# Patient Record
Sex: MALE | Race: WHITE | NOT HISPANIC OR LATINO | Employment: OTHER | URBAN - METROPOLITAN AREA
[De-identification: names, ages, dates, MRNs, and addresses within clinical notes are randomized per-mention and may not be internally consistent; named-entity substitution may affect disease eponyms.]

---

## 2017-02-09 ENCOUNTER — OFFICE VISIT (OUTPATIENT)
Dept: CARDIOLOGY | Facility: CLINIC | Age: 82
End: 2017-02-09

## 2017-02-09 VITALS
HEIGHT: 73 IN | DIASTOLIC BLOOD PRESSURE: 60 MMHG | SYSTOLIC BLOOD PRESSURE: 102 MMHG | BODY MASS INDEX: 21.74 KG/M2 | WEIGHT: 164 LBS | HEART RATE: 67 BPM

## 2017-02-09 DIAGNOSIS — I48.20 CHRONIC ATRIAL FIBRILLATION (HCC): Primary | ICD-10-CM

## 2017-02-09 DIAGNOSIS — I47.20 VENTRICULAR TACHYCARDIA (HCC): ICD-10-CM

## 2017-02-09 PROCEDURE — 99214 OFFICE O/P EST MOD 30 MIN: CPT | Performed by: INTERNAL MEDICINE

## 2017-02-09 PROCEDURE — 93000 ELECTROCARDIOGRAM COMPLETE: CPT | Performed by: INTERNAL MEDICINE

## 2017-02-09 RX ORDER — SEVELAMER CARBONATE 800 MG/1
800 TABLET, FILM COATED ORAL
COMMUNITY
End: 2018-12-24 | Stop reason: HOSPADM

## 2017-02-09 NOTE — PROGRESS NOTES
Date of Office Visit: 17  Encounter Provider: Niranjan Ponce MD  Place of Service: [facility] CARDIOLOGY  Patient Name: Shaun Brewster  :1930      Chief Complaint   Patient presents with   • Atrial Fibrillation     History of Present Illness  HPI Comments:  He is a very pleasant 86-year-old gentleman with history of PVCs, nonsustained ventricular  arrhythmias, and normal cardiac catheterization.  He then had paroxysmal atrial  fibrillation but could not tolerate amiodarone.  Ultimately he went into atrial  fibrillation chronically but is asymptomatic.  He comes in for followup.  He does get some occasional dizziness and near syncope or syncope denies palpitations.  He is obviously worse and more fatigued on the days he has dialysis. Denies orthopnea and PND denies any lower extremity edema.  He denies chest pain and pressure.  He denies any paralysis or paresthesias no bleeding.  His wife has been ill    Atrial Fibrillation   Symptoms are negative for dizziness and weakness. Past medical history includes atrial fibrillation.         Past Medical History   Diagnosis Date   • Anemia    • Aneurysm of aortic root    • Arthritis    • Atypical chest pain    • Blind right eye    • Chronic kidney disease (CKD), stage V    • Dizziness    • Dyslipidemia    • Esophageal reflux    • Esophagitis, reflux    • Femur fracture, right    • Hypertension    • Macular degeneration    • Malignant neoplasm of prostate      gland   • Nephrolithiasis    • Nonspecific abnormal finding    • Paroxysmal atrial fibrillation    • Postnasal drip    • Premature ventricular contractions    • Renal disease    • Throat pain    • Urge incontinence of urine    • Ventricular tachycardia          Past Surgical History   Procedure Laterality Date   • Other surgical history       cardiac cath procedure summary normal, corneal lasik   • Hip surgery     • Inguinal hernia repair     • Replacement total knee        left    • Knee surgery     • Shoulder surgery     • Av fistula placement Left 2015   • Tonsillectomy and adenoidectomy     • Hand surgery Bilateral          Current Outpatient Prescriptions on File Prior to Visit   Medication Sig Dispense Refill   • diltiazem (CARDIZEM) 120 MG tablet Take one qhs (Patient taking differently: 240 mg. Take one qhs) 90 tablet 1   • diltiazem CD (CARDIZEM CD) 240 MG 24 hr capsule Take 1 capsule by mouth daily. 90 capsule 3   • diltiazem XR (DILACOR XR) 120 MG 24 hr capsule Take 1 capsule by mouth every night. 90 capsule 3   • famotidine (PEPCID) 40 MG tablet Take 1 tablet by mouth daily. 90 tablet 3   • finasteride (PROSCAR) 5 MG tablet Take 1 tablet by mouth daily. 90 tablet 3   • Multiple Vitamins-Minerals (PRESERVISION AREDS) capsule Take 1 capsule by mouth 2 (two) times a day.     • sodium bicarbonate 650 MG tablet Take 1 tablet by mouth 2 (two) times a day.     • warfarin (COUMADIN) 2.5 MG tablet Take 2.5 mg by mouth every other day.     • warfarin (COUMADIN) 5 MG tablet Take one tablet daily or as directed 90 tablet 3     No current facility-administered medications on file prior to visit.          Social History     Social History   • Marital status:      Spouse name: N/A   • Number of children: N/A   • Years of education: N/A     Occupational History   • Not on file.     Social History Main Topics   • Smoking status: Former Smoker   • Smokeless tobacco: Not on file   • Alcohol use No   • Drug use: Not on file   • Sexual activity: Not on file     Other Topics Concern   • Not on file     Social History Narrative       Family History   Problem Relation Age of Onset   • Other Brother      acute bacterial endocarditis         Review of Systems   Constitution: Negative for decreased appetite, diaphoresis, fever, weakness, malaise/fatigue, weight gain and weight loss.   HENT: Positive for hearing loss. Negative for congestion, headaches, nosebleeds and tinnitus.    Eyes:  "Positive for vision loss in left eye and vision loss in right eye. Negative for blurred vision, double vision and visual disturbance.   Cardiovascular:        As noted in HPI   Respiratory:        As noted HPI   Endocrine: Negative for cold intolerance and heat intolerance.   Hematologic/Lymphatic: Negative for bleeding problem. Does not bruise/bleed easily.   Skin: Positive for itching. Negative for color change, flushing and rash.   Musculoskeletal: Negative for arthritis, back pain, joint pain, joint swelling, muscle weakness and myalgias.   Gastrointestinal: Negative for bloating, abdominal pain, constipation, diarrhea, dysphagia, heartburn, hematemesis, hematochezia, melena, nausea and vomiting.   Genitourinary: Negative for bladder incontinence, dysuria, frequency, nocturia and urgency.   Neurological: Negative for dizziness, focal weakness, light-headedness, loss of balance, numbness, paresthesias and vertigo.   Psychiatric/Behavioral: Negative for depression, memory loss and substance abuse.       Procedures      ECG 12 Lead  Date/Time: 2/9/2017 12:26 PM  Performed by: JAVID HARMAN  Authorized by: JAVID HARMAN   Comparison: compared with previous ECG   Similar to previous ECG  Comparison to previous ECG: In SR   Rhythm: sinus rhythm  Ectopy: unifocal PVCs  Rate: normal  Conduction: 1st degree  QRS axis: normal                 Objective:      Visit Vitals   • /60 (BP Location: Right arm)   • Pulse 67   • Ht 73\" (185.4 cm)   • Wt 164 lb (74.4 kg)   • BMI 21.64 kg/m2          Physical Exam  Physical Exam   Constitutional: He is oriented to person, place, and time. He appears well-developed and well-nourished. No distress.   HENT:   Head: Normocephalic.   Eyes: Conjunctivae are normal. Pupils are equal, round, and reactive to light. No scleral icterus.   Neck: Normal carotid pulses, no hepatojugular reflux and no JVD present. Carotid bruit is not present. No tracheal deviation, no edema and no " erythema present. No thyromegaly present.   Cardiovascular: Normal rate, regular rhythm, S1 normal, S2 normal, normal heart sounds and intact distal pulses.   Extrasystoles are present. PMI is not displaced.  Exam reveals no gallop, no distant heart sounds and no friction rub.    No murmur heard.  Pulses:       Carotid pulses are 2+ on the right side, and 2+ on the left side.       Radial pulses are 2+ on the right side, and 2+ on the left side.        Femoral pulses are 2+ on the right side, and 2+ on the left side.       Dorsalis pedis pulses are 2+ on the right side, and 2+ on the left side.        Posterior tibial pulses are 2+ on the right side, and 2+ on the left side.   Fistula left forearm   Pulmonary/Chest: Effort normal and breath sounds normal. No respiratory distress. He has no decreased breath sounds. He has no wheezes. He has no rhonchi. He has no rales. He exhibits no tenderness.   Abdominal: Soft. Bowel sounds are normal. He exhibits no distension and no mass. There is no hepatosplenomegaly. There is no tenderness. There is no rebound and no guarding.   Musculoskeletal: He exhibits no edema, tenderness or deformity.   Neurological: He is alert and oriented to person, place, and time.   Skin: Skin is warm and dry. No rash noted. He is not diaphoretic. No cyanosis or erythema. No pallor. Nails show no clubbing.   Psychiatric: He has a normal mood and affect. His speech is normal and behavior is normal. Judgment and thought content normal.           Assessment:   1. This is an 86-year-old with a history of PVCs, nonsustained ventricular tachycardia, normal LV function, and normal cardiac catheterization. Continue the same. He will see us  in follow up in six months.  2. Chronic Atrial fibrillation, in atrial fibrillation. He could not tolerate amiodarone.   Atrial Fibrillation and Atrial Flutter  Assessment  • The patient has paroxysmal atrial fibrillation  • This is non-valvular in etiology  • The  patient's CHADS2-VASc score is 3  • A [id number] score of 2 or more is considered a high risk for a thromboembolic event  • Warfarin prescribed    Plan  • Continue in atrial fibrillation with rate control  • Continue warfarin for antithrombotic therapy, bleeding issues discussed  • Continue diltiazem for rate control  Interestingly today he's in sinus rhythm he's to continue the same will see us in follow-up in 6 months.    3. Dilated aortic root. Would be conservative and not really a candidate for surgical repair.  4. Renal Failure. Now on dialysis.          Plan:

## 2017-03-28 ENCOUNTER — TELEPHONE (OUTPATIENT)
Dept: CARDIOLOGY | Facility: CLINIC | Age: 82
End: 2017-03-28

## 2017-03-28 NOTE — TELEPHONE ENCOUNTER
Patient's insurance won't cover Diltiazem HCL ER 24 HR.  Will you consider changing to a formulary medication?  I have placed the list in your inbox to choose from.  Thank you/ ELLIOTT    Patient's phone number is [phone]

## 2017-03-29 RX ORDER — DILTIAZEM HYDROCHLORIDE 240 MG/1
240 CAPSULE, COATED, EXTENDED RELEASE ORAL DAILY
Qty: 90 CAPSULE | Refills: 3 | Status: SHIPPED | OUTPATIENT
Start: 2017-03-29 | End: 2018-04-26 | Stop reason: ALTCHOICE

## 2017-04-08 ENCOUNTER — DOCUMENTATION (OUTPATIENT)
Dept: PHYSICAL THERAPY | Facility: CLINIC | Age: 82
End: 2017-04-08

## 2017-04-08 RX ORDER — FAMOTIDINE 40 MG/1
40 TABLET, FILM COATED ORAL DAILY
Qty: 90 TABLET | Refills: 3 | Status: SHIPPED | OUTPATIENT
Start: 2017-04-08 | End: 2018-03-18 | Stop reason: SDUPTHER

## 2017-04-28 ENCOUNTER — DOCUMENTATION (OUTPATIENT)
Dept: PHYSICAL THERAPY | Facility: CLINIC | Age: 82
End: 2017-04-28

## 2017-04-28 RX ORDER — FINASTERIDE 5 MG/1
5 TABLET, FILM COATED ORAL DAILY
Qty: 90 TABLET | Refills: 3 | Status: SHIPPED | OUTPATIENT
Start: 2017-04-28 | End: 2018-03-18 | Stop reason: SDUPTHER

## 2017-09-07 ENCOUNTER — OFFICE VISIT (OUTPATIENT)
Dept: CARDIOLOGY | Facility: CLINIC | Age: 82
End: 2017-09-07

## 2017-09-07 DIAGNOSIS — N18.5 RENAL FAILURE, CHRONIC, STAGE 5 (HCC): ICD-10-CM

## 2017-09-07 DIAGNOSIS — I77.810 AORTIC ROOT DILATATION (HCC): ICD-10-CM

## 2017-09-07 DIAGNOSIS — I47.20 VENTRICULAR TACHYCARDIA (HCC): ICD-10-CM

## 2017-09-07 DIAGNOSIS — I48.20 CHRONIC ATRIAL FIBRILLATION (HCC): Primary | ICD-10-CM

## 2017-09-07 PROCEDURE — 93000 ELECTROCARDIOGRAM COMPLETE: CPT | Performed by: INTERNAL MEDICINE

## 2017-09-07 PROCEDURE — 99214 OFFICE O/P EST MOD 30 MIN: CPT | Performed by: INTERNAL MEDICINE

## 2017-09-07 RX ORDER — FINASTERIDE 5 MG/1
TABLET, FILM COATED ORAL
COMMUNITY
Start: 2017-08-05 | End: 2018-12-19 | Stop reason: HOSPADM

## 2017-09-07 RX ORDER — B COMPLEX, C NO.20/FOLIC ACID 1 MG
1 CAPSULE ORAL
COMMUNITY
End: 2019-04-25

## 2017-09-07 NOTE — PROGRESS NOTES
Date of Office Visit: 17  Encounter Provider: Niranjan Ponce MD  Place of Service: [facility] CARDIOLOGY  Patient Name: Shaun Brewster  :1930      Chief Complaint   Patient presents with   • Atrial Fibrillation     History of Present Illness  HPI Comments:  He is a very pleasant 87-year-old gentleman with history of PVCs, nonsustained ventricular  arrhythmias, and normal cardiac catheterization.  He then had paroxysmal atrial  fibrillation but could not tolerate amiodarone.  Ultimately he went into atrial  fibrillation chronically but is asymptomatic.  He comes in for followup. The patient denies chest pain, pressure and heaviness. No shortness of breath, othopnea or PND. No palpitations, near syncope or syncope. No stroke type symptoms like paralysis, paresthesia, abrupt vision loss and dysarthria. No bleeding like blood in the stool or dark stools.   He does have some fatigue especially after dialysis.  He also has occasional constipation.  He does occasionally walk with a walker or cane.  Overall he feels like he's doing well and things at home are good.  His INR is monitored by his PCP    Atrial Fibrillation   Symptoms are negative for dizziness and weakness. Past medical history includes atrial fibrillation.         Past Medical History:   Diagnosis Date   • Anemia    • Aneurysm of aortic root    • Arthritis    • Atypical chest pain    • Blind right eye    • Chronic kidney disease (CKD), stage V    • Dizziness    • Dyslipidemia    • Esophageal reflux    • Esophagitis, reflux    • Femur fracture, right    • Hypertension    • Macular degeneration    • Malignant neoplasm of prostate     gland   • Nephrolithiasis    • Nonspecific abnormal finding    • Paroxysmal atrial fibrillation    • Postnasal drip    • Premature ventricular contractions    • Renal disease    • Throat pain    • Urge incontinence of urine    • Ventricular tachycardia          Past Surgical History:    Procedure Laterality Date   • ARTERIOVENOUS FISTULA Left 2015   • HAND SURGERY Bilateral    • HIP SURGERY     • INGUINAL HERNIA REPAIR     • KNEE SURGERY     • OTHER SURGICAL HISTORY      cardiac cath procedure summary normal, corneal lasik   • REPLACEMENT TOTAL KNEE      left    • SHOULDER SURGERY     • TONSILLECTOMY AND ADENOIDECTOMY           Current Outpatient Prescriptions on File Prior to Visit   Medication Sig Dispense Refill   • diltiaZEM CD (CARDIZEM CD) 240 MG 24 hr capsule Take 1 capsule by mouth Daily. 90 capsule 3   • famotidine (PEPCID) 40 MG tablet Take 1 tablet by mouth Daily. 1 qd 90 tablet 3   • Multiple Vitamins-Minerals (PRESERVISION AREDS) capsule Take 1 capsule by mouth 2 (two) times a day.     • sevelamer (RENVELA) 800 MG tablet Take 800 mg by mouth 3 (Three) Times a Day With Meals.     • sodium bicarbonate 650 MG tablet Take 1 tablet by mouth 2 (two) times a day.     • warfarin (COUMADIN) 2.5 MG tablet Take 2.5 mg by mouth every other day.     • warfarin (COUMADIN) 5 MG tablet Take one tablet daily or as directed 90 tablet 3     No current facility-administered medications on file prior to visit.          Social History     Social History   • Marital status:      Spouse name: N/A   • Number of children: N/A   • Years of education: N/A     Occupational History   • Not on file.     Social History Main Topics   • Smoking status: Former Smoker   • Smokeless tobacco: Not on file   • Alcohol use No   • Drug use: Not on file   • Sexual activity: Not on file     Other Topics Concern   • Not on file     Social History Narrative       Family History   Problem Relation Age of Onset   • Other Brother      acute bacterial endocarditis         Review of Systems   Constitution: Negative for decreased appetite, diaphoresis, fever, weakness, malaise/fatigue, weight gain and weight loss.   HENT: Negative for congestion, headaches, hearing loss, nosebleeds and tinnitus.    Eyes: Negative for blurred  vision, double vision, vision loss in left eye, vision loss in right eye and visual disturbance.   Cardiovascular:        As noted in HPI   Respiratory:        As noted HPI   Endocrine: Negative for cold intolerance and heat intolerance.   Hematologic/Lymphatic: Negative for bleeding problem. Does not bruise/bleed easily.   Skin: Negative for color change, flushing, itching and rash.   Musculoskeletal: Negative for arthritis, back pain, joint pain, joint swelling, muscle weakness and myalgias.   Gastrointestinal: Negative for bloating, abdominal pain, constipation, diarrhea, dysphagia, heartburn, hematemesis, hematochezia, melena, nausea and vomiting.   Genitourinary: Negative for bladder incontinence, dysuria, frequency, nocturia and urgency.   Neurological: Negative for dizziness, focal weakness, light-headedness, loss of balance, numbness, paresthesias and vertigo.   Psychiatric/Behavioral: Negative for depression, memory loss and substance abuse.       Procedures      ECG 12 Lead  Date/Time: 9/7/2017 2:28 PM  Performed by: JAVID HARMAN  Authorized by: JAVID HARMAN   Comparison: compared with previous ECG   Similar to previous ECG  Rhythm: atrial fibrillation  Rate: normal  QRS axis: normal  Clinical impression comment: Diffuse ST _ T abnormality                 Objective:    There were no vitals taken for this visit.       Physical Exam  Physical Exam   Constitutional: He is oriented to person, place, and time. He appears well-developed and well-nourished. No distress.   HENT:   Head: Normocephalic.   Eyes: Conjunctivae are normal. Pupils are equal, round, and reactive to light. No scleral icterus.   Neck: Normal carotid pulses, no hepatojugular reflux and no JVD present. Carotid bruit is not present. No tracheal deviation, no edema and no erythema present. No thyromegaly present.   Cardiovascular: Normal rate, S1 normal, S2 normal, normal heart sounds and intact distal pulses.  An irregularly  irregular rhythm present.  No extrasystoles are present. PMI is not displaced.  Exam reveals no gallop, no distant heart sounds and no friction rub.    No murmur heard.  Pulses:       Carotid pulses are 2+ on the right side, and 2+ on the left side.       Radial pulses are 2+ on the right side, and 2+ on the left side.        Femoral pulses are 2+ on the right side, and 2+ on the left side.       Dorsalis pedis pulses are 2+ on the right side, and 2+ on the left side.        Posterior tibial pulses are 2+ on the right side, and 2+ on the left side.   Fistula left upper arm.   Pulmonary/Chest: Effort normal and breath sounds normal. No respiratory distress. He has no decreased breath sounds. He has no wheezes. He has no rhonchi. He has no rales. He exhibits no tenderness.   Abdominal: Soft. Bowel sounds are normal. He exhibits no distension and no mass. There is no hepatosplenomegaly. There is no tenderness. There is no rebound and no guarding.   Musculoskeletal: He exhibits no edema, tenderness or deformity.   Neurological: He is alert and oriented to person, place, and time.   Skin: Skin is warm and dry. No rash noted. He is not diaphoretic. No cyanosis or erythema. No pallor. Nails show no clubbing.   Psychiatric: He has a normal mood and affect. His speech is normal and behavior is normal. Judgment and thought content normal.           Assessment:   1. This is an 87-year-old with a history of PVCs, nonsustained ventricular tachycardia, normal LV function, and normal cardiac catheterization. Continue the same. He will see us  in follow up in six months.  2. Chronic Atrial fibrillation, in atrial fibrillation. He could not tolerate amiodarone.   Atrial Fibrillation and Atrial Flutter  Assessment  • The patient has paroxysmal atrial fibrillation  • This is non-valvular in etiology  • The patient's CHADS2-VASc score is 3  • A [id number] score of 2 or more is considered a high risk for a thromboembolic event  •  Warfarin prescribed    Plan  • Continue in atrial fibrillation with rate control  • Continue warfarin for antithrombotic therapy, bleeding issues discussed  • Continue diltiazem for rate control  And age fibrillation today continue the same see us in follow-up in 6 months     3. Dilated aortic root. Would be conservative and not really a candidate for surgical repair.  4. Renal Failure. Now on dialysis.           Plan:

## 2018-01-02 RX ORDER — DILTIAZEM HYDROCHLORIDE 120 MG/1
120 CAPSULE, EXTENDED RELEASE ORAL NIGHTLY
Qty: 90 CAPSULE | Refills: 3 | Status: SHIPPED | OUTPATIENT
Start: 2018-01-02 | End: 2018-12-02 | Stop reason: SDUPTHER

## 2018-01-02 RX ORDER — WARFARIN SODIUM 5 MG/1
TABLET ORAL
Qty: 90 TABLET | Refills: 3 | Status: SHIPPED | OUTPATIENT
Start: 2018-01-02 | End: 2018-12-02 | Stop reason: SDUPTHER

## 2018-03-19 RX ORDER — FAMOTIDINE 40 MG/1
TABLET, FILM COATED ORAL
Qty: 90 TABLET | Refills: 3 | Status: SHIPPED | OUTPATIENT
Start: 2018-03-19 | End: 2019-03-08 | Stop reason: SDUPTHER

## 2018-03-19 RX ORDER — FINASTERIDE 5 MG/1
TABLET, FILM COATED ORAL
Qty: 90 TABLET | Refills: 3 | Status: SHIPPED | OUTPATIENT
Start: 2018-03-19 | End: 2018-04-26 | Stop reason: SDUPTHER

## 2018-04-26 ENCOUNTER — OFFICE VISIT (OUTPATIENT)
Dept: CARDIOLOGY | Facility: CLINIC | Age: 83
End: 2018-04-26

## 2018-04-26 VITALS
HEIGHT: 73 IN | SYSTOLIC BLOOD PRESSURE: 104 MMHG | WEIGHT: 172 LBS | HEART RATE: 73 BPM | DIASTOLIC BLOOD PRESSURE: 64 MMHG | BODY MASS INDEX: 22.8 KG/M2

## 2018-04-26 DIAGNOSIS — I47.20 VENTRICULAR TACHYCARDIA (HCC): ICD-10-CM

## 2018-04-26 DIAGNOSIS — I77.810 AORTIC ROOT DILATATION (HCC): ICD-10-CM

## 2018-04-26 DIAGNOSIS — N18.5 RENAL FAILURE, CHRONIC, STAGE 5 (HCC): ICD-10-CM

## 2018-04-26 DIAGNOSIS — I48.20 CHRONIC ATRIAL FIBRILLATION (HCC): Primary | ICD-10-CM

## 2018-04-26 PROCEDURE — 99214 OFFICE O/P EST MOD 30 MIN: CPT | Performed by: INTERNAL MEDICINE

## 2018-04-26 NOTE — PROGRESS NOTES
Date of Office Visit: 18  Encounter Provider: Niranjan Ponce MD  Place of Service: [facility] CARDIOLOGY  Patient Name: Shaun Brewster  :1930  Referral Provider:Andrew Trujillo*      No chief complaint on file.    History of Present Illness   He is a very pleasant 87-year-old gentleman with history of PVCs, nonsustained ventricular arrhythmias, and normal cardiac catheterization.  He then had paroxysmal atrial fibrillation but could not tolerate amiodarone.  Ultimately he went into atrial fibrillation chronically but is asymptomatic.  He comes in for followup.  The patient denies chest pain, pressure and heaviness. No shortness of breath, othopnea or PND. No palpitations, near syncope or syncope. No stroke type symptoms like paralysis, paresthesia, abrupt vision loss and dysarthria. No bleeding like blood in the stool or dark stools.   He's been getting along great.  Walks with a cane they moved to a smaller unit which is helped him some.  He's had no falling spells.      Atrial Fibrillation   Symptoms are negative for dizziness and weakness. Past medical history includes atrial fibrillation.         Past Medical History:   Diagnosis Date   • Anemia    • Aneurysm of aortic root    • Arthritis    • Atypical chest pain    • Blind right eye    • Chronic kidney disease (CKD), stage V    • Dizziness    • Dyslipidemia    • Esophageal reflux    • Esophagitis, reflux    • Femur fracture, right    • Hypertension    • Macular degeneration    • Malignant neoplasm of prostate     gland   • Nephrolithiasis    • Nonspecific abnormal finding    • Paroxysmal atrial fibrillation    • Postnasal drip    • Premature ventricular contractions    • Renal disease    • Throat pain    • Urge incontinence of urine    • Ventricular tachycardia          Past Surgical History:   Procedure Laterality Date   • ARTERIOVENOUS FISTULA Left 2015   • HAND SURGERY Bilateral    • HIP SURGERY     •  INGUINAL HERNIA REPAIR     • KNEE SURGERY     • OTHER SURGICAL HISTORY      cardiac cath procedure summary normal, corneal lasik   • REPLACEMENT TOTAL KNEE      left    • SHOULDER SURGERY     • TONSILLECTOMY AND ADENOIDECTOMY           Current Outpatient Prescriptions on File Prior to Visit   Medication Sig Dispense Refill   • diltiaZEM CD (CARDIZEM CD) 240 MG 24 hr capsule Take 1 capsule by mouth Daily. 90 capsule 3   • diltiazem XR (DILACOR XR) 120 MG 24 hr capsule Take 1 capsule by mouth Every Night. 90 capsule 3   • famotidine (PEPCID) 40 MG tablet TAKE 1 TABLET DAILY 90 tablet 3   • finasteride (PROSCAR) 5 MG tablet      • finasteride (PROSCAR) 5 MG tablet TAKE 1 TABLET DAILY 90 tablet 3   • Multiple Vitamins-Minerals (PRESERVISION AREDS) capsule Take 1 capsule by mouth 2 (two) times a day.     • sevelamer (RENVELA) 800 MG tablet Take 800 mg by mouth 3 (Three) Times a Day With Meals.     • sodium bicarbonate 650 MG tablet Take 1 tablet by mouth 2 (two) times a day.     • triphrocaps (NEPHROCAPS) 1 MG capsule capsule Take 1 capsule by mouth.     • warfarin (COUMADIN) 2.5 MG tablet Take 2.5 mg by mouth every other day.     • warfarin (COUMADIN) 5 MG tablet Take one tablet daily or as directed 90 tablet 3     No current facility-administered medications on file prior to visit.          Social History     Social History   • Marital status:      Spouse name: N/A   • Number of children: N/A   • Years of education: N/A     Occupational History   • Not on file.     Social History Main Topics   • Smoking status: Former Smoker   • Smokeless tobacco: Not on file   • Alcohol use No   • Drug use: Unknown   • Sexual activity: Not on file     Other Topics Concern   • Not on file     Social History Narrative   • No narrative on file       Family History   Problem Relation Age of Onset   • Other Brother      acute bacterial endocarditis         Review of Systems   Constitution: Negative for decreased appetite,  diaphoresis, fever, weakness, malaise/fatigue, weight gain and weight loss.   HENT: Negative for congestion, hearing loss, nosebleeds and tinnitus.    Eyes: Negative for blurred vision, double vision, vision loss in left eye, vision loss in right eye and visual disturbance.   Cardiovascular:        As noted in HPI   Respiratory:        As noted HPI   Endocrine: Negative for cold intolerance and heat intolerance.   Hematologic/Lymphatic: Negative for bleeding problem. Does not bruise/bleed easily.   Skin: Negative for color change, flushing, itching and rash.   Musculoskeletal: Negative for arthritis, back pain, joint pain, joint swelling, muscle weakness and myalgias.   Gastrointestinal: Negative for bloating, abdominal pain, constipation, diarrhea, dysphagia, heartburn, hematemesis, hematochezia, melena, nausea and vomiting.   Genitourinary: Negative for bladder incontinence, dysuria, frequency, nocturia and urgency.   Neurological: Negative for dizziness, focal weakness, headaches, light-headedness, loss of balance, numbness, paresthesias and vertigo.   Psychiatric/Behavioral: Negative for depression, memory loss and substance abuse.       Procedures    Procedures        Objective:    There were no vitals taken for this visit.       Physical Exam  Physical Exam   Constitutional: He is oriented to person, place, and time. He appears well-developed and well-nourished. No distress.   HENT:   Head: Normocephalic.   Eyes: Conjunctivae are normal. Pupils are equal, round, and reactive to light. No scleral icterus.   Neck: Normal carotid pulses, no hepatojugular reflux and no JVD present. Carotid bruit is not present. No tracheal deviation, no edema and no erythema present. No thyromegaly present.   Cardiovascular: Normal rate, S1 normal, S2 normal, normal heart sounds and intact distal pulses.  An irregularly irregular rhythm present.  No extrasystoles are present. PMI is not displaced.  Exam reveals no gallop, no  distant heart sounds and no friction rub.    No murmur heard.  Pulses:       Carotid pulses are 2+ on the right side, and 2+ on the left side.       Radial pulses are 2+ on the right side, and 2+ on the left side.        Femoral pulses are 2+ on the right side, and 2+ on the left side.       Dorsalis pedis pulses are 2+ on the right side, and 2+ on the left side.        Posterior tibial pulses are 2+ on the right side, and 2+ on the left side.   Fistula left upper arm.   Pulmonary/Chest: Effort normal and breath sounds normal. No respiratory distress. He has no decreased breath sounds. He has no wheezes. He has no rhonchi. He has no rales. He exhibits no tenderness.   Abdominal: Soft. Bowel sounds are normal. He exhibits no distension and no mass. There is no hepatosplenomegaly. There is no tenderness. There is no rebound and no guarding.   Musculoskeletal: He exhibits no edema, tenderness or deformity.   Neurological: He is alert and oriented to person, place, and time.   Skin: Skin is warm and dry. No rash noted. He is not diaphoretic. No cyanosis or erythema. No pallor. Nails show no clubbing.   Psychiatric: He has a normal mood and affect. His speech is normal and behavior is normal. Judgment and thought content normal.           Assessment:   1. This is an 87-year-old with a history of PVCs, nonsustained ventricular tachycardia, normal LV function, and normal cardiac catheterization. Continue the same. He will see us  in follow up in six months.  2. Chronic Atrial fibrillation, in atrial fibrillation. He could not tolerate amiodarone.   Atrial Fibrillation and Atrial Flutter  Assessment  • The patient has paroxysmal atrial fibrillation  • This is non-valvular in etiology  • The patient's CHADS2-VASc score is 3  • A [id number]-[id number] score of 2 or more is considered a high risk for a thromboembolic event  • Warfarin prescribed    Plan  • Continue in atrial fibrillation with rate control  • Continue warfarin for  antithrombotic therapy, bleeding issues discussed  • Continue diltiazem for rate control  He is very stable on some low-dose diltiazem and anticoagulation.  He'll see us in follow-up in 6 months.     3. Dilated aortic root. Would be conservative and not really a candidate for surgical repair.  4. Renal Failure. Now on dialysis.            Plan:

## 2018-07-16 DIAGNOSIS — L98.9 SKIN LESION OF FACE: Primary | ICD-10-CM

## 2018-07-23 ENCOUNTER — TELEPHONE (OUTPATIENT)
Dept: SPORTS MEDICINE | Facility: CLINIC | Age: 83
End: 2018-07-23

## 2018-07-23 NOTE — TELEPHONE ENCOUNTER
Samia from [facility] called to inform that patient's INR was 1.5 on 7/22/2018. Results were faxed to our office.

## 2018-08-06 ENCOUNTER — TELEPHONE (OUTPATIENT)
Dept: SPORTS MEDICINE | Facility: CLINIC | Age: 83
End: 2018-08-06

## 2018-09-18 ENCOUNTER — TELEPHONE (OUTPATIENT)
Dept: SPORTS MEDICINE | Facility: CLINIC | Age: 83
End: 2018-09-18

## 2018-10-02 ENCOUNTER — TELEPHONE (OUTPATIENT)
Dept: SPORTS MEDICINE | Facility: CLINIC | Age: 83
End: 2018-10-02

## 2018-10-02 NOTE — TELEPHONE ENCOUNTER
Mahnaz hunt [facility] called stating patient checked INR on 9/30/2018    INR 1.6      Results have been faxed to our office.

## 2018-11-20 ENCOUNTER — OFFICE VISIT (OUTPATIENT)
Dept: SPORTS MEDICINE | Facility: CLINIC | Age: 83
End: 2018-11-20

## 2018-11-20 VITALS
WEIGHT: 168 LBS | OXYGEN SATURATION: 98 % | HEART RATE: 70 BPM | SYSTOLIC BLOOD PRESSURE: 102 MMHG | TEMPERATURE: 98 F | BODY MASS INDEX: 22.26 KG/M2 | HEIGHT: 73 IN | DIASTOLIC BLOOD PRESSURE: 64 MMHG

## 2018-11-20 DIAGNOSIS — H61.23 BILATERAL IMPACTED CERUMEN: ICD-10-CM

## 2018-11-20 DIAGNOSIS — S60.459A ACUTE FOREIGN BODY OF INDEX FINGER, INITIAL ENCOUNTER: Primary | ICD-10-CM

## 2018-11-20 PROCEDURE — 69209 REMOVE IMPACTED EAR WAX UNI: CPT | Performed by: FAMILY MEDICINE

## 2018-11-20 PROCEDURE — 99214 OFFICE O/P EST MOD 30 MIN: CPT | Performed by: FAMILY MEDICINE

## 2018-11-20 PROCEDURE — 73140 X-RAY EXAM OF FINGER(S): CPT | Performed by: FAMILY MEDICINE

## 2018-11-20 NOTE — PROGRESS NOTES
"Shaun is a 88 y.o. year old male    Chief complaint: \"I think my ears are stopped up\" also left index finger discomfort possible foreign body    History of Present Illness   HPI   1.  Patient with a past history of cerumen impaction and requires flushing 1-2 times per year.  He has noticed some decreased hearing on the left side.  No discharge.  No pain.  2.  About 2 months ago patient was changing a roll of toilet paper for a metal spindle and feels like he may have gotten a sliver of metal stuck into the tip of his left index finger.  He has noted some sharp discomfort and \"electrical shock\" sensation on occasion but no swelling or erythema, there was no bleeding and there is no discharge.    I have reviewed the patient's medical, family, and social history in detail and updated the computerized patient record.    Review of Systems   Constitutional: Negative.    HENT: Positive for hearing loss. Negative for ear discharge and ear pain.    Respiratory: Negative.    Cardiovascular: Negative.    Musculoskeletal:        Per history of present illness       /64   Pulse 70   Temp 98 °F (36.7 °C)   Ht 185.4 cm (73\")   Wt 76.2 kg (168 lb)   SpO2 98%   BMI 22.16 kg/m²      Physical Exam   Constitutional: He is oriented to person, place, and time. He appears well-developed and well-nourished.   HENT:   Head: Normocephalic and atraumatic.   Bilateral cerumen impaction left greater than right.  I personally flushed ears with warm water with return of cerumen.  Patient tolerated well no adverse effects.   Eyes: Conjunctivae and EOM are normal. Pupils are equal, round, and reactive to light.   Cardiovascular:   No peripheral edema   Pulmonary/Chest: Effort normal.   Musculoskeletal:   Left index finger is normal in general appearance.  Motor 5 out of 5.  Neurovascularly intact.   Neurological: He is alert and oriented to person, place, and time.   Skin: Skin is warm and dry.   Psychiatric: He has a normal mood and " affect. His behavior is normal.   Vitals reviewed.   x-ray left index finger:  No foreign body is evident.      Diagnoses and all orders for this visit:    Acute foreign body of index finger, initial encounter  -     XR Finger 2+ View Left    Bilateral impacted cerumen         The left index finger sounds like it could be a small neuroma in the distal tip.  We'll observe for now.    EMR Dragon/Transcription disclaimer:    Much of this encounter note is an electronic transcription/translation of spoken language to printed text.  The electronic translation of spoken language may permit erroneous, or at times, nonsensical words or phrases to be inadvertently transcribed.  Although I have reviewed the note for such errors some may still exist.

## 2018-12-03 RX ORDER — WARFARIN SODIUM 5 MG/1
TABLET ORAL
Qty: 90 TABLET | Refills: 3 | Status: SHIPPED | OUTPATIENT
Start: 2018-12-03 | End: 2018-12-24 | Stop reason: HOSPADM

## 2018-12-03 RX ORDER — DILTIAZEM HYDROCHLORIDE 120 MG/1
CAPSULE, EXTENDED RELEASE ORAL
Qty: 90 CAPSULE | Refills: 3 | Status: SHIPPED | OUTPATIENT
Start: 2018-12-03 | End: 2018-12-24 | Stop reason: HOSPADM

## 2018-12-06 ENCOUNTER — APPOINTMENT (OUTPATIENT)
Dept: GENERAL RADIOLOGY | Facility: HOSPITAL | Age: 83
End: 2018-12-06

## 2018-12-06 ENCOUNTER — HOSPITAL ENCOUNTER (EMERGENCY)
Facility: HOSPITAL | Age: 83
Discharge: HOME | End: 2018-12-06
Attending: EMERGENCY MEDICINE | Admitting: EMERGENCY MEDICINE

## 2018-12-06 VITALS
SYSTOLIC BLOOD PRESSURE: 137 MMHG | DIASTOLIC BLOOD PRESSURE: 86 MMHG | BODY MASS INDEX: 22.2 KG/M2 | HEIGHT: 74 IN | RESPIRATION RATE: 17 BRPM | WEIGHT: 173 LBS | TEMPERATURE: 97.9 F | HEART RATE: 95 BPM | OXYGEN SATURATION: 98 %

## 2018-12-06 DIAGNOSIS — S22.41XA CLOSED FRACTURE OF MULTIPLE RIBS OF RIGHT SIDE, INITIAL ENCOUNTER: Primary | ICD-10-CM

## 2018-12-06 DIAGNOSIS — N18.6 END STAGE RENAL DISEASE (HCC): ICD-10-CM

## 2018-12-06 LAB
ALBUMIN SERPL-MCNC: 4.3 G/DL (ref 3.5–5.2)
ALBUMIN/GLOB SERPL: 1.7 G/DL
ALP SERPL-CCNC: 70 U/L (ref 39–117)
ALT SERPL W P-5'-P-CCNC: 20 U/L (ref 1–41)
ANION GAP SERPL CALCULATED.3IONS-SCNC: 16.2 MMOL/L
AST SERPL-CCNC: 20 U/L (ref 1–40)
BACTERIA UR QL AUTO: NORMAL /HPF
BASOPHILS # BLD AUTO: 0.03 10*3/MM3 (ref 0–0.2)
BASOPHILS NFR BLD AUTO: 0.3 % (ref 0–1.5)
BILIRUB SERPL-MCNC: 0.4 MG/DL (ref 0.1–1.2)
BILIRUB UR QL STRIP: NEGATIVE
BUN BLD-MCNC: 45 MG/DL (ref 8–23)
BUN/CREAT SERPL: 8.1 (ref 7–25)
CALCIUM SPEC-SCNC: 10.4 MG/DL (ref 8.6–10.5)
CHLORIDE SERPL-SCNC: 97 MMOL/L (ref 98–107)
CLARITY UR: CLEAR
CO2 SERPL-SCNC: 23.8 MMOL/L (ref 22–29)
COLOR UR: YELLOW
CREAT BLD-MCNC: 5.59 MG/DL (ref 0.76–1.27)
DEPRECATED RDW RBC AUTO: 49.5 FL (ref 37–54)
EOSINOPHIL # BLD AUTO: 0.1 10*3/MM3 (ref 0–0.7)
EOSINOPHIL NFR BLD AUTO: 0.9 % (ref 0.3–6.2)
ERYTHROCYTE [DISTWIDTH] IN BLOOD BY AUTOMATED COUNT: 14.5 % (ref 11.5–14.5)
GFR SERPL CREATININE-BSD FRML MDRD: 10 ML/MIN/1.73
GFR SERPL CREATININE-BSD FRML MDRD: ABNORMAL ML/MIN/1.73
GLOBULIN UR ELPH-MCNC: 2.6 GM/DL
GLUCOSE BLD-MCNC: 124 MG/DL (ref 65–99)
GLUCOSE UR STRIP-MCNC: ABNORMAL MG/DL
HCT VFR BLD AUTO: 30.8 % (ref 40.4–52.2)
HGB BLD-MCNC: 10.2 G/DL (ref 13.7–17.6)
HGB UR QL STRIP.AUTO: NEGATIVE
HYALINE CASTS UR QL AUTO: NORMAL /LPF
IMM GRANULOCYTES # BLD: 0.06 10*3/MM3 (ref 0–0.03)
IMM GRANULOCYTES NFR BLD: 0.5 % (ref 0–0.5)
INR PPP: 2.18 (ref 0.9–1.1)
KETONES UR QL STRIP: NEGATIVE
LEUKOCYTE ESTERASE UR QL STRIP.AUTO: NEGATIVE
LYMPHOCYTES # BLD AUTO: 0.73 10*3/MM3 (ref 0.9–4.8)
LYMPHOCYTES NFR BLD AUTO: 6.7 % (ref 19.6–45.3)
MCH RBC QN AUTO: 31.1 PG (ref 27–32.7)
MCHC RBC AUTO-ENTMCNC: 33.1 G/DL (ref 32.6–36.4)
MCV RBC AUTO: 93.9 FL (ref 79.8–96.2)
MONOCYTES # BLD AUTO: 0.79 10*3/MM3 (ref 0.2–1.2)
MONOCYTES NFR BLD AUTO: 7.2 % (ref 5–12)
NEUTROPHILS # BLD AUTO: 9.26 10*3/MM3 (ref 1.9–8.1)
NEUTROPHILS NFR BLD AUTO: 84.9 % (ref 42.7–76)
NITRITE UR QL STRIP: NEGATIVE
PH UR STRIP.AUTO: >=9 [PH] (ref 5–8)
PLATELET # BLD AUTO: 184 10*3/MM3 (ref 140–500)
PMV BLD AUTO: 10.1 FL (ref 6–12)
POTASSIUM BLD-SCNC: 4.3 MMOL/L (ref 3.5–5.2)
PROT SERPL-MCNC: 6.9 G/DL (ref 6–8.5)
PROT UR QL STRIP: ABNORMAL
PROTHROMBIN TIME: 23.9 SECONDS (ref 11.7–14.2)
RBC # BLD AUTO: 3.28 10*6/MM3 (ref 4.6–6)
RBC # UR: NORMAL /HPF
REF LAB TEST METHOD: NORMAL
SODIUM BLD-SCNC: 137 MMOL/L (ref 136–145)
SP GR UR STRIP: 1.01 (ref 1–1.03)
SQUAMOUS #/AREA URNS HPF: NORMAL /HPF
UROBILINOGEN UR QL STRIP: ABNORMAL
WBC NRBC COR # BLD: 10.91 10*3/MM3 (ref 4.5–10.7)
WBC UR QL AUTO: NORMAL /HPF

## 2018-12-06 PROCEDURE — 85610 PROTHROMBIN TIME: CPT | Performed by: EMERGENCY MEDICINE

## 2018-12-06 PROCEDURE — 85025 COMPLETE CBC W/AUTO DIFF WBC: CPT | Performed by: EMERGENCY MEDICINE

## 2018-12-06 PROCEDURE — 94799 UNLISTED PULMONARY SVC/PX: CPT

## 2018-12-06 PROCEDURE — 71101 X-RAY EXAM UNILAT RIBS/CHEST: CPT

## 2018-12-06 PROCEDURE — 25010000002 ONDANSETRON PER 1 MG: Performed by: EMERGENCY MEDICINE

## 2018-12-06 PROCEDURE — 80053 COMPREHEN METABOLIC PANEL: CPT | Performed by: EMERGENCY MEDICINE

## 2018-12-06 PROCEDURE — 25010000002 MORPHINE (PF) 10 MG/ML SOLUTION: Performed by: EMERGENCY MEDICINE

## 2018-12-06 PROCEDURE — 96374 THER/PROPH/DIAG INJ IV PUSH: CPT

## 2018-12-06 PROCEDURE — 96375 TX/PRO/DX INJ NEW DRUG ADDON: CPT

## 2018-12-06 PROCEDURE — 81001 URINALYSIS AUTO W/SCOPE: CPT | Performed by: EMERGENCY MEDICINE

## 2018-12-06 PROCEDURE — 99285 EMERGENCY DEPT VISIT HI MDM: CPT

## 2018-12-06 RX ORDER — MORPHINE SULFATE 2 MG/ML
2 INJECTION, SOLUTION INTRAMUSCULAR; INTRAVENOUS ONCE
Status: COMPLETED | OUTPATIENT
Start: 2018-12-06 | End: 2018-12-06

## 2018-12-06 RX ORDER — LIDOCAINE 50 MG/G
2 PATCH TOPICAL
Status: DISCONTINUED | OUTPATIENT
Start: 2018-12-07 | End: 2018-12-06

## 2018-12-06 RX ORDER — LIDOCAINE 50 MG/G
2 PATCH TOPICAL ONCE
Status: DISCONTINUED | OUTPATIENT
Start: 2018-12-06 | End: 2018-12-07 | Stop reason: HOSPADM

## 2018-12-06 RX ORDER — SODIUM CHLORIDE 0.9 % (FLUSH) 0.9 %
10 SYRINGE (ML) INJECTION AS NEEDED
Status: DISCONTINUED | OUTPATIENT
Start: 2018-12-06 | End: 2018-12-07 | Stop reason: HOSPADM

## 2018-12-06 RX ORDER — ONDANSETRON 2 MG/ML
4 INJECTION INTRAMUSCULAR; INTRAVENOUS ONCE
Status: COMPLETED | OUTPATIENT
Start: 2018-12-06 | End: 2018-12-06

## 2018-12-06 RX ORDER — HYDROCODONE BITARTRATE AND ACETAMINOPHEN 5; 325 MG/1; MG/1
1 TABLET ORAL EVERY 6 HOURS PRN
Qty: 15 TABLET | Refills: 0 | Status: SHIPPED | OUTPATIENT
Start: 2018-12-06 | End: 2019-01-03

## 2018-12-06 RX ORDER — LIDOCAINE 50 MG/G
2 PATCH TOPICAL EVERY 24 HOURS
Qty: 15 EACH | Refills: 0 | Status: SHIPPED | OUTPATIENT
Start: 2018-12-06 | End: 2019-04-25

## 2018-12-06 RX ADMIN — ONDANSETRON 4 MG: 2 INJECTION INTRAMUSCULAR; INTRAVENOUS at 19:50

## 2018-12-06 RX ADMIN — MORPHINE SULFATE 2 MG: 10 INJECTION INTRAVENOUS at 19:52

## 2018-12-06 RX ADMIN — LIDOCAINE 2 PATCH: 50 PATCH CUTANEOUS at 21:56

## 2018-12-08 ENCOUNTER — DOCUMENTATION (OUTPATIENT)
Dept: SPORTS MEDICINE | Facility: CLINIC | Age: 83
End: 2018-12-08

## 2018-12-09 ENCOUNTER — APPOINTMENT (OUTPATIENT)
Dept: CT IMAGING | Facility: HOSPITAL | Age: 83
End: 2018-12-09

## 2018-12-09 ENCOUNTER — HOSPITAL ENCOUNTER (INPATIENT)
Facility: HOSPITAL | Age: 83
LOS: 10 days | Discharge: HOME HEALTH | End: 2018-12-19
Attending: EMERGENCY MEDICINE | Admitting: HOSPITALIST

## 2018-12-09 DIAGNOSIS — K56.609 SMALL BOWEL OBSTRUCTION (HCC): Primary | ICD-10-CM

## 2018-12-09 DIAGNOSIS — Z99.2 ESRD (END STAGE RENAL DISEASE) ON DIALYSIS (HCC): ICD-10-CM

## 2018-12-09 DIAGNOSIS — D72.829 LEUKOCYTOSIS, UNSPECIFIED TYPE: ICD-10-CM

## 2018-12-09 DIAGNOSIS — N18.6 ESRD (END STAGE RENAL DISEASE) ON DIALYSIS (HCC): ICD-10-CM

## 2018-12-09 PROBLEM — S22.41XA CLOSED FRACTURE OF MULTIPLE RIBS OF RIGHT SIDE: Status: ACTIVE | Noted: 2018-12-09

## 2018-12-09 PROBLEM — N28.1 KIDNEY CYSTS: Status: ACTIVE | Noted: 2018-12-09

## 2018-12-09 PROBLEM — J90 PLEURAL EFFUSION ON RIGHT: Status: ACTIVE | Noted: 2018-12-09

## 2018-12-09 PROBLEM — K76.89 LIVER CYST: Status: ACTIVE | Noted: 2018-12-09

## 2018-12-09 PROBLEM — I10 HYPERTENSION: Status: ACTIVE | Noted: 2018-12-09

## 2018-12-09 LAB
ALBUMIN SERPL-MCNC: 4.4 G/DL (ref 3.5–5.2)
ALBUMIN/GLOB SERPL: 1.5 G/DL
ALP SERPL-CCNC: 77 U/L (ref 39–117)
ALT SERPL W P-5'-P-CCNC: 19 U/L (ref 1–41)
ANION GAP SERPL CALCULATED.3IONS-SCNC: 19.6 MMOL/L
APTT PPP: 56.4 SECONDS (ref 22.7–35.4)
AST SERPL-CCNC: 19 U/L (ref 1–40)
BACTERIA UR QL AUTO: NORMAL /HPF
BASOPHILS # BLD AUTO: 0.01 10*3/MM3 (ref 0–0.2)
BASOPHILS NFR BLD AUTO: 0.1 % (ref 0–1.5)
BILIRUB SERPL-MCNC: 0.6 MG/DL (ref 0.1–1.2)
BILIRUB UR QL STRIP: NEGATIVE
BUN BLD-MCNC: 39 MG/DL (ref 8–23)
BUN/CREAT SERPL: 7.5 (ref 7–25)
CALCIUM SPEC-SCNC: 10.6 MG/DL (ref 8.6–10.5)
CHLORIDE SERPL-SCNC: 93 MMOL/L (ref 98–107)
CLARITY UR: CLEAR
CO2 SERPL-SCNC: 24.4 MMOL/L (ref 22–29)
COLOR UR: YELLOW
CREAT BLD-MCNC: 5.21 MG/DL (ref 0.76–1.27)
DEPRECATED RDW RBC AUTO: 52.1 FL (ref 37–54)
EOSINOPHIL # BLD AUTO: 0.01 10*3/MM3 (ref 0–0.7)
EOSINOPHIL NFR BLD AUTO: 0.1 % (ref 0.3–6.2)
ERYTHROCYTE [DISTWIDTH] IN BLOOD BY AUTOMATED COUNT: 14.8 % (ref 11.5–14.5)
GFR SERPL CREATININE-BSD FRML MDRD: 10 ML/MIN/1.73
GFR SERPL CREATININE-BSD FRML MDRD: ABNORMAL ML/MIN/1.73
GLOBULIN UR ELPH-MCNC: 3 GM/DL
GLUCOSE BLD-MCNC: 175 MG/DL (ref 65–99)
GLUCOSE UR STRIP-MCNC: ABNORMAL MG/DL
HCT VFR BLD AUTO: 37.4 % (ref 40.4–52.2)
HGB BLD-MCNC: 11.8 G/DL (ref 13.7–17.6)
HGB UR QL STRIP.AUTO: NEGATIVE
HYALINE CASTS UR QL AUTO: NORMAL /LPF
IMM GRANULOCYTES # BLD: 0.04 10*3/MM3 (ref 0–0.03)
IMM GRANULOCYTES NFR BLD: 0.2 % (ref 0–0.5)
INR PPP: 2.98 (ref 0.9–1.1)
KETONES UR QL STRIP: NEGATIVE
LEUKOCYTE ESTERASE UR QL STRIP.AUTO: NEGATIVE
LYMPHOCYTES # BLD AUTO: 0.57 10*3/MM3 (ref 0.9–4.8)
LYMPHOCYTES NFR BLD AUTO: 3 % (ref 19.6–45.3)
MAGNESIUM SERPL-MCNC: 2.4 MG/DL (ref 1.6–2.4)
MCH RBC QN AUTO: 31 PG (ref 27–32.7)
MCHC RBC AUTO-ENTMCNC: 31.6 G/DL (ref 32.6–36.4)
MCV RBC AUTO: 98.2 FL (ref 79.8–96.2)
MONOCYTES # BLD AUTO: 1.24 10*3/MM3 (ref 0.2–1.2)
MONOCYTES NFR BLD AUTO: 6.5 % (ref 5–12)
NEUTROPHILS # BLD AUTO: 17.1 10*3/MM3 (ref 1.9–8.1)
NEUTROPHILS NFR BLD AUTO: 90.1 % (ref 42.7–76)
NITRITE UR QL STRIP: NEGATIVE
PH UR STRIP.AUTO: 8 [PH] (ref 5–8)
PLATELET # BLD AUTO: 252 10*3/MM3 (ref 140–500)
PMV BLD AUTO: 10.2 FL (ref 6–12)
POTASSIUM BLD-SCNC: 4.4 MMOL/L (ref 3.5–5.2)
PROT SERPL-MCNC: 7.4 G/DL (ref 6–8.5)
PROT UR QL STRIP: ABNORMAL
PROTHROMBIN TIME: 30.5 SECONDS (ref 11.7–14.2)
RBC # BLD AUTO: 3.81 10*6/MM3 (ref 4.6–6)
RBC # UR: NORMAL /HPF
REF LAB TEST METHOD: NORMAL
SODIUM BLD-SCNC: 137 MMOL/L (ref 136–145)
SP GR UR STRIP: 1.02 (ref 1–1.03)
SQUAMOUS #/AREA URNS HPF: NORMAL /HPF
UROBILINOGEN UR QL STRIP: ABNORMAL
WBC NRBC COR # BLD: 18.97 10*3/MM3 (ref 4.5–10.7)
WBC UR QL AUTO: NORMAL /HPF

## 2018-12-09 PROCEDURE — 74177 CT ABD & PELVIS W/CONTRAST: CPT

## 2018-12-09 PROCEDURE — 99221 1ST HOSP IP/OBS SF/LOW 40: CPT | Performed by: SURGERY

## 2018-12-09 PROCEDURE — 99231 SBSQ HOSP IP/OBS SF/LOW 25: CPT | Performed by: INTERNAL MEDICINE

## 2018-12-09 PROCEDURE — 93010 ELECTROCARDIOGRAM REPORT: CPT | Performed by: INTERNAL MEDICINE

## 2018-12-09 PROCEDURE — 25010000002 ONDANSETRON PER 1 MG: Performed by: NURSE PRACTITIONER

## 2018-12-09 PROCEDURE — 85730 THROMBOPLASTIN TIME PARTIAL: CPT | Performed by: NURSE PRACTITIONER

## 2018-12-09 PROCEDURE — 85610 PROTHROMBIN TIME: CPT | Performed by: NURSE PRACTITIONER

## 2018-12-09 PROCEDURE — 85025 COMPLETE CBC W/AUTO DIFF WBC: CPT | Performed by: NURSE PRACTITIONER

## 2018-12-09 PROCEDURE — 25010000002 IOPAMIDOL 61 % SOLUTION: Performed by: EMERGENCY MEDICINE

## 2018-12-09 PROCEDURE — 81001 URINALYSIS AUTO W/SCOPE: CPT | Performed by: NURSE PRACTITIONER

## 2018-12-09 PROCEDURE — 83735 ASSAY OF MAGNESIUM: CPT | Performed by: NURSE PRACTITIONER

## 2018-12-09 PROCEDURE — 93005 ELECTROCARDIOGRAM TRACING: CPT | Performed by: INTERNAL MEDICINE

## 2018-12-09 PROCEDURE — 25010000002 HYDROMORPHONE PER 4 MG: Performed by: NURSE PRACTITIONER

## 2018-12-09 PROCEDURE — 99285 EMERGENCY DEPT VISIT HI MDM: CPT

## 2018-12-09 PROCEDURE — 80053 COMPREHEN METABOLIC PANEL: CPT | Performed by: NURSE PRACTITIONER

## 2018-12-09 RX ORDER — SEVELAMER CARBONATE 800 MG/1
800 TABLET, FILM COATED ORAL
Status: DISCONTINUED | OUTPATIENT
Start: 2018-12-09 | End: 2018-12-10

## 2018-12-09 RX ORDER — ONDANSETRON 2 MG/ML
4 INJECTION INTRAMUSCULAR; INTRAVENOUS ONCE
Status: COMPLETED | OUTPATIENT
Start: 2018-12-09 | End: 2018-12-09

## 2018-12-09 RX ORDER — FOLIC ACID/VIT B COMPLEX AND C 0.8 MG
TABLET ORAL DAILY
Status: DISCONTINUED | OUTPATIENT
Start: 2018-12-09 | End: 2018-12-19 | Stop reason: HOSPADM

## 2018-12-09 RX ORDER — HYDROCODONE BITARTRATE AND ACETAMINOPHEN 5; 325 MG/1; MG/1
1 TABLET ORAL EVERY 6 HOURS PRN
Status: DISCONTINUED | OUTPATIENT
Start: 2018-12-09 | End: 2018-12-19 | Stop reason: HOSPADM

## 2018-12-09 RX ORDER — DILTIAZEM HYDROCHLORIDE 120 MG/1
120 CAPSULE, COATED, EXTENDED RELEASE ORAL
Status: DISCONTINUED | OUTPATIENT
Start: 2018-12-09 | End: 2018-12-10

## 2018-12-09 RX ORDER — FINASTERIDE 5 MG/1
5 TABLET, FILM COATED ORAL DAILY
Status: DISCONTINUED | OUTPATIENT
Start: 2018-12-09 | End: 2018-12-19 | Stop reason: HOSPADM

## 2018-12-09 RX ORDER — ONDANSETRON 2 MG/ML
4 INJECTION INTRAMUSCULAR; INTRAVENOUS EVERY 6 HOURS PRN
Status: DISCONTINUED | OUTPATIENT
Start: 2018-12-09 | End: 2018-12-19 | Stop reason: HOSPADM

## 2018-12-09 RX ORDER — GUAIFENESIN 600 MG/1
600 TABLET, EXTENDED RELEASE ORAL EVERY 12 HOURS SCHEDULED
Status: DISCONTINUED | OUTPATIENT
Start: 2018-12-09 | End: 2018-12-19 | Stop reason: HOSPADM

## 2018-12-09 RX ORDER — LIDOCAINE 50 MG/G
2 PATCH TOPICAL EVERY 24 HOURS
Status: DISCONTINUED | OUTPATIENT
Start: 2018-12-09 | End: 2018-12-19 | Stop reason: HOSPADM

## 2018-12-09 RX ORDER — SODIUM CHLORIDE 9 MG/ML
50 INJECTION, SOLUTION INTRAVENOUS CONTINUOUS
Status: DISCONTINUED | OUTPATIENT
Start: 2018-12-09 | End: 2018-12-11

## 2018-12-09 RX ORDER — HYDROMORPHONE HYDROCHLORIDE 1 MG/ML
0.5 INJECTION, SOLUTION INTRAMUSCULAR; INTRAVENOUS; SUBCUTANEOUS ONCE
Status: COMPLETED | OUTPATIENT
Start: 2018-12-09 | End: 2018-12-09

## 2018-12-09 RX ORDER — SODIUM CHLORIDE 0.9 % (FLUSH) 0.9 %
10 SYRINGE (ML) INJECTION AS NEEDED
Status: DISCONTINUED | OUTPATIENT
Start: 2018-12-09 | End: 2018-12-19 | Stop reason: HOSPADM

## 2018-12-09 RX ADMIN — SODIUM CHLORIDE 500 ML: 9 INJECTION, SOLUTION INTRAVENOUS at 03:32

## 2018-12-09 RX ADMIN — LIDOCAINE 2 PATCH: 50 PATCH CUTANEOUS at 14:24

## 2018-12-09 RX ADMIN — HYDROMORPHONE HYDROCHLORIDE 0.5 MG: 1 INJECTION, SOLUTION INTRAMUSCULAR; INTRAVENOUS; SUBCUTANEOUS at 03:31

## 2018-12-09 RX ADMIN — Medication 1 TABLET: at 12:20

## 2018-12-09 RX ADMIN — METOPROLOL TARTRATE 5 MG: 5 INJECTION, SOLUTION INTRAVENOUS at 19:18

## 2018-12-09 RX ADMIN — SODIUM CHLORIDE 50 ML/HR: 9 INJECTION, SOLUTION INTRAVENOUS at 09:08

## 2018-12-09 RX ADMIN — DILTIAZEM HYDROCHLORIDE 120 MG: 120 CAPSULE, COATED, EXTENDED RELEASE ORAL at 12:20

## 2018-12-09 RX ADMIN — ONDANSETRON 4 MG: 2 INJECTION INTRAMUSCULAR; INTRAVENOUS at 03:31

## 2018-12-09 RX ADMIN — IOPAMIDOL 85 ML: 612 INJECTION, SOLUTION INTRAVENOUS at 04:17

## 2018-12-10 ENCOUNTER — APPOINTMENT (OUTPATIENT)
Dept: GENERAL RADIOLOGY | Facility: HOSPITAL | Age: 83
End: 2018-12-10

## 2018-12-10 LAB
ALBUMIN SERPL-MCNC: 4 G/DL (ref 3.5–5.2)
ANION GAP SERPL CALCULATED.3IONS-SCNC: 20.7 MMOL/L
BUN BLD-MCNC: 68 MG/DL (ref 8–23)
BUN/CREAT SERPL: 9.2 (ref 7–25)
CALCIUM SPEC-SCNC: 9.3 MG/DL (ref 8.6–10.5)
CHLORIDE SERPL-SCNC: 95 MMOL/L (ref 98–107)
CO2 SERPL-SCNC: 22.3 MMOL/L (ref 22–29)
CREAT BLD-MCNC: 7.4 MG/DL (ref 0.76–1.27)
GFR SERPL CREATININE-BSD FRML MDRD: 7 ML/MIN/1.73
GFR SERPL CREATININE-BSD FRML MDRD: ABNORMAL ML/MIN/1.73
GLUCOSE BLD-MCNC: 158 MG/DL (ref 65–99)
INR PPP: 4.92 (ref 0.9–1.1)
MAGNESIUM SERPL-MCNC: 2.5 MG/DL (ref 1.6–2.4)
PHOSPHATE SERPL-MCNC: 6.1 MG/DL (ref 2.5–4.5)
POTASSIUM BLD-SCNC: 4.9 MMOL/L (ref 3.5–5.2)
PROTHROMBIN TIME: 45.2 SECONDS (ref 11.7–14.2)
SODIUM BLD-SCNC: 138 MMOL/L (ref 136–145)
TROPONIN T SERPL-MCNC: 0.09 NG/ML (ref 0–0.03)

## 2018-12-10 PROCEDURE — 99231 SBSQ HOSP IP/OBS SF/LOW 25: CPT | Performed by: SURGERY

## 2018-12-10 PROCEDURE — 83735 ASSAY OF MAGNESIUM: CPT | Performed by: INTERNAL MEDICINE

## 2018-12-10 PROCEDURE — 5A1D70Z PERFORMANCE OF URINARY FILTRATION, INTERMITTENT, LESS THAN 6 HOURS PER DAY: ICD-10-PCS | Performed by: INTERNAL MEDICINE

## 2018-12-10 PROCEDURE — 0D9670Z DRAINAGE OF STOMACH WITH DRAINAGE DEVICE, VIA NATURAL OR ARTIFICIAL OPENING: ICD-10-PCS | Performed by: RADIOLOGY

## 2018-12-10 PROCEDURE — 85610 PROTHROMBIN TIME: CPT | Performed by: INTERNAL MEDICINE

## 2018-12-10 PROCEDURE — 80069 RENAL FUNCTION PANEL: CPT | Performed by: INTERNAL MEDICINE

## 2018-12-10 PROCEDURE — 84484 ASSAY OF TROPONIN QUANT: CPT | Performed by: INTERNAL MEDICINE

## 2018-12-10 PROCEDURE — 25010000002 ONDANSETRON PER 1 MG: Performed by: HOSPITALIST

## 2018-12-10 PROCEDURE — 99233 SBSQ HOSP IP/OBS HIGH 50: CPT | Performed by: INTERNAL MEDICINE

## 2018-12-10 RX ORDER — ALBUMIN (HUMAN) 12.5 G/50ML
12.5 SOLUTION INTRAVENOUS AS NEEDED
Status: ACTIVE | OUTPATIENT
Start: 2018-12-10 | End: 2018-12-10

## 2018-12-10 RX ADMIN — METOPROLOL TARTRATE 5 MG: 5 INJECTION, SOLUTION INTRAVENOUS at 08:45

## 2018-12-10 RX ADMIN — SODIUM CHLORIDE 5 MG/HR: 900 INJECTION, SOLUTION INTRAVENOUS at 13:00

## 2018-12-10 RX ADMIN — SODIUM CHLORIDE 50 ML/HR: 9 INJECTION, SOLUTION INTRAVENOUS at 13:00

## 2018-12-10 RX ADMIN — LIDOCAINE 2 PATCH: 50 PATCH CUTANEOUS at 13:58

## 2018-12-10 RX ADMIN — SODIUM CHLORIDE 50 ML/HR: 9 INJECTION, SOLUTION INTRAVENOUS at 03:53

## 2018-12-10 RX ADMIN — ONDANSETRON 4 MG: 2 INJECTION INTRAMUSCULAR; INTRAVENOUS at 08:05

## 2018-12-11 ENCOUNTER — APPOINTMENT (OUTPATIENT)
Dept: GENERAL RADIOLOGY | Facility: HOSPITAL | Age: 83
End: 2018-12-11

## 2018-12-11 ENCOUNTER — APPOINTMENT (OUTPATIENT)
Dept: CARDIOLOGY | Facility: HOSPITAL | Age: 83
End: 2018-12-11
Attending: INTERNAL MEDICINE

## 2018-12-11 LAB
ALBUMIN SERPL-MCNC: 4.1 G/DL (ref 3.5–5.2)
ANION GAP SERPL CALCULATED.3IONS-SCNC: 21.6 MMOL/L
BUN BLD-MCNC: 34 MG/DL (ref 8–23)
BUN/CREAT SERPL: 7.8 (ref 7–25)
CALCIUM SPEC-SCNC: 9.2 MG/DL (ref 8.6–10.5)
CHLORIDE SERPL-SCNC: 99 MMOL/L (ref 98–107)
CO2 SERPL-SCNC: 26.4 MMOL/L (ref 22–29)
CREAT BLD-MCNC: 4.36 MG/DL (ref 0.76–1.27)
DEPRECATED RDW RBC AUTO: 51.2 FL (ref 37–54)
ERYTHROCYTE [DISTWIDTH] IN BLOOD BY AUTOMATED COUNT: 15.1 % (ref 11.5–14.5)
GFR SERPL CREATININE-BSD FRML MDRD: 13 ML/MIN/1.73
GFR SERPL CREATININE-BSD FRML MDRD: ABNORMAL ML/MIN/1.73
GLUCOSE BLD-MCNC: 133 MG/DL (ref 65–99)
HCT VFR BLD AUTO: 33.7 % (ref 40.4–52.2)
HGB BLD-MCNC: 10.8 G/DL (ref 13.7–17.6)
INR PPP: 5.37 (ref 0.9–1.1)
MCH RBC QN AUTO: 30.3 PG (ref 27–32.7)
MCHC RBC AUTO-ENTMCNC: 32 G/DL (ref 32.6–36.4)
MCV RBC AUTO: 94.4 FL (ref 79.8–96.2)
PHOSPHATE SERPL-MCNC: 4.5 MG/DL (ref 2.5–4.5)
PLATELET # BLD AUTO: 237 10*3/MM3 (ref 140–500)
PMV BLD AUTO: 10.3 FL (ref 6–12)
POTASSIUM BLD-SCNC: 4.1 MMOL/L (ref 3.5–5.2)
PROTHROMBIN TIME: 48.4 SECONDS (ref 11.7–14.2)
RBC # BLD AUTO: 3.57 10*6/MM3 (ref 4.6–6)
SODIUM BLD-SCNC: 147 MMOL/L (ref 136–145)
WBC NRBC COR # BLD: 7.15 10*3/MM3 (ref 4.5–10.7)

## 2018-12-11 PROCEDURE — 80069 RENAL FUNCTION PANEL: CPT | Performed by: INTERNAL MEDICINE

## 2018-12-11 PROCEDURE — 25010000002 ONDANSETRON PER 1 MG: Performed by: HOSPITALIST

## 2018-12-11 PROCEDURE — 85610 PROTHROMBIN TIME: CPT | Performed by: INTERNAL MEDICINE

## 2018-12-11 PROCEDURE — 97110 THERAPEUTIC EXERCISES: CPT

## 2018-12-11 PROCEDURE — 94799 UNLISTED PULMONARY SVC/PX: CPT

## 2018-12-11 PROCEDURE — 99231 SBSQ HOSP IP/OBS SF/LOW 25: CPT | Performed by: SURGERY

## 2018-12-11 PROCEDURE — 93306 TTE W/DOPPLER COMPLETE: CPT | Performed by: INTERNAL MEDICINE

## 2018-12-11 PROCEDURE — 97162 PT EVAL MOD COMPLEX 30 MIN: CPT

## 2018-12-11 PROCEDURE — 93306 TTE W/DOPPLER COMPLETE: CPT

## 2018-12-11 PROCEDURE — 85027 COMPLETE CBC AUTOMATED: CPT | Performed by: HOSPITALIST

## 2018-12-11 PROCEDURE — 0D9670Z DRAINAGE OF STOMACH WITH DRAINAGE DEVICE, VIA NATURAL OR ARTIFICIAL OPENING: ICD-10-PCS | Performed by: RADIOLOGY

## 2018-12-11 RX ORDER — DEXTROSE MONOHYDRATE 50 MG/ML
30 INJECTION, SOLUTION INTRAVENOUS CONTINUOUS
Status: DISCONTINUED | OUTPATIENT
Start: 2018-12-11 | End: 2018-12-16

## 2018-12-11 RX ADMIN — SODIUM CHLORIDE 50 ML/HR: 9 INJECTION, SOLUTION INTRAVENOUS at 00:24

## 2018-12-11 RX ADMIN — METOPROLOL TARTRATE 5 MG: 5 INJECTION, SOLUTION INTRAVENOUS at 09:47

## 2018-12-11 RX ADMIN — METOPROLOL TARTRATE 5 MG: 5 INJECTION, SOLUTION INTRAVENOUS at 00:24

## 2018-12-11 RX ADMIN — ONDANSETRON 4 MG: 2 INJECTION INTRAMUSCULAR; INTRAVENOUS at 09:47

## 2018-12-11 RX ADMIN — LIDOCAINE 2 PATCH: 50 PATCH CUTANEOUS at 14:22

## 2018-12-11 RX ADMIN — METOPROLOL TARTRATE 5 MG: 5 INJECTION, SOLUTION INTRAVENOUS at 17:58

## 2018-12-11 RX ADMIN — ONDANSETRON 4 MG: 2 INJECTION INTRAMUSCULAR; INTRAVENOUS at 02:20

## 2018-12-11 RX ADMIN — DEXTROSE MONOHYDRATE 50 ML/HR: 50 INJECTION, SOLUTION INTRAVENOUS at 09:47

## 2018-12-12 PROBLEM — G93.41 METABOLIC ENCEPHALOPATHY: Status: ACTIVE | Noted: 2018-12-12

## 2018-12-12 PROBLEM — D72.829 LEUKOCYTOSIS: Status: RESOLVED | Noted: 2018-12-09 | Resolved: 2018-12-12

## 2018-12-12 LAB
ALBUMIN SERPL-MCNC: 3.1 G/DL (ref 3.5–5.2)
ANION GAP SERPL CALCULATED.3IONS-SCNC: 18.9 MMOL/L
ASCENDING AORTA: 3.7 CM
BASOPHILS # BLD AUTO: 0.01 10*3/MM3 (ref 0–0.2)
BASOPHILS NFR BLD AUTO: 0.1 % (ref 0–1.5)
BH CV ECHO MEAS - ACS: 2.6 CM
BH CV ECHO MEAS - AO MAX PG (FULL): 7 MMHG
BH CV ECHO MEAS - AO MAX PG: 9.1 MMHG
BH CV ECHO MEAS - AO MEAN PG (FULL): 3 MMHG
BH CV ECHO MEAS - AO MEAN PG: 4 MMHG
BH CV ECHO MEAS - AO ROOT AREA (BSA CORRECTED): 2.2
BH CV ECHO MEAS - AO ROOT AREA: 15.9 CM^2
BH CV ECHO MEAS - AO ROOT DIAM: 4.5 CM
BH CV ECHO MEAS - AO V2 MAX: 151 CM/SEC
BH CV ECHO MEAS - AO V2 MEAN: 90.4 CM/SEC
BH CV ECHO MEAS - AO V2 VTI: 24.4 CM
BH CV ECHO MEAS - AVA(I,A): 1.9 CM^2
BH CV ECHO MEAS - AVA(I,D): 1.9 CM^2
BH CV ECHO MEAS - AVA(V,A): 1.7 CM^2
BH CV ECHO MEAS - AVA(V,D): 1.7 CM^2
BH CV ECHO MEAS - BSA(HAYCOCK): 2 M^2
BH CV ECHO MEAS - BSA: 2 M^2
BH CV ECHO MEAS - BZI_BMI: 23.1 KILOGRAMS/M^2
BH CV ECHO MEAS - BZI_METRIC_HEIGHT: 185.4 CM
BH CV ECHO MEAS - BZI_METRIC_WEIGHT: 79.4 KG
BH CV ECHO MEAS - EDV(MOD-SP2): 53 ML
BH CV ECHO MEAS - EDV(MOD-SP4): 61 ML
BH CV ECHO MEAS - EDV(TEICH): 139.5 ML
BH CV ECHO MEAS - EF(CUBED): 56.5 %
BH CV ECHO MEAS - EF(MOD-BP): 54 %
BH CV ECHO MEAS - EF(MOD-SP2): 50.9 %
BH CV ECHO MEAS - EF(MOD-SP4): 57.4 %
BH CV ECHO MEAS - EF(TEICH): 47.7 %
BH CV ECHO MEAS - ESV(MOD-SP2): 26 ML
BH CV ECHO MEAS - ESV(MOD-SP4): 26 ML
BH CV ECHO MEAS - ESV(TEICH): 72.9 ML
BH CV ECHO MEAS - FS: 24.2 %
BH CV ECHO MEAS - IVS/LVPW: 1
BH CV ECHO MEAS - IVSD: 0.98 CM
BH CV ECHO MEAS - LAT PEAK E' VEL: 7 CM/SEC
BH CV ECHO MEAS - LV DIASTOLIC VOL/BSA (35-75): 30 ML/M^2
BH CV ECHO MEAS - LV MASS(C)D: 193 GRAMS
BH CV ECHO MEAS - LV MASS(C)DI: 94.9 GRAMS/M^2
BH CV ECHO MEAS - LV MAX PG: 2.1 MMHG
BH CV ECHO MEAS - LV MEAN PG: 1 MMHG
BH CV ECHO MEAS - LV SYSTOLIC VOL/BSA (12-30): 13 ML/M^2
BH CV ECHO MEAS - LV V1 MAX: 72.4 CM/SEC
BH CV ECHO MEAS - LV V1 MEAN: 52 CM/SEC
BH CV ECHO MEAS - LV V1 VTI: 13.7 CM
BH CV ECHO MEAS - LVIDD: 5.4 CM
BH CV ECHO MEAS - LVIDS: 4.1 CM
BH CV ECHO MEAS - LVLD AP2: 6.6 CM
BH CV ECHO MEAS - LVLD AP4: 6.3 CM
BH CV ECHO MEAS - LVLS AP2: 5.9 CM
BH CV ECHO MEAS - LVLS AP4: 5.6 CM
BH CV ECHO MEAS - LVOT AREA (M): 3.5 CM^2
BH CV ECHO MEAS - LVOT AREA: 3.5 CM^2
BH CV ECHO MEAS - LVOT DIAM: 2.1 CM
BH CV ECHO MEAS - LVPWD: 0.94 CM
BH CV ECHO MEAS - MED PEAK E' VEL: 11 CM/SEC
BH CV ECHO MEAS - MR MAX PG: 141.1 MMHG
BH CV ECHO MEAS - MR MAX VEL: 594 CM/SEC
BH CV ECHO MEAS - MV DEC SLOPE: 622 CM/SEC^2
BH CV ECHO MEAS - MV DEC TIME: 0.16 SEC
BH CV ECHO MEAS - MV E MAX VEL: 94.6 CM/SEC
BH CV ECHO MEAS - MV MAX PG: 4.7 MMHG
BH CV ECHO MEAS - MV MEAN PG: 2 MMHG
BH CV ECHO MEAS - MV P1/2T MAX VEL: 92.6 CM/SEC
BH CV ECHO MEAS - MV P1/2T: 43.6 MSEC
BH CV ECHO MEAS - MV V2 MAX: 108 CM/SEC
BH CV ECHO MEAS - MV V2 MEAN: 59.9 CM/SEC
BH CV ECHO MEAS - MV V2 VTI: 17.2 CM
BH CV ECHO MEAS - MVA P1/2T LCG: 2.4 CM^2
BH CV ECHO MEAS - MVA(P1/2T): 5 CM^2
BH CV ECHO MEAS - MVA(VTI): 2.8 CM^2
BH CV ECHO MEAS - PA ACC TIME: 0.13 SEC
BH CV ECHO MEAS - PA MAX PG (FULL): 3 MMHG
BH CV ECHO MEAS - PA MAX PG: 4.6 MMHG
BH CV ECHO MEAS - PA PR(ACCEL): 20.5 MMHG
BH CV ECHO MEAS - PA V2 MAX: 107 CM/SEC
BH CV ECHO MEAS - PULM A REVS DUR: 0.11 SEC
BH CV ECHO MEAS - PULM A REVS VEL: 16.3 CM/SEC
BH CV ECHO MEAS - PULM DIAS VEL: 34.7 CM/SEC
BH CV ECHO MEAS - PULM S/D: 1
BH CV ECHO MEAS - PULM SYS VEL: 36 CM/SEC
BH CV ECHO MEAS - PVA(V,A): 2.5 CM^2
BH CV ECHO MEAS - PVA(V,D): 2.5 CM^2
BH CV ECHO MEAS - QP/QS: 0.95
BH CV ECHO MEAS - RV MAX PG: 1.6 MMHG
BH CV ECHO MEAS - RV MEAN PG: 1 MMHG
BH CV ECHO MEAS - RV V1 MAX: 63.5 CM/SEC
BH CV ECHO MEAS - RV V1 MEAN: 41.1 CM/SEC
BH CV ECHO MEAS - RV V1 VTI: 10.8 CM
BH CV ECHO MEAS - RVOT AREA: 4.2 CM^2
BH CV ECHO MEAS - RVOT DIAM: 2.3 CM
BH CV ECHO MEAS - SI(AO): 190.9 ML/M^2
BH CV ECHO MEAS - SI(CUBED): 43 ML/M^2
BH CV ECHO MEAS - SI(LVOT): 23.3 ML/M^2
BH CV ECHO MEAS - SI(MOD-SP2): 13.3 ML/M^2
BH CV ECHO MEAS - SI(MOD-SP4): 17.2 ML/M^2
BH CV ECHO MEAS - SI(TEICH): 32.7 ML/M^2
BH CV ECHO MEAS - SV(AO): 388.1 ML
BH CV ECHO MEAS - SV(CUBED): 87.4 ML
BH CV ECHO MEAS - SV(LVOT): 47.5 ML
BH CV ECHO MEAS - SV(MOD-SP2): 27 ML
BH CV ECHO MEAS - SV(MOD-SP4): 35 ML
BH CV ECHO MEAS - SV(RVOT): 44.9 ML
BH CV ECHO MEAS - SV(TEICH): 66.6 ML
BH CV ECHO MEAS - TAPSE (>1.6): 1.8 CM2
BH CV ECHO MEAS - TR MAX VEL: 252 CM/SEC
BH CV ECHO MEASUREMENTS AVERAGE E/E' RATIO: 10.51
BH CV XLRA - RV BASE: 4 CM
BH CV XLRA - TDI S': 16 CM/SEC
BUN BLD-MCNC: 66 MG/DL (ref 8–23)
BUN/CREAT SERPL: 10 (ref 7–25)
CALCIUM SPEC-SCNC: 9 MG/DL (ref 8.6–10.5)
CHLORIDE SERPL-SCNC: 98 MMOL/L (ref 98–107)
CO2 SERPL-SCNC: 27.1 MMOL/L (ref 22–29)
CREAT BLD-MCNC: 6.61 MG/DL (ref 0.76–1.27)
DEPRECATED RDW RBC AUTO: 53.1 FL (ref 37–54)
EOSINOPHIL # BLD AUTO: 0.08 10*3/MM3 (ref 0–0.7)
EOSINOPHIL NFR BLD AUTO: 1.2 % (ref 0.3–6.2)
ERYTHROCYTE [DISTWIDTH] IN BLOOD BY AUTOMATED COUNT: 15.2 % (ref 11.5–14.5)
GFR SERPL CREATININE-BSD FRML MDRD: 8 ML/MIN/1.73
GFR SERPL CREATININE-BSD FRML MDRD: ABNORMAL ML/MIN/1.73
GLUCOSE BLD-MCNC: 121 MG/DL (ref 65–99)
HCT VFR BLD AUTO: 31.8 % (ref 40.4–52.2)
HGB BLD-MCNC: 10.2 G/DL (ref 13.7–17.6)
IMM GRANULOCYTES # BLD: 0.03 10*3/MM3 (ref 0–0.03)
IMM GRANULOCYTES NFR BLD: 0.4 % (ref 0–0.5)
INR PPP: 5.82 (ref 0.9–1.1)
LEFT ATRIUM VOLUME INDEX: 41 ML/M2
LYMPHOCYTES # BLD AUTO: 0.85 10*3/MM3 (ref 0.9–4.8)
LYMPHOCYTES NFR BLD AUTO: 12.5 % (ref 19.6–45.3)
MCH RBC QN AUTO: 31 PG (ref 27–32.7)
MCHC RBC AUTO-ENTMCNC: 32.1 G/DL (ref 32.6–36.4)
MCV RBC AUTO: 96.7 FL (ref 79.8–96.2)
MONOCYTES # BLD AUTO: 1.36 10*3/MM3 (ref 0.2–1.2)
MONOCYTES NFR BLD AUTO: 20 % (ref 5–12)
NEUTROPHILS # BLD AUTO: 4.49 10*3/MM3 (ref 1.9–8.1)
NEUTROPHILS NFR BLD AUTO: 66.2 % (ref 42.7–76)
PHOSPHATE SERPL-MCNC: 6.1 MG/DL (ref 2.5–4.5)
PLAT MORPH BLD: NORMAL
PLATELET # BLD AUTO: 194 10*3/MM3 (ref 140–500)
PMV BLD AUTO: 10.3 FL (ref 6–12)
POTASSIUM BLD-SCNC: 4.2 MMOL/L (ref 3.5–5.2)
PROTHROMBIN TIME: 51.4 SECONDS (ref 11.7–14.2)
RBC # BLD AUTO: 3.29 10*6/MM3 (ref 4.6–6)
RBC MORPH BLD: NORMAL
SINUS: 3.9 CM
SODIUM BLD-SCNC: 144 MMOL/L (ref 136–145)
STJ: 3.3 CM
WBC MORPH BLD: NORMAL
WBC NRBC COR # BLD: 6.79 10*3/MM3 (ref 4.5–10.7)

## 2018-12-12 PROCEDURE — 85610 PROTHROMBIN TIME: CPT | Performed by: INTERNAL MEDICINE

## 2018-12-12 PROCEDURE — 85025 COMPLETE CBC W/AUTO DIFF WBC: CPT | Performed by: INTERNAL MEDICINE

## 2018-12-12 PROCEDURE — 99233 SBSQ HOSP IP/OBS HIGH 50: CPT | Performed by: INTERNAL MEDICINE

## 2018-12-12 PROCEDURE — 99231 SBSQ HOSP IP/OBS SF/LOW 25: CPT | Performed by: SURGERY

## 2018-12-12 PROCEDURE — 85007 BL SMEAR W/DIFF WBC COUNT: CPT | Performed by: INTERNAL MEDICINE

## 2018-12-12 PROCEDURE — 80069 RENAL FUNCTION PANEL: CPT | Performed by: INTERNAL MEDICINE

## 2018-12-12 RX ADMIN — METOPROLOL TARTRATE 5 MG: 5 INJECTION, SOLUTION INTRAVENOUS at 09:54

## 2018-12-12 RX ADMIN — DEXTROSE MONOHYDRATE 50 ML/HR: 50 INJECTION, SOLUTION INTRAVENOUS at 09:54

## 2018-12-12 RX ADMIN — LIDOCAINE 2 PATCH: 50 PATCH CUTANEOUS at 14:53

## 2018-12-12 RX ADMIN — METOPROLOL TARTRATE 5 MG: 5 INJECTION, SOLUTION INTRAVENOUS at 20:39

## 2018-12-12 RX ADMIN — METOPROLOL TARTRATE 5 MG: 5 INJECTION, SOLUTION INTRAVENOUS at 00:26

## 2018-12-13 ENCOUNTER — APPOINTMENT (OUTPATIENT)
Dept: GENERAL RADIOLOGY | Facility: HOSPITAL | Age: 83
End: 2018-12-13

## 2018-12-13 LAB
ANION GAP SERPL CALCULATED.3IONS-SCNC: 15 MMOL/L
BASOPHILS # BLD AUTO: 0.02 10*3/MM3 (ref 0–0.2)
BASOPHILS NFR BLD AUTO: 0.2 % (ref 0–1.5)
BUN BLD-MCNC: 40 MG/DL (ref 8–23)
BUN/CREAT SERPL: 8.9 (ref 7–25)
CALCIUM SPEC-SCNC: 9.4 MG/DL (ref 8.6–10.5)
CHLORIDE SERPL-SCNC: 97 MMOL/L (ref 98–107)
CO2 SERPL-SCNC: 30 MMOL/L (ref 22–29)
CREAT BLD-MCNC: 4.47 MG/DL (ref 0.76–1.27)
DEPRECATED RDW RBC AUTO: 50.8 FL (ref 37–54)
EOSINOPHIL # BLD AUTO: 0.13 10*3/MM3 (ref 0–0.7)
EOSINOPHIL NFR BLD AUTO: 1.5 % (ref 0.3–6.2)
ERYTHROCYTE [DISTWIDTH] IN BLOOD BY AUTOMATED COUNT: 14.6 % (ref 11.5–14.5)
GFR SERPL CREATININE-BSD FRML MDRD: 13 ML/MIN/1.73
GFR SERPL CREATININE-BSD FRML MDRD: ABNORMAL ML/MIN/1.73
GLUCOSE BLD-MCNC: 116 MG/DL (ref 65–99)
HCT VFR BLD AUTO: 31.6 % (ref 40.4–52.2)
HGB BLD-MCNC: 10.1 G/DL (ref 13.7–17.6)
IMM GRANULOCYTES # BLD: 0.09 10*3/MM3 (ref 0–0.03)
IMM GRANULOCYTES NFR BLD: 1 % (ref 0–0.5)
INR PPP: 3.29 (ref 0.9–1.1)
LYMPHOCYTES # BLD AUTO: 0.88 10*3/MM3 (ref 0.9–4.8)
LYMPHOCYTES NFR BLD AUTO: 10.2 % (ref 19.6–45.3)
MCH RBC QN AUTO: 30.3 PG (ref 27–32.7)
MCHC RBC AUTO-ENTMCNC: 32 G/DL (ref 32.6–36.4)
MCV RBC AUTO: 94.9 FL (ref 79.8–96.2)
MONOCYTES # BLD AUTO: 1.15 10*3/MM3 (ref 0.2–1.2)
MONOCYTES NFR BLD AUTO: 13.3 % (ref 5–12)
NEUTROPHILS # BLD AUTO: 6.48 10*3/MM3 (ref 1.9–8.1)
NEUTROPHILS NFR BLD AUTO: 74.8 % (ref 42.7–76)
PLATELET # BLD AUTO: 211 10*3/MM3 (ref 140–500)
PMV BLD AUTO: 10 FL (ref 6–12)
POTASSIUM BLD-SCNC: 3.6 MMOL/L (ref 3.5–5.2)
POTASSIUM BLD-SCNC: 3.9 MMOL/L (ref 3.5–5.2)
PROTHROMBIN TIME: 33 SECONDS (ref 11.7–14.2)
RBC # BLD AUTO: 3.33 10*6/MM3 (ref 4.6–6)
SODIUM BLD-SCNC: 142 MMOL/L (ref 136–145)
WBC NRBC COR # BLD: 8.66 10*3/MM3 (ref 4.5–10.7)

## 2018-12-13 PROCEDURE — 97110 THERAPEUTIC EXERCISES: CPT

## 2018-12-13 PROCEDURE — 85610 PROTHROMBIN TIME: CPT | Performed by: INTERNAL MEDICINE

## 2018-12-13 PROCEDURE — 99231 SBSQ HOSP IP/OBS SF/LOW 25: CPT | Performed by: SURGERY

## 2018-12-13 PROCEDURE — 99233 SBSQ HOSP IP/OBS HIGH 50: CPT | Performed by: INTERNAL MEDICINE

## 2018-12-13 PROCEDURE — 84132 ASSAY OF SERUM POTASSIUM: CPT | Performed by: HOSPITALIST

## 2018-12-13 PROCEDURE — 80048 BASIC METABOLIC PNL TOTAL CA: CPT | Performed by: INTERNAL MEDICINE

## 2018-12-13 PROCEDURE — 25010000003 POTASSIUM CHLORIDE 10 MEQ/100ML SOLUTION: Performed by: INTERNAL MEDICINE

## 2018-12-13 PROCEDURE — 74019 RADEX ABDOMEN 2 VIEWS: CPT

## 2018-12-13 PROCEDURE — 85025 COMPLETE CBC W/AUTO DIFF WBC: CPT | Performed by: INTERNAL MEDICINE

## 2018-12-13 RX ORDER — POTASSIUM CHLORIDE 7.45 MG/ML
10 INJECTION INTRAVENOUS
Status: DISCONTINUED | OUTPATIENT
Start: 2018-12-13 | End: 2018-12-18

## 2018-12-13 RX ADMIN — METOPROLOL TARTRATE 5 MG: 5 INJECTION, SOLUTION INTRAVENOUS at 14:57

## 2018-12-13 RX ADMIN — POTASSIUM CHLORIDE 10 MEQ: 7.46 INJECTION, SOLUTION INTRAVENOUS at 08:18

## 2018-12-13 RX ADMIN — DEXTROSE MONOHYDRATE 50 ML/HR: 50 INJECTION, SOLUTION INTRAVENOUS at 05:18

## 2018-12-13 RX ADMIN — LIDOCAINE 2 PATCH: 50 PATCH CUTANEOUS at 14:33

## 2018-12-13 RX ADMIN — METOPROLOL TARTRATE 5 MG: 5 INJECTION, SOLUTION INTRAVENOUS at 02:14

## 2018-12-13 RX ADMIN — POTASSIUM CHLORIDE 10 MEQ: 7.46 INJECTION, SOLUTION INTRAVENOUS at 09:38

## 2018-12-13 RX ADMIN — METOPROLOL TARTRATE 5 MG: 5 INJECTION, SOLUTION INTRAVENOUS at 21:13

## 2018-12-13 RX ADMIN — POTASSIUM CHLORIDE 10 MEQ: 7.46 INJECTION, SOLUTION INTRAVENOUS at 10:35

## 2018-12-13 RX ADMIN — METOPROLOL TARTRATE 5 MG: 5 INJECTION, SOLUTION INTRAVENOUS at 08:20

## 2018-12-13 RX ADMIN — POTASSIUM CHLORIDE 10 MEQ: 7.46 INJECTION, SOLUTION INTRAVENOUS at 11:40

## 2018-12-13 RX ADMIN — DEXTROSE MONOHYDRATE 50 ML/HR: 50 INJECTION, SOLUTION INTRAVENOUS at 23:23

## 2018-12-14 ENCOUNTER — APPOINTMENT (OUTPATIENT)
Dept: GENERAL RADIOLOGY | Facility: HOSPITAL | Age: 83
End: 2018-12-14

## 2018-12-14 LAB
ALBUMIN SERPL-MCNC: 3.1 G/DL (ref 3.5–5.2)
ANION GAP SERPL CALCULATED.3IONS-SCNC: 18.3 MMOL/L
BASOPHILS # BLD AUTO: 0.02 10*3/MM3 (ref 0–0.2)
BASOPHILS NFR BLD AUTO: 0.2 % (ref 0–1.5)
BUN BLD-MCNC: 59 MG/DL (ref 8–23)
BUN/CREAT SERPL: 9 (ref 7–25)
CALCIUM SPEC-SCNC: 9.2 MG/DL (ref 8.6–10.5)
CHLORIDE SERPL-SCNC: 94 MMOL/L (ref 98–107)
CO2 SERPL-SCNC: 26.7 MMOL/L (ref 22–29)
CREAT BLD-MCNC: 6.52 MG/DL (ref 0.76–1.27)
DEPRECATED RDW RBC AUTO: 49.9 FL (ref 37–54)
EOSINOPHIL # BLD AUTO: 0.19 10*3/MM3 (ref 0–0.7)
EOSINOPHIL NFR BLD AUTO: 1.7 % (ref 0.3–6.2)
ERYTHROCYTE [DISTWIDTH] IN BLOOD BY AUTOMATED COUNT: 14.5 % (ref 11.5–14.5)
GFR SERPL CREATININE-BSD FRML MDRD: 8 ML/MIN/1.73
GFR SERPL CREATININE-BSD FRML MDRD: ABNORMAL ML/MIN/1.73
GLUCOSE BLD-MCNC: 113 MG/DL (ref 65–99)
GLUCOSE BLDC GLUCOMTR-MCNC: 131 MG/DL (ref 70–130)
HBV SURFACE AG SERPL QL IA: NORMAL
HCT VFR BLD AUTO: 31.5 % (ref 40.4–52.2)
HGB BLD-MCNC: 10.1 G/DL (ref 13.7–17.6)
IMM GRANULOCYTES # BLD: 0.16 10*3/MM3 (ref 0–0.03)
IMM GRANULOCYTES NFR BLD: 1.4 % (ref 0–0.5)
INR PPP: 1.98 (ref 0.9–1.1)
LYMPHOCYTES # BLD AUTO: 1.09 10*3/MM3 (ref 0.9–4.8)
LYMPHOCYTES NFR BLD AUTO: 9.6 % (ref 19.6–45.3)
MCH RBC QN AUTO: 30.3 PG (ref 27–32.7)
MCHC RBC AUTO-ENTMCNC: 32.1 G/DL (ref 32.6–36.4)
MCV RBC AUTO: 94.6 FL (ref 79.8–96.2)
MONOCYTES # BLD AUTO: 1.45 10*3/MM3 (ref 0.2–1.2)
MONOCYTES NFR BLD AUTO: 12.8 % (ref 5–12)
NEUTROPHILS # BLD AUTO: 8.57 10*3/MM3 (ref 1.9–8.1)
NEUTROPHILS NFR BLD AUTO: 75.7 % (ref 42.7–76)
PHOSPHATE SERPL-MCNC: 4.7 MG/DL (ref 2.5–4.5)
PLATELET # BLD AUTO: 233 10*3/MM3 (ref 140–500)
PMV BLD AUTO: 10 FL (ref 6–12)
POTASSIUM BLD-SCNC: 3.9 MMOL/L (ref 3.5–5.2)
PROTHROMBIN TIME: 22.1 SECONDS (ref 11.7–14.2)
RBC # BLD AUTO: 3.33 10*6/MM3 (ref 4.6–6)
SODIUM BLD-SCNC: 139 MMOL/L (ref 136–145)
WBC NRBC COR # BLD: 11.32 10*3/MM3 (ref 4.5–10.7)

## 2018-12-14 PROCEDURE — 85025 COMPLETE CBC W/AUTO DIFF WBC: CPT | Performed by: INTERNAL MEDICINE

## 2018-12-14 PROCEDURE — 99232 SBSQ HOSP IP/OBS MODERATE 35: CPT | Performed by: NURSE PRACTITIONER

## 2018-12-14 PROCEDURE — 99231 SBSQ HOSP IP/OBS SF/LOW 25: CPT | Performed by: SURGERY

## 2018-12-14 PROCEDURE — 87340 HEPATITIS B SURFACE AG IA: CPT | Performed by: INTERNAL MEDICINE

## 2018-12-14 PROCEDURE — 74018 RADEX ABDOMEN 1 VIEW: CPT

## 2018-12-14 PROCEDURE — 80069 RENAL FUNCTION PANEL: CPT | Performed by: INTERNAL MEDICINE

## 2018-12-14 PROCEDURE — 82962 GLUCOSE BLOOD TEST: CPT

## 2018-12-14 PROCEDURE — 85610 PROTHROMBIN TIME: CPT | Performed by: INTERNAL MEDICINE

## 2018-12-14 PROCEDURE — 5A1D70Z PERFORMANCE OF URINARY FILTRATION, INTERMITTENT, LESS THAN 6 HOURS PER DAY: ICD-10-PCS | Performed by: INTERNAL MEDICINE

## 2018-12-14 RX ORDER — ALBUMIN (HUMAN) 12.5 G/50ML
12.5 SOLUTION INTRAVENOUS AS NEEDED
Status: ACTIVE | OUTPATIENT
Start: 2018-12-14 | End: 2018-12-14

## 2018-12-14 RX ADMIN — DEXTROSE MONOHYDRATE 30 ML/HR: 50 INJECTION, SOLUTION INTRAVENOUS at 20:12

## 2018-12-14 RX ADMIN — METOPROLOL TARTRATE 5 MG: 5 INJECTION, SOLUTION INTRAVENOUS at 20:56

## 2018-12-14 RX ADMIN — METOPROLOL TARTRATE 5 MG: 5 INJECTION, SOLUTION INTRAVENOUS at 02:16

## 2018-12-14 RX ADMIN — LIDOCAINE 2 PATCH: 50 PATCH CUTANEOUS at 13:55

## 2018-12-15 LAB
ANION GAP SERPL CALCULATED.3IONS-SCNC: 15.1 MMOL/L
BASOPHILS # BLD AUTO: 0.02 10*3/MM3 (ref 0–0.2)
BASOPHILS NFR BLD AUTO: 0.2 % (ref 0–1.5)
BUN BLD-MCNC: 38 MG/DL (ref 8–23)
BUN/CREAT SERPL: 8.2 (ref 7–25)
CALCIUM SPEC-SCNC: 9.4 MG/DL (ref 8.6–10.5)
CHLORIDE SERPL-SCNC: 96 MMOL/L (ref 98–107)
CO2 SERPL-SCNC: 26.9 MMOL/L (ref 22–29)
CREAT BLD-MCNC: 4.65 MG/DL (ref 0.76–1.27)
DEPRECATED RDW RBC AUTO: 49.3 FL (ref 37–54)
EOSINOPHIL # BLD AUTO: 0.21 10*3/MM3 (ref 0–0.7)
EOSINOPHIL NFR BLD AUTO: 1.6 % (ref 0.3–6.2)
ERYTHROCYTE [DISTWIDTH] IN BLOOD BY AUTOMATED COUNT: 14.5 % (ref 11.5–14.5)
GFR SERPL CREATININE-BSD FRML MDRD: 12 ML/MIN/1.73
GFR SERPL CREATININE-BSD FRML MDRD: ABNORMAL ML/MIN/1.73
GLUCOSE BLD-MCNC: 103 MG/DL (ref 65–99)
GLUCOSE BLDC GLUCOMTR-MCNC: 108 MG/DL (ref 70–130)
GLUCOSE BLDC GLUCOMTR-MCNC: 119 MG/DL (ref 70–130)
GLUCOSE BLDC GLUCOMTR-MCNC: 120 MG/DL (ref 70–130)
HCT VFR BLD AUTO: 32.8 % (ref 40.4–52.2)
HGB BLD-MCNC: 10.5 G/DL (ref 13.7–17.6)
IMM GRANULOCYTES # BLD: 0.28 10*3/MM3 (ref 0–0.03)
IMM GRANULOCYTES NFR BLD: 2.2 % (ref 0–0.5)
INR PPP: 1.68 (ref 0.9–1.1)
LYMPHOCYTES # BLD AUTO: 1.11 10*3/MM3 (ref 0.9–4.8)
LYMPHOCYTES NFR BLD AUTO: 8.6 % (ref 19.6–45.3)
MCH RBC QN AUTO: 30.2 PG (ref 27–32.7)
MCHC RBC AUTO-ENTMCNC: 32 G/DL (ref 32.6–36.4)
MCV RBC AUTO: 94.3 FL (ref 79.8–96.2)
MONOCYTES # BLD AUTO: 1.56 10*3/MM3 (ref 0.2–1.2)
MONOCYTES NFR BLD AUTO: 12.1 % (ref 5–12)
NEUTROPHILS # BLD AUTO: 10.02 10*3/MM3 (ref 1.9–8.1)
NEUTROPHILS NFR BLD AUTO: 77.5 % (ref 42.7–76)
NRBC BLD MANUAL-RTO: 0 /100 WBC (ref 0–0)
PLATELET # BLD AUTO: 236 10*3/MM3 (ref 140–500)
PMV BLD AUTO: 10.5 FL (ref 6–12)
POTASSIUM BLD-SCNC: 3.7 MMOL/L (ref 3.5–5.2)
PROTHROMBIN TIME: 19.5 SECONDS (ref 11.7–14.2)
RBC # BLD AUTO: 3.48 10*6/MM3 (ref 4.6–6)
SODIUM BLD-SCNC: 138 MMOL/L (ref 136–145)
WBC NRBC COR # BLD: 12.92 10*3/MM3 (ref 4.5–10.7)

## 2018-12-15 PROCEDURE — 82962 GLUCOSE BLOOD TEST: CPT

## 2018-12-15 PROCEDURE — 97110 THERAPEUTIC EXERCISES: CPT

## 2018-12-15 PROCEDURE — 85610 PROTHROMBIN TIME: CPT | Performed by: INTERNAL MEDICINE

## 2018-12-15 PROCEDURE — 99231 SBSQ HOSP IP/OBS SF/LOW 25: CPT | Performed by: SURGERY

## 2018-12-15 PROCEDURE — 85025 COMPLETE CBC W/AUTO DIFF WBC: CPT | Performed by: INTERNAL MEDICINE

## 2018-12-15 PROCEDURE — 99232 SBSQ HOSP IP/OBS MODERATE 35: CPT | Performed by: INTERNAL MEDICINE

## 2018-12-15 PROCEDURE — 80048 BASIC METABOLIC PNL TOTAL CA: CPT | Performed by: INTERNAL MEDICINE

## 2018-12-15 RX ADMIN — METOPROLOL TARTRATE 5 MG: 5 INJECTION, SOLUTION INTRAVENOUS at 08:38

## 2018-12-15 RX ADMIN — DEXTROSE MONOHYDRATE 30 ML/HR: 50 INJECTION, SOLUTION INTRAVENOUS at 21:11

## 2018-12-15 RX ADMIN — LIDOCAINE 2 PATCH: 50 PATCH CUTANEOUS at 14:12

## 2018-12-15 RX ADMIN — METOPROLOL TARTRATE 5 MG: 5 INJECTION, SOLUTION INTRAVENOUS at 21:04

## 2018-12-15 RX ADMIN — METOPROLOL TARTRATE 5 MG: 5 INJECTION, SOLUTION INTRAVENOUS at 14:13

## 2018-12-15 RX ADMIN — METOPROLOL TARTRATE 5 MG: 5 INJECTION, SOLUTION INTRAVENOUS at 03:41

## 2018-12-16 LAB
GLUCOSE BLDC GLUCOMTR-MCNC: 101 MG/DL (ref 70–130)
GLUCOSE BLDC GLUCOMTR-MCNC: 106 MG/DL (ref 70–130)
GLUCOSE BLDC GLUCOMTR-MCNC: 114 MG/DL (ref 70–130)
GLUCOSE BLDC GLUCOMTR-MCNC: 115 MG/DL (ref 70–130)
INR PPP: 1.58 (ref 0.9–1.1)
PROTHROMBIN TIME: 18.6 SECONDS (ref 11.7–14.2)

## 2018-12-16 PROCEDURE — 99231 SBSQ HOSP IP/OBS SF/LOW 25: CPT | Performed by: SURGERY

## 2018-12-16 PROCEDURE — 82962 GLUCOSE BLOOD TEST: CPT

## 2018-12-16 PROCEDURE — 97110 THERAPEUTIC EXERCISES: CPT

## 2018-12-16 PROCEDURE — 85610 PROTHROMBIN TIME: CPT | Performed by: INTERNAL MEDICINE

## 2018-12-16 RX ADMIN — METOPROLOL TARTRATE 5 MG: 5 INJECTION, SOLUTION INTRAVENOUS at 02:20

## 2018-12-16 RX ADMIN — METOPROLOL TARTRATE 5 MG: 5 INJECTION, SOLUTION INTRAVENOUS at 08:10

## 2018-12-16 RX ADMIN — LIDOCAINE 2 PATCH: 50 PATCH CUTANEOUS at 14:07

## 2018-12-16 RX ADMIN — METOPROLOL TARTRATE 5 MG: 5 INJECTION, SOLUTION INTRAVENOUS at 20:12

## 2018-12-16 RX ADMIN — METOPROLOL TARTRATE 5 MG: 5 INJECTION, SOLUTION INTRAVENOUS at 14:07

## 2018-12-17 ENCOUNTER — TELEPHONE (OUTPATIENT)
Dept: SPORTS MEDICINE | Facility: CLINIC | Age: 83
End: 2018-12-17

## 2018-12-17 LAB
ANION GAP SERPL CALCULATED.3IONS-SCNC: 22.4 MMOL/L
BASOPHILS # BLD AUTO: 0.02 10*3/MM3 (ref 0–0.2)
BASOPHILS NFR BLD AUTO: 0.2 % (ref 0–1.5)
BUN BLD-MCNC: 74 MG/DL (ref 8–23)
BUN/CREAT SERPL: 9.4 (ref 7–25)
CALCIUM SPEC-SCNC: 8.9 MG/DL (ref 8.6–10.5)
CHLORIDE SERPL-SCNC: 96 MMOL/L (ref 98–107)
CO2 SERPL-SCNC: 21.6 MMOL/L (ref 22–29)
CREAT BLD-MCNC: 7.85 MG/DL (ref 0.76–1.27)
DEPRECATED RDW RBC AUTO: 49.1 FL (ref 37–54)
EOSINOPHIL # BLD AUTO: 0.28 10*3/MM3 (ref 0–0.7)
EOSINOPHIL NFR BLD AUTO: 2.4 % (ref 0.3–6.2)
ERYTHROCYTE [DISTWIDTH] IN BLOOD BY AUTOMATED COUNT: 14.5 % (ref 11.5–14.5)
GFR SERPL CREATININE-BSD FRML MDRD: 7 ML/MIN/1.73
GFR SERPL CREATININE-BSD FRML MDRD: ABNORMAL ML/MIN/1.73
GLUCOSE BLD-MCNC: 94 MG/DL (ref 65–99)
GLUCOSE BLDC GLUCOMTR-MCNC: 102 MG/DL (ref 70–130)
GLUCOSE BLDC GLUCOMTR-MCNC: 105 MG/DL (ref 70–130)
HCT VFR BLD AUTO: 28.7 % (ref 40.4–52.2)
HGB BLD-MCNC: 9.5 G/DL (ref 13.7–17.6)
IMM GRANULOCYTES # BLD: 0.25 10*3/MM3 (ref 0–0.03)
IMM GRANULOCYTES NFR BLD: 2.1 % (ref 0–0.5)
INR PPP: 1.57 (ref 0.9–1.1)
LYMPHOCYTES # BLD AUTO: 0.97 10*3/MM3 (ref 0.9–4.8)
LYMPHOCYTES NFR BLD AUTO: 8.2 % (ref 19.6–45.3)
MCH RBC QN AUTO: 30.5 PG (ref 27–32.7)
MCHC RBC AUTO-ENTMCNC: 33.1 G/DL (ref 32.6–36.4)
MCV RBC AUTO: 92.3 FL (ref 79.8–96.2)
MONOCYTES # BLD AUTO: 1.17 10*3/MM3 (ref 0.2–1.2)
MONOCYTES NFR BLD AUTO: 9.9 % (ref 5–12)
NEUTROPHILS # BLD AUTO: 9.36 10*3/MM3 (ref 1.9–8.1)
NEUTROPHILS NFR BLD AUTO: 79.3 % (ref 42.7–76)
NRBC BLD MANUAL-RTO: 0 /100 WBC (ref 0–0)
PLATELET # BLD AUTO: 229 10*3/MM3 (ref 140–500)
PMV BLD AUTO: 10.1 FL (ref 6–12)
POTASSIUM BLD-SCNC: 3.7 MMOL/L (ref 3.5–5.2)
PROTHROMBIN TIME: 18.5 SECONDS (ref 11.7–14.2)
RBC # BLD AUTO: 3.11 10*6/MM3 (ref 4.6–6)
SODIUM BLD-SCNC: 140 MMOL/L (ref 136–145)
WBC NRBC COR # BLD: 11.8 10*3/MM3 (ref 4.5–10.7)

## 2018-12-17 PROCEDURE — 80048 BASIC METABOLIC PNL TOTAL CA: CPT | Performed by: INTERNAL MEDICINE

## 2018-12-17 PROCEDURE — 25010000002 ONDANSETRON PER 1 MG: Performed by: HOSPITALIST

## 2018-12-17 PROCEDURE — 5A1D70Z PERFORMANCE OF URINARY FILTRATION, INTERMITTENT, LESS THAN 6 HOURS PER DAY: ICD-10-PCS | Performed by: INTERNAL MEDICINE

## 2018-12-17 PROCEDURE — 85610 PROTHROMBIN TIME: CPT | Performed by: INTERNAL MEDICINE

## 2018-12-17 PROCEDURE — 99231 SBSQ HOSP IP/OBS SF/LOW 25: CPT | Performed by: SURGERY

## 2018-12-17 PROCEDURE — 99232 SBSQ HOSP IP/OBS MODERATE 35: CPT | Performed by: NURSE PRACTITIONER

## 2018-12-17 PROCEDURE — 82962 GLUCOSE BLOOD TEST: CPT

## 2018-12-17 PROCEDURE — 85025 COMPLETE CBC W/AUTO DIFF WBC: CPT | Performed by: INTERNAL MEDICINE

## 2018-12-17 RX ORDER — WARFARIN SODIUM 1 MG/1
1 TABLET ORAL
Status: COMPLETED | OUTPATIENT
Start: 2018-12-17 | End: 2018-12-17

## 2018-12-17 RX ADMIN — ONDANSETRON 4 MG: 2 INJECTION INTRAMUSCULAR; INTRAVENOUS at 20:33

## 2018-12-17 RX ADMIN — METOPROLOL TARTRATE 5 MG: 5 INJECTION, SOLUTION INTRAVENOUS at 15:25

## 2018-12-17 RX ADMIN — GUAIFENESIN 600 MG: 600 TABLET, EXTENDED RELEASE ORAL at 20:33

## 2018-12-17 RX ADMIN — LIDOCAINE 2 PATCH: 50 PATCH CUTANEOUS at 14:12

## 2018-12-17 RX ADMIN — METOPROLOL TARTRATE 5 MG: 5 INJECTION, SOLUTION INTRAVENOUS at 21:43

## 2018-12-17 RX ADMIN — GUAIFENESIN 600 MG: 600 TABLET, EXTENDED RELEASE ORAL at 08:15

## 2018-12-17 RX ADMIN — Medication 1 TABLET: at 15:26

## 2018-12-17 RX ADMIN — METOPROLOL TARTRATE 5 MG: 5 INJECTION, SOLUTION INTRAVENOUS at 05:31

## 2018-12-17 RX ADMIN — WARFARIN SODIUM 1 MG: 1 TABLET ORAL at 18:06

## 2018-12-17 RX ADMIN — FINASTERIDE 5 MG: 5 TABLET, FILM COATED ORAL at 14:14

## 2018-12-17 NOTE — TELEPHONE ENCOUNTER
Eryn from [facility] stating that patient's INR was tested on 12/16/18 and was 1.5.

## 2018-12-18 LAB
ALBUMIN SERPL-MCNC: 3.2 G/DL (ref 3.5–5.2)
ANION GAP SERPL CALCULATED.3IONS-SCNC: 18.7 MMOL/L
BUN BLD-MCNC: 36 MG/DL (ref 8–23)
BUN/CREAT SERPL: 7 (ref 7–25)
CALCIUM SPEC-SCNC: 9 MG/DL (ref 8.6–10.5)
CHLORIDE SERPL-SCNC: 100 MMOL/L (ref 98–107)
CO2 SERPL-SCNC: 23.3 MMOL/L (ref 22–29)
CREAT BLD-MCNC: 5.12 MG/DL (ref 0.76–1.27)
GFR SERPL CREATININE-BSD FRML MDRD: 11 ML/MIN/1.73
GFR SERPL CREATININE-BSD FRML MDRD: ABNORMAL ML/MIN/1.73
GLUCOSE BLD-MCNC: 103 MG/DL (ref 65–99)
INR PPP: 1.42 (ref 0.9–1.1)
PHOSPHATE SERPL-MCNC: 4.7 MG/DL (ref 2.5–4.5)
POTASSIUM BLD-SCNC: 4.2 MMOL/L (ref 3.5–5.2)
PROTHROMBIN TIME: 17.1 SECONDS (ref 11.7–14.2)
SODIUM BLD-SCNC: 142 MMOL/L (ref 136–145)

## 2018-12-18 PROCEDURE — 80069 RENAL FUNCTION PANEL: CPT | Performed by: INTERNAL MEDICINE

## 2018-12-18 PROCEDURE — 99231 SBSQ HOSP IP/OBS SF/LOW 25: CPT | Performed by: SURGERY

## 2018-12-18 PROCEDURE — 85610 PROTHROMBIN TIME: CPT | Performed by: INTERNAL MEDICINE

## 2018-12-18 PROCEDURE — 99232 SBSQ HOSP IP/OBS MODERATE 35: CPT | Performed by: NURSE PRACTITIONER

## 2018-12-18 PROCEDURE — 97110 THERAPEUTIC EXERCISES: CPT

## 2018-12-18 RX ORDER — SEVELAMER CARBONATE 800 MG/1
800 TABLET, FILM COATED ORAL
Status: DISCONTINUED | OUTPATIENT
Start: 2018-12-18 | End: 2018-12-19 | Stop reason: HOSPADM

## 2018-12-18 RX ORDER — WARFARIN SODIUM 2 MG/1
2 TABLET ORAL
Status: DISCONTINUED | OUTPATIENT
Start: 2018-12-18 | End: 2018-12-18

## 2018-12-18 RX ORDER — WARFARIN SODIUM 2.5 MG/1
2.5 TABLET ORAL EVERY OTHER DAY
Status: DISCONTINUED | OUTPATIENT
Start: 2018-12-18 | End: 2018-12-18

## 2018-12-18 RX ORDER — WARFARIN SODIUM 5 MG/1
5 TABLET ORAL EVERY OTHER DAY
Status: DISCONTINUED | OUTPATIENT
Start: 2018-12-18 | End: 2018-12-19 | Stop reason: HOSPADM

## 2018-12-18 RX ORDER — DILTIAZEM HYDROCHLORIDE 120 MG/1
120 CAPSULE, COATED, EXTENDED RELEASE ORAL
Status: DISCONTINUED | OUTPATIENT
Start: 2018-12-18 | End: 2018-12-19 | Stop reason: HOSPADM

## 2018-12-18 RX ORDER — WARFARIN SODIUM 2.5 MG/1
2.5 TABLET ORAL EVERY OTHER DAY
Status: DISCONTINUED | OUTPATIENT
Start: 2018-12-19 | End: 2018-12-19

## 2018-12-18 RX ADMIN — Medication 1 TABLET: at 08:09

## 2018-12-18 RX ADMIN — FINASTERIDE 5 MG: 5 TABLET, FILM COATED ORAL at 08:10

## 2018-12-18 RX ADMIN — LIDOCAINE 2 PATCH: 50 PATCH CUTANEOUS at 11:57

## 2018-12-18 RX ADMIN — GUAIFENESIN 600 MG: 600 TABLET, EXTENDED RELEASE ORAL at 08:09

## 2018-12-18 RX ADMIN — WARFARIN SODIUM 5 MG: 5 TABLET ORAL at 16:50

## 2018-12-18 RX ADMIN — METOPROLOL TARTRATE 5 MG: 5 INJECTION, SOLUTION INTRAVENOUS at 08:10

## 2018-12-18 RX ADMIN — SEVELAMER CARBONATE 800 MG: 800 TABLET, FILM COATED ORAL at 21:04

## 2018-12-18 RX ADMIN — DILTIAZEM HYDROCHLORIDE 120 MG: 120 CAPSULE, COATED, EXTENDED RELEASE ORAL at 09:29

## 2018-12-18 RX ADMIN — GUAIFENESIN 600 MG: 600 TABLET, EXTENDED RELEASE ORAL at 21:04

## 2018-12-19 VITALS
SYSTOLIC BLOOD PRESSURE: 109 MMHG | HEART RATE: 101 BPM | DIASTOLIC BLOOD PRESSURE: 65 MMHG | BODY MASS INDEX: 22.5 KG/M2 | TEMPERATURE: 98 F | OXYGEN SATURATION: 91 % | RESPIRATION RATE: 16 BRPM | HEIGHT: 73 IN | WEIGHT: 169.75 LBS

## 2018-12-19 PROBLEM — G93.41 METABOLIC ENCEPHALOPATHY: Status: RESOLVED | Noted: 2018-12-12 | Resolved: 2018-12-19

## 2018-12-19 PROBLEM — K56.609 SMALL BOWEL OBSTRUCTION (HCC): Status: RESOLVED | Noted: 2018-12-09 | Resolved: 2018-12-19

## 2018-12-19 LAB
ALBUMIN SERPL-MCNC: 2.8 G/DL (ref 3.5–5.2)
ANION GAP SERPL CALCULATED.3IONS-SCNC: 14 MMOL/L
BUN BLD-MCNC: 50 MG/DL (ref 8–23)
BUN/CREAT SERPL: 7.5 (ref 7–25)
CALCIUM SPEC-SCNC: 8.6 MG/DL (ref 8.6–10.5)
CHLORIDE SERPL-SCNC: 100 MMOL/L (ref 98–107)
CO2 SERPL-SCNC: 22 MMOL/L (ref 22–29)
CREAT BLD-MCNC: 6.71 MG/DL (ref 0.76–1.27)
GFR SERPL CREATININE-BSD FRML MDRD: 8 ML/MIN/1.73
GFR SERPL CREATININE-BSD FRML MDRD: ABNORMAL ML/MIN/1.73
GLUCOSE BLD-MCNC: 99 MG/DL (ref 65–99)
INR PPP: 1.41 (ref 0.9–1.1)
PHOSPHATE SERPL-MCNC: 5 MG/DL (ref 2.5–4.5)
POTASSIUM BLD-SCNC: 3.9 MMOL/L (ref 3.5–5.2)
PROTHROMBIN TIME: 17 SECONDS (ref 11.7–14.2)
SODIUM BLD-SCNC: 136 MMOL/L (ref 136–145)

## 2018-12-19 PROCEDURE — 80069 RENAL FUNCTION PANEL: CPT | Performed by: INTERNAL MEDICINE

## 2018-12-19 PROCEDURE — 5A1D70Z PERFORMANCE OF URINARY FILTRATION, INTERMITTENT, LESS THAN 6 HOURS PER DAY: ICD-10-PCS | Performed by: INTERNAL MEDICINE

## 2018-12-19 PROCEDURE — 63410000001 EPOETIN ALFA PER 1000 UNITS: Performed by: INTERNAL MEDICINE

## 2018-12-19 PROCEDURE — 99232 SBSQ HOSP IP/OBS MODERATE 35: CPT | Performed by: INTERNAL MEDICINE

## 2018-12-19 PROCEDURE — 85610 PROTHROMBIN TIME: CPT | Performed by: INTERNAL MEDICINE

## 2018-12-19 RX ORDER — WARFARIN SODIUM 7.5 MG/1
7.5 TABLET ORAL
Status: COMPLETED | OUTPATIENT
Start: 2018-12-19 | End: 2018-12-19

## 2018-12-19 RX ORDER — FINASTERIDE 5 MG/1
5 TABLET, FILM COATED ORAL DAILY
Qty: 30 TABLET | Refills: 0 | Status: SHIPPED | OUTPATIENT
Start: 2018-12-19 | End: 2019-05-09 | Stop reason: SDUPTHER

## 2018-12-19 RX ADMIN — Medication 1 TABLET: at 14:46

## 2018-12-19 RX ADMIN — LIDOCAINE 2 PATCH: 50 PATCH CUTANEOUS at 14:48

## 2018-12-19 RX ADMIN — ERYTHROPOIETIN 5000 UNITS: 3000 INJECTION, SOLUTION INTRAVENOUS; SUBCUTANEOUS at 10:55

## 2018-12-19 RX ADMIN — FINASTERIDE 5 MG: 5 TABLET, FILM COATED ORAL at 14:45

## 2018-12-19 RX ADMIN — SEVELAMER CARBONATE 800 MG: 800 TABLET, FILM COATED ORAL at 14:46

## 2018-12-19 RX ADMIN — DILTIAZEM HYDROCHLORIDE 120 MG: 120 CAPSULE, COATED, EXTENDED RELEASE ORAL at 14:46

## 2018-12-19 RX ADMIN — GUAIFENESIN 600 MG: 600 TABLET, EXTENDED RELEASE ORAL at 14:46

## 2018-12-19 RX ADMIN — WARFARIN SODIUM 7.5 MG: 7.5 TABLET ORAL at 15:17

## 2018-12-20 ENCOUNTER — READMISSION MANAGEMENT (OUTPATIENT)
Dept: CALL CENTER | Facility: HOSPITAL | Age: 83
End: 2018-12-20

## 2018-12-20 NOTE — OUTREACH NOTE
Prep Survey      Responses   Facility patient discharged from?  [place]   Is patient eligible?  Yes   Discharge diagnosis  **Small bowel obstruction Dialysis   Does the patient have one of the following disease processes/diagnoses(primary or secondary)?  Other   Does the patient have Home health ordered?  Yes   What is the Home health agency?   paid caregivers and [facility]   Is there a DME ordered?  No   Prep survey completed?  Yes          Itzel Ching RN

## 2018-12-21 ENCOUNTER — APPOINTMENT (OUTPATIENT)
Dept: GENERAL RADIOLOGY | Facility: HOSPITAL | Age: 83
End: 2018-12-21

## 2018-12-21 ENCOUNTER — HOSPITAL ENCOUNTER (INPATIENT)
Facility: HOSPITAL | Age: 83
LOS: 2 days | Discharge: HOME HEALTH | End: 2018-12-24
Attending: EMERGENCY MEDICINE | Admitting: HOSPITALIST

## 2018-12-21 ENCOUNTER — DOCUMENTATION (OUTPATIENT)
Dept: SPORTS MEDICINE | Facility: CLINIC | Age: 83
End: 2018-12-21

## 2018-12-21 DIAGNOSIS — K85.10 ACUTE BILIARY PANCREATITIS WITHOUT INFECTION OR NECROSIS: Primary | ICD-10-CM

## 2018-12-21 DIAGNOSIS — K80.20 GALL STONES: ICD-10-CM

## 2018-12-21 PROCEDURE — 74018 RADEX ABDOMEN 1 VIEW: CPT

## 2018-12-21 PROCEDURE — 99285 EMERGENCY DEPT VISIT HI MDM: CPT

## 2018-12-21 RX ORDER — MORPHINE SULFATE 2 MG/ML
4 INJECTION, SOLUTION INTRAMUSCULAR; INTRAVENOUS ONCE
Status: COMPLETED | OUTPATIENT
Start: 2018-12-21 | End: 2018-12-22

## 2018-12-21 RX ORDER — SODIUM CHLORIDE 0.9 % (FLUSH) 0.9 %
10 SYRINGE (ML) INJECTION AS NEEDED
Status: DISCONTINUED | OUTPATIENT
Start: 2018-12-21 | End: 2018-12-24 | Stop reason: HOSPADM

## 2018-12-21 RX ORDER — ONDANSETRON 2 MG/ML
4 INJECTION INTRAMUSCULAR; INTRAVENOUS ONCE
Status: COMPLETED | OUTPATIENT
Start: 2018-12-21 | End: 2018-12-22

## 2018-12-21 RX ORDER — ONDANSETRON 4 MG/1
4 TABLET, ORALLY DISINTEGRATING ORAL EVERY 8 HOURS PRN
Qty: 30 TABLET | Refills: 0 | Status: ON HOLD | OUTPATIENT
Start: 2018-12-21 | End: 2022-01-01

## 2018-12-22 ENCOUNTER — READMISSION MANAGEMENT (OUTPATIENT)
Dept: CALL CENTER | Facility: HOSPITAL | Age: 83
End: 2018-12-22

## 2018-12-22 ENCOUNTER — APPOINTMENT (OUTPATIENT)
Dept: ULTRASOUND IMAGING | Facility: HOSPITAL | Age: 83
End: 2018-12-22

## 2018-12-22 ENCOUNTER — APPOINTMENT (OUTPATIENT)
Dept: CT IMAGING | Facility: HOSPITAL | Age: 83
End: 2018-12-22

## 2018-12-22 PROBLEM — K85.10 ACUTE BILIARY PANCREATITIS WITHOUT INFECTION OR NECROSIS: Status: ACTIVE | Noted: 2018-12-22

## 2018-12-22 LAB
ALBUMIN SERPL-MCNC: 3.4 G/DL (ref 3.5–5.2)
ALBUMIN/GLOB SERPL: 1.1 G/DL
ALP SERPL-CCNC: 111 U/L (ref 39–117)
ALT SERPL W P-5'-P-CCNC: 18 U/L (ref 1–41)
ANION GAP SERPL CALCULATED.3IONS-SCNC: 16.1 MMOL/L
AST SERPL-CCNC: 19 U/L (ref 1–40)
BASOPHILS # BLD AUTO: 0.01 10*3/MM3 (ref 0–0.2)
BASOPHILS NFR BLD AUTO: 0.1 % (ref 0–1.5)
BILIRUB SERPL-MCNC: 0.7 MG/DL (ref 0.1–1.2)
BUN BLD-MCNC: 17 MG/DL (ref 8–23)
BUN/CREAT SERPL: 4.9 (ref 7–25)
CALCIUM SPEC-SCNC: 9.6 MG/DL (ref 8.6–10.5)
CHLORIDE SERPL-SCNC: 98 MMOL/L (ref 98–107)
CO2 SERPL-SCNC: 23.9 MMOL/L (ref 22–29)
CREAT BLD-MCNC: 3.49 MG/DL (ref 0.76–1.27)
D-LACTATE SERPL-SCNC: 1.5 MMOL/L (ref 0.5–2)
DEPRECATED RDW RBC AUTO: 52.1 FL (ref 37–54)
EOSINOPHIL # BLD AUTO: 0.06 10*3/MM3 (ref 0–0.7)
EOSINOPHIL NFR BLD AUTO: 0.4 % (ref 0.3–6.2)
ERYTHROCYTE [DISTWIDTH] IN BLOOD BY AUTOMATED COUNT: 14.5 % (ref 11.5–14.5)
GFR SERPL CREATININE-BSD FRML MDRD: 17 ML/MIN/1.73
GLOBULIN UR ELPH-MCNC: 3.1 GM/DL
GLUCOSE BLD-MCNC: 129 MG/DL (ref 65–99)
HCT VFR BLD AUTO: 31 % (ref 40.4–52.2)
HGB BLD-MCNC: 9.8 G/DL (ref 13.7–17.6)
IMM GRANULOCYTES # BLD AUTO: 0.04 10*3/MM3 (ref 0–0.03)
IMM GRANULOCYTES NFR BLD AUTO: 0.3 % (ref 0–0.5)
INR PPP: 2.29 (ref 0.9–1.1)
LIPASE SERPL-CCNC: 127 U/L (ref 13–60)
LYMPHOCYTES # BLD AUTO: 0.49 10*3/MM3 (ref 0.9–4.8)
LYMPHOCYTES NFR BLD AUTO: 3.1 % (ref 19.6–45.3)
MCH RBC QN AUTO: 31 PG (ref 27–32.7)
MCHC RBC AUTO-ENTMCNC: 31.6 G/DL (ref 32.6–36.4)
MCV RBC AUTO: 98.1 FL (ref 79.8–96.2)
MONOCYTES # BLD AUTO: 1.01 10*3/MM3 (ref 0.2–1.2)
MONOCYTES NFR BLD AUTO: 6.4 % (ref 5–12)
NEUTROPHILS # BLD AUTO: 14.28 10*3/MM3 (ref 1.9–8.1)
NEUTROPHILS NFR BLD AUTO: 89.7 % (ref 42.7–76)
PLATELET # BLD AUTO: 245 10*3/MM3 (ref 140–500)
PMV BLD AUTO: 9.9 FL (ref 6–12)
POTASSIUM BLD-SCNC: 4.1 MMOL/L (ref 3.5–5.2)
PROT SERPL-MCNC: 6.5 G/DL (ref 6–8.5)
PROTHROMBIN TIME: 24.9 SECONDS (ref 11.7–14.2)
RBC # BLD AUTO: 3.16 10*6/MM3 (ref 4.6–6)
SODIUM BLD-SCNC: 138 MMOL/L (ref 136–145)
WBC NRBC COR # BLD: 15.89 10*3/MM3 (ref 4.5–10.7)

## 2018-12-22 PROCEDURE — 25010000002 ONDANSETRON PER 1 MG: Performed by: EMERGENCY MEDICINE

## 2018-12-22 PROCEDURE — 85610 PROTHROMBIN TIME: CPT | Performed by: EMERGENCY MEDICINE

## 2018-12-22 PROCEDURE — 25010000002 MORPHINE PER 10 MG: Performed by: EMERGENCY MEDICINE

## 2018-12-22 PROCEDURE — 76705 ECHO EXAM OF ABDOMEN: CPT

## 2018-12-22 PROCEDURE — 83690 ASSAY OF LIPASE: CPT | Performed by: EMERGENCY MEDICINE

## 2018-12-22 PROCEDURE — 80053 COMPREHEN METABOLIC PANEL: CPT | Performed by: EMERGENCY MEDICINE

## 2018-12-22 PROCEDURE — 99221 1ST HOSP IP/OBS SF/LOW 40: CPT | Performed by: SURGERY

## 2018-12-22 PROCEDURE — 87040 BLOOD CULTURE FOR BACTERIA: CPT | Performed by: HOSPITALIST

## 2018-12-22 PROCEDURE — 85025 COMPLETE CBC W/AUTO DIFF WBC: CPT | Performed by: EMERGENCY MEDICINE

## 2018-12-22 PROCEDURE — 74176 CT ABD & PELVIS W/O CONTRAST: CPT

## 2018-12-22 PROCEDURE — 83605 ASSAY OF LACTIC ACID: CPT | Performed by: HOSPITALIST

## 2018-12-22 RX ORDER — ONDANSETRON 4 MG/1
4 TABLET, FILM COATED ORAL EVERY 6 HOURS PRN
Status: DISCONTINUED | OUTPATIENT
Start: 2018-12-22 | End: 2018-12-24 | Stop reason: HOSPADM

## 2018-12-22 RX ORDER — NITROGLYCERIN 0.4 MG/1
0.4 TABLET SUBLINGUAL
Status: DISCONTINUED | OUTPATIENT
Start: 2018-12-22 | End: 2018-12-24 | Stop reason: HOSPADM

## 2018-12-22 RX ORDER — SODIUM CHLORIDE 9 MG/ML
100 INJECTION, SOLUTION INTRAVENOUS CONTINUOUS
Status: DISCONTINUED | OUTPATIENT
Start: 2018-12-22 | End: 2018-12-22

## 2018-12-22 RX ORDER — HYDROCODONE BITARTRATE AND ACETAMINOPHEN 5; 325 MG/1; MG/1
1 TABLET ORAL EVERY 6 HOURS PRN
Status: DISCONTINUED | OUTPATIENT
Start: 2018-12-22 | End: 2018-12-24 | Stop reason: HOSPADM

## 2018-12-22 RX ORDER — FAMOTIDINE 40 MG/1
40 TABLET, FILM COATED ORAL DAILY
Status: DISCONTINUED | OUTPATIENT
Start: 2018-12-22 | End: 2018-12-24

## 2018-12-22 RX ORDER — SODIUM CHLORIDE 0.9 % (FLUSH) 0.9 %
3 SYRINGE (ML) INJECTION EVERY 12 HOURS SCHEDULED
Status: DISCONTINUED | OUTPATIENT
Start: 2018-12-22 | End: 2018-12-24 | Stop reason: HOSPADM

## 2018-12-22 RX ORDER — SODIUM CHLORIDE 9 MG/ML
75 INJECTION, SOLUTION INTRAVENOUS CONTINUOUS
Status: DISCONTINUED | OUTPATIENT
Start: 2018-12-22 | End: 2018-12-23

## 2018-12-22 RX ORDER — SEVELAMER CARBONATE 800 MG/1
800 TABLET, FILM COATED ORAL
Status: DISCONTINUED | OUTPATIENT
Start: 2018-12-22 | End: 2018-12-23

## 2018-12-22 RX ORDER — LIDOCAINE 50 MG/G
2 PATCH TOPICAL EVERY 24 HOURS
Status: DISCONTINUED | OUTPATIENT
Start: 2018-12-22 | End: 2018-12-24 | Stop reason: HOSPADM

## 2018-12-22 RX ORDER — ACETAMINOPHEN 325 MG/1
650 TABLET ORAL EVERY 4 HOURS PRN
Status: DISCONTINUED | OUTPATIENT
Start: 2018-12-22 | End: 2018-12-24 | Stop reason: HOSPADM

## 2018-12-22 RX ORDER — ONDANSETRON 4 MG/1
4 TABLET, ORALLY DISINTEGRATING ORAL EVERY 6 HOURS PRN
Status: DISCONTINUED | OUTPATIENT
Start: 2018-12-22 | End: 2018-12-24 | Stop reason: HOSPADM

## 2018-12-22 RX ORDER — ONDANSETRON 2 MG/ML
4 INJECTION INTRAMUSCULAR; INTRAVENOUS EVERY 6 HOURS PRN
Status: DISCONTINUED | OUTPATIENT
Start: 2018-12-22 | End: 2018-12-24 | Stop reason: HOSPADM

## 2018-12-22 RX ORDER — DILTIAZEM HYDROCHLORIDE 120 MG/1
120 CAPSULE, COATED, EXTENDED RELEASE ORAL
Status: DISCONTINUED | OUTPATIENT
Start: 2018-12-22 | End: 2018-12-22

## 2018-12-22 RX ORDER — FINASTERIDE 5 MG/1
5 TABLET, FILM COATED ORAL DAILY
Status: DISCONTINUED | OUTPATIENT
Start: 2018-12-22 | End: 2018-12-24 | Stop reason: HOSPADM

## 2018-12-22 RX ORDER — ONDANSETRON 4 MG/1
4 TABLET, ORALLY DISINTEGRATING ORAL EVERY 8 HOURS PRN
Status: DISCONTINUED | OUTPATIENT
Start: 2018-12-22 | End: 2018-12-24 | Stop reason: SDUPTHER

## 2018-12-22 RX ORDER — SODIUM CHLORIDE 0.9 % (FLUSH) 0.9 %
1-10 SYRINGE (ML) INJECTION AS NEEDED
Status: DISCONTINUED | OUTPATIENT
Start: 2018-12-22 | End: 2018-12-24 | Stop reason: HOSPADM

## 2018-12-22 RX ADMIN — FAMOTIDINE 40 MG: 40 TABLET, FILM COATED ORAL at 11:30

## 2018-12-22 RX ADMIN — Medication 4 MG: at 00:01

## 2018-12-22 RX ADMIN — FINASTERIDE 5 MG: 5 TABLET, FILM COATED ORAL at 11:30

## 2018-12-22 RX ADMIN — ONDANSETRON 4 MG: 2 INJECTION INTRAMUSCULAR; INTRAVENOUS at 00:01

## 2018-12-22 RX ADMIN — SEVELAMER CARBONATE 800 MG: 800 TABLET, FILM COATED ORAL at 17:42

## 2018-12-22 RX ADMIN — SODIUM CHLORIDE, PRESERVATIVE FREE 3 ML: 5 INJECTION INTRAVENOUS at 09:00

## 2018-12-22 RX ADMIN — SODIUM CHLORIDE 1000 ML: 9 INJECTION, SOLUTION INTRAVENOUS at 07:43

## 2018-12-22 RX ADMIN — SODIUM CHLORIDE 75 ML/HR: 9 INJECTION, SOLUTION INTRAVENOUS at 11:30

## 2018-12-22 RX ADMIN — SEVELAMER CARBONATE 800 MG: 800 TABLET, FILM COATED ORAL at 11:45

## 2018-12-22 RX ADMIN — SODIUM CHLORIDE, PRESERVATIVE FREE 3 ML: 5 INJECTION INTRAVENOUS at 21:00

## 2018-12-22 RX ADMIN — SODIUM CHLORIDE 100 ML/HR: 9 INJECTION, SOLUTION INTRAVENOUS at 07:13

## 2018-12-22 NOTE — CONSULTS
Nephrology Initial Hospital Note      Patient: Shaun Brewster  Date of Service: 2018  4:01 PM  : 1930  MRN: [id number]  ATTENDING: Kris Davila MD  PRIMARY: Andrew Trujillo MD  _______________________________________________    Chief Complaint: Abdominal pain  Requested to see for: Elevated Creatinine  HPI:  Had abrupt onset of severe abdominal pain during dialysis yesterday, associated with nausea. Better today and has taken some PO. Denies rececent similar pain, but poor historian denying other positive aspects of past history and corrected by son. Hospitalized last week with [facility]BO.     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         Past Medical History:   Diagnosis Date   • Anemia    • 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CMS/HCC)    • Arthritis    • Atypical chest pain    • Blind right eye    • Chronic kidney disease (CKD), stage V (CMS/HCC)    • Dizziness    • Dyslipidemia    • Esophageal reflux    • Esophagitis, reflux    • Femur fracture, right (CMS/HCC)    • Hypertension    • Macular degeneration    • Malignant neoplasm of prostate (CMS/HCC)     gland   • Nephrolithiasis    • Nonspecific abnormal finding    • Paroxysmal atrial fibrillation (CMS/HCC)    • Postnasal drip    • Premature ventricular contractions    • Renal disease    • Throat pain    • Urge incontinence of urine    • Ventricular tachycardia (CMS/HCC)        Review of Systems:  ROS included denial of cough, dyspnea, angina, and GI blood loss (though he denies issues of HPI, including nausea, that are corrected by son). Other than as noted in HPI and above, all other systems are negative.        Family History   Problem Relation Age of Onset   • Other  Brother         acute bacterial endocarditis         3 brothers with ESRD who required dialysis; none with renal biopsy to his knowledge        Social History     Socioeconomic History   • Marital status:      Spouse name: Not on file   • Number of children: Not on file   • Years of education: Not on file   • Highest education level: Not on file   Social Needs   • Financial resource strain: Not on file   • Food insecurity - worry: Not on file   • Food insecurity - inability: Not on file   • Transportation needs - medical: Not on file   • Transportation needs - non-medical: Not on file   Occupational History   • Not on file   Tobacco Use   • Smoking status: Former Smoker   • Smokeless tobacco: Never Used   Substance and Sexual Activity   • Alcohol use: No   • Drug use: No   • Sexual activity: Defer   Other Topics Concern   • Not on file   Social History Narrative   • Not on file       Medications Prior to Admission   Medication Sig Dispense Refill Last Dose   • Calcium Acetate 668 (169 Ca) MG tablet Take 2 Units by mouth 3 (Three) Times a Day With Meals. 180 tablet 11    • DILT- MG 24 hr capsule TAKE 1 CAPSULE EVERY NIGHT 90 capsule 3    • famotidine (PEPCID) 40 MG tablet TAKE 1 TABLET DAILY 90 tablet 3 Taking   • finasteride (PROSCAR) 5 MG tablet Take 1 tablet by mouth Daily. 30 tablet 0    • HYDROcodone-acetaminophen (NORCO) 5-325 MG per tablet Take 1 tablet by mouth Every 6 (Six) Hours As Needed for Moderate Pain . 15 tablet 0    • lidocaine (LIDODERM) 5 % Place 2 patches on the skin as directed by provider Daily. Remove & Discard patch within 12 hours or as directed by MD 15 each 0    • ondansetron ODT (ZOFRAN-ODT) 4 MG disintegrating tablet Take 1 tablet by mouth Every 8 (Eight) Hours As Needed for Nausea or Vomiting. 30 tablet 0    • sevelamer (RENVELA) 800 MG tablet Take 800 mg by mouth. 2 tablts 4 times daily   Taking   • triphrocaps (NEPHROCAPS) 1 MG capsule capsule Take 1 capsule by mouth.    Taking   • warfarin (COUMADIN) 2.5 MG tablet Take 2.5 mg by mouth every other day.   Taking   • warfarin (JANTOVEN) 5 MG tablet TAKE 1 TABLET DAILY OR AS  DIRECTED 90 tablet 3        Scheduled Meds:  famotidine 40 mg Oral Daily   finasteride 5 mg Oral Daily   lidocaine 2 patch Transdermal Q24H   sevelamer 800 mg Oral TID With Meals   sodium chloride 3 mL Intravenous Q12H     Continuous Infusions:  Sodium chloride 75 mL/hr Last Rate: 75 mL/hr (12/22/18 1130)       Objective  Temp:  [97.4 °F (36.3 °C)-98.6 °F (37 °C)] 98.6 °F (37 °C)  Heart Rate:  [] 94  Resp:  [16-20] 20  BP: ()/() 123/75    Exam:  General: Awake, cooperative, no distress  Eyes: Pink conjunctivae  ENMT: Dry oral mucous membranes  Cardiovascular: Irregular. No pericardial rub. No increased pitting edema. Patent left arm access.  Respiratory: Clear with moderate air movement and no accessory muscle use;  respirations unlabored.  GI: Soft, non-tender with no apparent masses  : No costovertebral tenderness or evident bladder distension.  Musculoskeletal: No unusual muscle tenderness  Skin: Warm and dry with normal turgor  Neuro: Fluent  Psych: Flat affect  Lymphatics: No cervical adenopathy      LAB:  Results from last 7 days   Lab Units  12/22/18   0002  12/19/18   0631  12/18/18   0612   SODIUM mmol/L  138  136  142   POTASSIUM mmol/L  4.1  3.9  4.2   CHLORIDE mmol/L  98  100  100   CO2 mmol/L  23.9  22.0  23.3   BUN mg/dL  17  50*  36*   CREATININE mg/dL  3.49*  6.71*  5.12*   GLUCOSE mg/dL  129*  99  103*   CALCIUM mg/dL  9.6  8.6  9.0   PHOSPHORUS mg/dL   --   5.0*  4.7*   ALBUMIN g/dL  3.40*  2.80*  3.20*       Results from last 7 days   Lab Units  12/22/18   0002  12/17/18   0514   WBC 10*3/mm3  15.89*  11.80*   HEMOGLOBIN g/dL  9.8*  9.5*   PLATELETS 10*3/mm3  245  229       ASSESSMENT:  Patient Active Problem List   Diagnosis   • Ventricular tachycardia (CMS/HCC)   • Chronic atrial fibrillation (CMS/HCC)   • Hypertension   •  ESRD (end stage renal disease) (CMS/HCC)   • Closed fracture of multiple ribs of right side   • Pleural effusion on right   • Kidney cysts   • Liver cyst   • Acute biliary pancreatitis without infection or necrosis       PLAN:  ESRD: Will plan HD tomorrow, then Wednesday unless issues require adjustment on daily reevaluation.   Anemia/CKD: Rx MINESH. Check hematinics.  Abdominal Pain: reason for admission  Hyperphophatemia: Rx diet/binders  AFIB:    Thank you.    _______________________________  Raghav Galvin MD  [facility]  [phone]

## 2018-12-22 NOTE — PLAN OF CARE
Problem: Patient Care Overview  Goal: Plan of Care Review  Outcome: Ongoing (interventions implemented as appropriate)   12/22/18 1326   Coping/Psychosocial   Plan of Care Reviewed With patient;son   Plan of Care Review   Progress no change   OTHER   Outcome Summary Arrived from ER this morning. AAO x all. Atrial fib on monitor--chronic. Denies abd pain and nausea at present. VSS. MAEW but weak. Oriented to new surroundings. Will continue to monitor.       Problem: Fall Risk (Adult)  Goal: Identify Related Risk Factors and Signs and Symptoms  Outcome: Ongoing (interventions implemented as appropriate)      Problem: Pancreatitis, Acute/Chronic (Adult)  Goal: Signs and Symptoms of Listed Potential Problems Will be Absent, Minimized or Managed (Pancreatitis, Acute/Chronic)  Outcome: Ongoing (interventions implemented as appropriate)

## 2018-12-22 NOTE — ED NOTES
Attempted to call report x 1, nurse moving pt to ICU at the moment and unavailable to take report at this time.     Patricia Leung, RN  12/22/18 9618

## 2018-12-22 NOTE — ED NOTES
Pt reports generalized abd pain, n/v x1 day. Pt recently d/c for bowel obstruction.      Swallows, Bridgett Lester RN  12/21/18 5761

## 2018-12-22 NOTE — CONSULTS
Cc: Nausea, abdominal pain    History of presenting illness:   This is a nice, 88-year-old gentleman with multiple medical problems who was just discharged from the hospital about 4 days ago with what looked like a partial bowel obstruction.  He got better with conservative management and went home for a few days and did pretty well, but yesterday began having increasing diffuse abdominal discomfort and nausea.  No vomiting.  He had a bowel movement yesterday and has been passing gas overnight and this morning feels pretty well.  He's not able to specifically locate any particular area that hurts at this time, but he did point to his right groin at one point.  He does endorse some abdominal distention as well.  Currently this has resolved.  CT in the emergency room showed some mild dilatation of the small bowel loops and gallstones in a noninflamed gallbladder.    Past Medical History: End-stage renal disease on dialysis, kidney stones, atrial fibrillation, hypertension    Past Surgical History: Reported prior inguinal hernia surgery, arteriovenous fistula for dialysis, shoulder surgery, knee surgery, no history known of any intra-abdominal surgery    Medications: Reviewed and reconciled in Epic    Allergies: Nitrofurantoin, amiodarone    Social History: Former smoker who quit greater than 10 years ago, does not use alcohol, lives in the independently.    Family History: Negative for colorectal cancer    Review of Systems:  Constitutional: Negative for known weight loss, positive for decreased appetite, negative for fever  Neck: no swollen glands or dysphagia or odynophagia  Respiratory: negative for SOB, cough, hemoptysis or wheezing  Cardiovascular: negative for chest pain, palpitations or peripheral edema  Gastrointestinal: Positive for nausea, bloating, abdominal pain      Physical Exam:   body mass index 21.4  General: alert and oriented, appropriate, no acute distress, chronic ill appearance  Neck: Supple  without lymphadenopathy or thyromegaly, trachea is in the midline  Respiratory: Lungs are clear bilaterally without wheezing, no use of accessory muscles is noted  Cardiovascular: Regular rate and rhythm without murmur, no peripheral edema  Gastrointestinal: Soft and nondistended, no hernia, no mass and no tenderness.    Laboratory data: Chemistries remarkable only for a creatinine at 3.5.  Remainder of electrolytes look reasonable.  Albumin is 3.4.  Total bilirubin 0.7, AST and ALT appeared normal.  White blood cell count 15.9, hemoglobin 9.8, neutrophil percentage 89.7.  Lipase is slightly elevated at 129    Imaging data: CT of the abdomen and pelvis reviewed by me and compared to prior CT.  There are some mildly dilated small bowel loops which are certainly much improved from prior study.  No evidence of any inflammatory process.  Small stones seen in the gallbladder without evidence for inflammation.  Pancreas does not appear inflamed.      Assessment and plan:   -Nausea and abdominal pain most consistent with ileus or partial bowel obstruction  -Cholelithiasis without evidence for cholecystitis  -Mildly elevated lipase, etiology and significance is unclear  -End-stage renal disease on dialysis    I'm going to get a gallbladder ultrasound to rule out evidence for cholecystitis on that exam.  By CT and by exam is does not seem like cholecystitis.  His lipase is mildly elevated but his liver function studies are otherwise normal and the overall pattern doesn't strike me as typical for biliary pancreatitis.  His pattern is most consistent with that of a partial bowel obstruction which clinically seems improved this morning.  I'm going to get him on clear liquids today and if he has trouble with that and I think it would be reasonable to think about doing a small bowel follow-through.  No plans for cholecystectomy at this time.  I think given his age and overall health status we should try to avoid surgery if  possible.    Julio Snowden MD  General and Endoscopic Surgery  [facility]    [address], [address]  [place], [place], [address]  P: [phone]  F: [phone]          Julio Snwoden MD, [facility]  General, Minimally Invasive and Endoscopic Surgery  [facility]    [address], [address]  [place], [place], [address]  P: [phone]  F: [phone]

## 2018-12-22 NOTE — OUTREACH NOTE
Medical Week 1 Survey      Responses   Facility patient discharged from?  [place]   Does the patient have one of the following disease processes/diagnoses(primary or secondary)?  Other   Is there a successful TCM telephone encounter documented?  No   Week 1 attempt successful?  No   Revoke  Readmitted          Klaudia Jiménez RN

## 2018-12-22 NOTE — H&P
Patient Care Team:  Andrew Trujillo MD as PCP - General  Andrew Trujillo MD as PCP - Family Medicine  SumterNancy MD as PCP - Claims Attributed    Chief complaint abd pain    Subjective     History of Present Illness    This is an 88-year-old gentleman who has a history of atrial fibrillation, hypertension, end-stage renal disease who was recently admitted to the hospital for small bowel obstruction.  The patient was seen by general surgery treated conservatively with an NG tube and decompression.  The patient's small bowel obstruction resolved without surgical intervention.  The patient was tolerating a regular diet and was discharged.    The patient now comes to the hospital because of groin pain, mild constipation and back pain.  In the emergency room CT scan of the abdomen reveals that the patient has elevated lipase of 127.  Patient is being admitted to the hospital for concern of pancreatitis.    Echo on 12/12 shows an EF of 54%.    Review of Systems   Constitutional: Negative for chills, diaphoresis, fatigue, fever and unexpected weight change.   HENT: Negative for congestion and hearing loss.    Eyes: Negative for redness and visual disturbance.   Respiratory: Negative for cough, chest tightness and shortness of breath.    Cardiovascular: Negative for chest pain and palpitations.   Gastrointestinal: Positive for abdominal pain, nausea and vomiting. Negative for abdominal distention and blood in stool.   Endocrine: Negative for cold intolerance and heat intolerance.   Genitourinary: Negative for difficulty urinating, dysuria and frequency.   Musculoskeletal: Negative for arthralgias, back pain and myalgias.   Skin: Negative for color change, rash and wound.   Neurological: Negative for syncope, weakness and headaches.   Hematological: Negative for adenopathy. Does not bruise/bleed easily.   Psychiatric/Behavioral: Negative for confusion. The patient is not nervous/anxious.          Past Medical History:   Diagnosis Date   • Anemia    • 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CMS/HCC)    • Arthritis    • Atypical chest pain    • Blind right eye    • Chronic kidney disease (CKD), stage V (CMS/HCC)    • Dizziness    • Dyslipidemia    • Esophageal reflux    • Esophagitis, reflux    • Femur fracture, right (CMS/HCC)    • Hypertension    • Macular degeneration    • Malignant neoplasm of prostate (CMS/HCC)     gland   • Nephrolithiasis    • Nonspecific abnormal finding    • Paroxysmal atrial fibrillation (CMS/HCC)    • Postnasal drip    • Premature ventricular contractions    • Renal disease    • Throat pain    • Urge incontinence of urine    • Ventricular tachycardia (CMS/HCC)      Past Surgical History:   Procedure Laterality Date   • ARTERIOVENOUS FISTULA Left 2015   • HAND SURGERY Bilateral    • HIP SURGERY     • INGUINAL HERNIA REPAIR     • KNEE SURGERY     • OTHER SURGICAL HISTORY      cardiac cath procedure summary normal, corneal lasik   • REPLACEMENT TOTAL KNEE      left    • SHOULDER SURGERY     • TONSILLECTOMY AND ADENOIDECTOMY       Family History   Problem Relation Age of Onset   • Other Brother         acute bacterial endocarditis     Social History     Tobacco Use   • Smoking status: Former Smoker   • Smokeless tobacco: Never Used   Substance Use Topics   • Alcohol use: No   • Drug use: No     Medications Prior to Admission   Medication Sig Dispense Refill Last Dose   • Calcium Acetate 668 (169 Ca) MG tablet Take 2 Units by mouth 3 (Three) Times a Day With Meals. 180 tablet 11    • DILT- MG 24 hr capsule TAKE 1 CAPSULE EVERY NIGHT 90 capsule 3    • famotidine (PEPCID) 40 MG tablet TAKE 1 TABLET DAILY 90 tablet 3 Taking   • finasteride (PROSCAR) 5 MG tablet Take 1 tablet by mouth Daily. 30 tablet 0    • HYDROcodone-acetaminophen (NORCO) 5-325 MG per tablet Take 1 tablet by mouth Every 6 (Six) Hours As Needed for Moderate Pain . 15 tablet 0    • lidocaine (LIDODERM) 5 % Place 2 patches on the  skin as directed by provider Daily. Remove & Discard patch within 12 hours or as directed by MD 15 each 0    • ondansetron ODT (ZOFRAN-ODT) 4 MG disintegrating tablet Take 1 tablet by mouth Every 8 (Eight) Hours As Needed for Nausea or Vomiting. 30 tablet 0    • sevelamer (RENVELA) 800 MG tablet Take 800 mg by mouth. 2 tablts 4 times daily   Taking   • triphrocaps (NEPHROCAPS) 1 MG capsule capsule Take 1 capsule by mouth.   Taking   • warfarin (COUMADIN) 2.5 MG tablet Take 2.5 mg by mouth every other day.   Taking   • warfarin (JANTOVEN) 5 MG tablet TAKE 1 TABLET DAILY OR AS  DIRECTED 90 tablet 3      Allergies:  Nitrofurantoin and Amiodarone    Objective      Vital Signs  Temp:  [97.4 °F (36.3 °C)-98.4 °F (36.9 °C)] 98.4 °F (36.9 °C)  Heart Rate:  [] 99  Resp:  [16-20] 20  BP: ()/() 132/70    Physical Exam   Constitutional: He appears well-developed and well-nourished. No distress.   HENT:   Head: Normocephalic and atraumatic.   Eyes: Conjunctivae and EOM are normal.   Cardiovascular: Normal heart sounds. Exam reveals no gallop and no friction rub.   No murmur heard.  aFib and frequent pvcs on the monitor    Pulmonary/Chest: Effort normal and breath sounds normal. He has no wheezes. He has no rales.   Abdominal: Soft. Bowel sounds are normal. He exhibits no distension and no mass. There is no tenderness. There is no rebound and no guarding.   Musculoskeletal: He exhibits no edema.   Neurological: He is alert.   Hard of hearing   Skin: Skin is warm and dry. He is not diaphoretic.   Psychiatric: He has a normal mood and affect. His behavior is normal.       Results Review:   I reviewed the patient's new clinical results.  I reviewed the patient's new imaging results and agree with the interpretation.      Assessment/Plan       Acute biliary pancreatitis without infection or necrosis    Chronic atrial fibrillation (CMS/HCC)    ESRD (end stage renal disease) (CMS/HCC)    Closed fracture of multiple  ribs of right side      Assessment & Plan    Acute biliary pancreatitis without infection or necrosis  -fluids at 75 cc per hour      Chronic atrial fibrillation (CMS/[facility])  -bp is now low and having to hold on diltiazem so will ask cardiology to see    ESRD (end stage renal disease) (CMS/[facility])  -nephrology to see      Closed fracture of multiple ribs of right side    Hypotension-fluids, hold cardizem    H/o htn    Leukocytosis-check procal in the am, temp curve is near normal, just minimally elevated    I discussed the patients findings and my recommendations with patient, family and consulting provider    Kris Davila MD  12/22/18  9:59 AM    Time:

## 2018-12-22 NOTE — ED PROVIDER NOTES
EMERGENCY DEPARTMENT ENCOUNTER    CHIEF COMPLAINT  Chief Complaint: Abd pain, N/V  History given by: PT  History limited by: none  Room Number: 16/16  PMD: Andrew Trujillo MD      HPI:  Pt is a 88 y.o. male who presents complaining of diffuse abd pain and N/V that began this afternoon while on Dialysis. Pt also c/o groin pain, mild constipation, and chronic unchanged back pain. Pt denies hematochezia, fever, or SOA. Pt states he had a similar episode 2.5 weeks ago that improved and returned today. Per family, pt was recently d/c from the hospital for a bowel obstruction.     Duration:  Since this afternoon  Onset: gradual  Timing: constant  Location: diffuse abd  Radiation: none  Quality: pain; N/V  Intensity/Severity: moderate  Progression: unchanged  Associated Symptoms: groin pain, mild constipation, and chronic unchanged back pain  Aggravating Factors: none  Alleviating Factors: none  Previous Episodes: Pt states he had a similar episode 2.5 weeks ago that improved and returned today.   Treatment before arrival: Pt states he was having dialysis when Sx began.     PAST MEDICAL HISTORY  Active Ambulatory Problems     Diagnosis Date Noted   • Ventricular tachycardia (CMS/HCC) 08/04/2016   • Chronic atrial fibrillation (CMS/HCC) 08/04/2016   • Hypertension 12/09/2018   • ESRD (end stage renal disease) (CMS/HCC) 12/09/2018   • Closed fracture of multiple ribs of right side 12/09/2018   • Pleural effusion on right 12/09/2018   • Kidney cysts 12/09/2018   • Liver cyst 12/09/2018     Resolved Ambulatory Problems     Diagnosis Date Noted   • Small bowel obstruction (CMS/HCC) 12/09/2018   • Leukocytosis 12/09/2018   • Metabolic encephalopathy 12/12/2018     Past Medical History:   Diagnosis Date   • Anemia    • 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CMS/HCC)    • Arthritis    • Atypical chest pain    • Blind right eye    • Chronic kidney disease (CKD), stage V (CMS/HCC)    • Dizziness    • Dyslipidemia    • Esophageal reflux     • Esophagitis, reflux    • Femur fracture, right (CMS/HCC)    • Hypertension    • Macular degeneration    • Malignant neoplasm of prostate (CMS/HCC)    • Nephrolithiasis    • Nonspecific abnormal finding    • Paroxysmal atrial fibrillation (CMS/HCC)    • Postnasal drip    • Premature ventricular contractions    • Renal disease    • Throat pain    • Urge incontinence of urine    • Ventricular tachycardia (CMS/HCC)        PAST SURGICAL HISTORY  Past Surgical History:   Procedure Laterality Date   • ARTERIOVENOUS FISTULA Left 2015   • HAND SURGERY Bilateral    • HIP SURGERY     • INGUINAL HERNIA REPAIR     • KNEE SURGERY     • OTHER SURGICAL HISTORY      cardiac cath procedure summary normal, corneal lasik   • REPLACEMENT TOTAL KNEE      left    • SHOULDER SURGERY     • TONSILLECTOMY AND ADENOIDECTOMY         FAMILY HISTORY  Family History   Problem Relation Age of Onset   • Other Brother         acute bacterial endocarditis       SOCIAL HISTORY  Social History     Socioeconomic History   • Marital status:      Spouse name: Not on file   • Number of children: Not on file   • Years of education: Not on file   • Highest education level: Not on file   Social Needs   • Financial resource strain: Not on file   • Food insecurity - worry: Not on file   • Food insecurity - inability: Not on file   • Transportation needs - medical: Not on file   • Transportation needs - non-medical: Not on file   Occupational History   • Not on file   Tobacco Use   • Smoking status: Former Smoker   • Smokeless tobacco: Never Used   Substance and Sexual Activity   • Alcohol use: No   • Drug use: No   • Sexual activity: Defer   Other Topics Concern   • Not on file   Social History Narrative   • Not on file       ALLERGIES  Nitrofurantoin and Amiodarone    REVIEW OF SYSTEMS  Review of Systems    PHYSICAL EXAM  ED Triage Vitals [12/21/18 2309]   Temp Heart Rate Resp BP SpO2   98 °F (36.7 °C) 82 19 (!) 134/110 98 %      Temp src Heart Rate  Source Patient Position BP Location FiO2 (%)   Oral -- -- -- --       Physical Exam   Constitutional: He is oriented to person, place, and time. No distress.   HENT:   Head: Normocephalic and atraumatic.   Eyes: EOM are normal. Pupils are equal, round, and reactive to light.   Neck: Normal range of motion. Neck supple.   Cardiovascular: Normal rate, regular rhythm and normal heart sounds.   Pulmonary/Chest: Effort normal and breath sounds normal. No respiratory distress. He has no wheezes. He has no rales.   Abdominal: Soft. He exhibits no mass. There is generalized tenderness (mild). There is no rebound and no guarding.   Musculoskeletal: Normal range of motion. He exhibits no edema or tenderness.   Neurological: He is alert and oriented to person, place, and time. He has normal sensation and normal strength.   No focal neurological deficits   Skin: Skin is warm and dry.   Psychiatric: Mood and affect normal.   Nursing note and vitals reviewed.      LAB RESULTS  Lab Results (last 24 hours)     Procedure Component Value Units Date/Time    CBC & Differential [[id number]] Collected:  12/22/18 0002    Specimen:  Blood Updated:  12/22/18 0018    Narrative:       The following orders were created for panel order CBC & Differential.  Procedure                               Abnormality         Status                     ---------                               -----------         ------                     CBC Auto Differential[[id number]]        Abnormal            Final result                 Please view results for these tests on the individual orders.    Comprehensive Metabolic Panel [[id number]]  (Abnormal) Collected:  12/22/18 0002    Specimen:  Blood Updated:  12/22/18 0105     Glucose 129 mg/dL      BUN 17 mg/dL      Creatinine 3.49 mg/dL      Sodium 138 mmol/L      Potassium 4.1 mmol/L      Chloride 98 mmol/L      CO2 23.9 mmol/L      Calcium 9.6 mg/dL      Total Protein 6.5 g/dL      Albumin 3.40 g/dL      ALT (SGPT)  18 U/L      AST (SGOT) 19 U/L      Alkaline Phosphatase 111 U/L      Total Bilirubin 0.7 mg/dL      eGFR Non African Amer 17 mL/min/1.73      Globulin 3.1 gm/dL      A/G Ratio 1.1 g/dL      BUN/Creatinine Ratio 4.9     Anion Gap 16.1 mmol/L     Narrative:       The MDRD GFR formula is only valid for adults with stable renal function between ages 18 and 70.    Protime-INR [[id number]]  (Abnormal) Collected:  12/22/18 0002    Specimen:  Blood Updated:  12/22/18 0026     Protime 24.9 Seconds      INR 2.29    Lipase [[id number]]  (Abnormal) Collected:  12/22/18 0002    Specimen:  Blood Updated:  12/22/18 0037     Lipase 127 U/L     CBC Auto Differential [[id number]]  (Abnormal) Collected:  12/22/18 0002    Specimen:  Blood Updated:  12/22/18 0018     WBC 15.89 10*3/mm3      RBC 3.16 10*6/mm3      Hemoglobin 9.8 g/dL      Hematocrit 31.0 %      MCV 98.1 fL      MCH 31.0 pg      MCHC 31.6 g/dL      RDW 14.5 %      RDW-SD 52.1 fl      MPV 9.9 fL      Platelets 245 10*3/mm3      Neutrophil % 89.7 %      Lymphocyte % 3.1 %      Monocyte % 6.4 %      Eosinophil % 0.4 %      Basophil % 0.1 %      Immature Grans % 0.3 %      Neutrophils, Absolute 14.28 10*3/mm3      Lymphocytes, Absolute 0.49 10*3/mm3      Monocytes, Absolute 1.01 10*3/mm3      Eosinophils, Absolute 0.06 10*3/mm3      Basophils, Absolute 0.01 10*3/mm3      Immature Grans, Absolute 0.04 10*3/mm3     Lactic Acid, Plasma [[id number]] Collected:  12/22/18 0500    Specimen:  Blood Updated:  12/22/18 0505    Blood Culture - Blood, Blood, Venous Line [[id number]] Collected:  12/22/18 0500    Specimen:  Blood, Venous Line Updated:  12/22/18 0505          I ordered the above labs and reviewed the results    RADIOLOGY  CT Abdomen Pelvis Without Contrast   Final Result   1.  Stable right pleural effusion.   2. Stable hepatic and renal findings.   3. Extensive diverticulosis.   4. Diminishing small bowel dilatation which may be related to improving   obstruction or ileus.   5.  Uncomplicated cholelithiasis                   This report was finalized on 12/22/2018 1:30 AM by Jorge Allen M.D.          XR Abdomen KUB   Final Result   1. Slight decrease in small bowel dilatation which may be related to   improving obstruction or ileus.    .       This report was finalized on 12/22/2018 12:28 AM by Jorge Allen M.D.               I ordered the above noted radiological studies. Interpreted by radiologist. Reviewed by me in PACS.       PROCEDURES  Procedures      PROGRESS AND CONSULTS       2323  Ordered lipase, CMP, CBC, UA, and INR for further evaluation. Ordered IV Morphine and Zofran for pain and nausea.     2334  Ordered XR abd KUB    0111  Pt recheck. Pt is asleep. Family at bedside. Notified family of lab work, including lipase of 127. Discussed plan to further evaluate with CT abd/pelvis to observe the pancreas. Pt and family are agreeable.      0327  Pt recheck. Pt asleep. Family at bedside. Notified family of CT abd/pelvis results that shows cholelithiasis and improved bowel obstruction. D/w family there could be a mild blockage that could cause the pancreas to flare up. Discussed plan to admit pt due to his Hx for further monitoring and treatment. Family understand and agree with plan, all concerns addressed.     0332  Discussed the pt's case with Dr. Le, [facility] who agrees to admit the pt.          MEDICAL DECISION MAKING  Results were reviewed/discussed with the patient and they were also made aware of online access. Pt also made aware that some labs, such as cultures, will not be resulted during ER visit and follow up with PMD is necessary.     MDM  Number of Diagnoses or Management Options  Acute biliary pancreatitis without infection or necrosis:   Gall stones:      Amount and/or Complexity of Data Reviewed  Clinical lab tests: ordered and reviewed (Lipase-127; CBC: WBC-15.89, Hgb-9.8; CMP: Creatinine-3.49; INR-2.29)  Tests in the radiology section of CPT®: ordered and  reviewed (CT abd/pelvis shows stable right pleural effusion, stable hepatic and renal findings, extensive diverticulosis, diminishing small bowel dilatation which may be related to improving obstruction or ileus, and uncomplicated cholelithiasis  )  Decide to obtain previous medical records or to obtain history from someone other than the patient: yes  Discuss the patient with other providers: yes (Dr. Le, [facility])           DIAGNOSIS  Final diagnoses:   Acute biliary pancreatitis without infection or necrosis   Gall stones       DISPOSITION  ADMISSION    Discussed treatment plan and reason for admission with pt/family and admitting physician.  Pt/family voiced understanding of the plan for admission for further testing/treatment as needed.         Latest Documented Vital Signs:  As of 5:09 AM  BP- 116/68 HR- 110 Temp- 97.4 °F (36.3 °C) (Tympanic) O2 sat- 97%    --  Documentation assistance provided by eric Cooper for Dr. Dawson.  Information recorded by the scribe was done at my direction and has been verified and validated by me.            Jasen Cooper  12/22/18 0515       Henrique Dawson MD  12/22/18 5061

## 2018-12-23 LAB
ALBUMIN SERPL-MCNC: 2.3 G/DL (ref 3.5–5.2)
ANION GAP SERPL CALCULATED.3IONS-SCNC: 13.2 MMOL/L
BASOPHILS # BLD AUTO: 0.02 10*3/MM3 (ref 0–0.2)
BASOPHILS NFR BLD AUTO: 0.2 % (ref 0–1.5)
BUN BLD-MCNC: 36 MG/DL (ref 8–23)
BUN/CREAT SERPL: 6.8 (ref 7–25)
CALCIUM SPEC-SCNC: 8.4 MG/DL (ref 8.6–10.5)
CHLORIDE SERPL-SCNC: 105 MMOL/L (ref 98–107)
CO2 SERPL-SCNC: 19.8 MMOL/L (ref 22–29)
CREAT BLD-MCNC: 5.26 MG/DL (ref 0.76–1.27)
DEPRECATED RDW RBC AUTO: 56.4 FL (ref 37–54)
EOSINOPHIL # BLD AUTO: 0.21 10*3/MM3 (ref 0–0.7)
EOSINOPHIL NFR BLD AUTO: 2 % (ref 0.3–6.2)
ERYTHROCYTE [DISTWIDTH] IN BLOOD BY AUTOMATED COUNT: 15.2 % (ref 11.5–14.5)
FERRITIN SERPL-MCNC: 1188 NG/ML (ref 30–400)
GFR SERPL CREATININE-BSD FRML MDRD: 10 ML/MIN/1.73
GFR SERPL CREATININE-BSD FRML MDRD: ABNORMAL ML/MIN/1.73
GLUCOSE BLD-MCNC: 84 MG/DL (ref 65–99)
HCT VFR BLD AUTO: 25.3 % (ref 40.4–52.2)
HGB BLD-MCNC: 7.8 G/DL (ref 13.7–17.6)
IMM GRANULOCYTES # BLD AUTO: 0.03 10*3/MM3 (ref 0–0.03)
IMM GRANULOCYTES NFR BLD AUTO: 0.3 % (ref 0–0.5)
INR PPP: 3.43 (ref 0.9–1.1)
INR PPP: 3.49 (ref 0.9–1.1)
IRON 24H UR-MRATE: 18 MCG/DL (ref 59–158)
IRON SATN MFR SERPL: 13 % (ref 20–50)
LYMPHOCYTES # BLD AUTO: 0.89 10*3/MM3 (ref 0.9–4.8)
LYMPHOCYTES NFR BLD AUTO: 8.5 % (ref 19.6–45.3)
MCH RBC QN AUTO: 31.5 PG (ref 27–32.7)
MCHC RBC AUTO-ENTMCNC: 30.8 G/DL (ref 32.6–36.4)
MCV RBC AUTO: 102 FL (ref 79.8–96.2)
MONOCYTES # BLD AUTO: 0.96 10*3/MM3 (ref 0.2–1.2)
MONOCYTES NFR BLD AUTO: 9.2 % (ref 5–12)
NEUTROPHILS # BLD AUTO: 8.33 10*3/MM3 (ref 1.9–8.1)
NEUTROPHILS NFR BLD AUTO: 79.8 % (ref 42.7–76)
PHOSPHATE SERPL-MCNC: 4.5 MG/DL (ref 2.5–4.5)
PLATELET # BLD AUTO: 190 10*3/MM3 (ref 140–500)
PMV BLD AUTO: 9.8 FL (ref 6–12)
POTASSIUM BLD-SCNC: 4.1 MMOL/L (ref 3.5–5.2)
PROCALCITONIN SERPL-MCNC: 0.94 NG/ML (ref 0.1–0.25)
PROTHROMBIN TIME: 34.1 SECONDS (ref 11.7–14.2)
PROTHROMBIN TIME: 34.5 SECONDS (ref 11.7–14.2)
RBC # BLD AUTO: 2.48 10*6/MM3 (ref 4.6–6)
SODIUM BLD-SCNC: 138 MMOL/L (ref 136–145)
TIBC SERPL-MCNC: 143 MCG/DL
TRANSFERRIN SERPL-MCNC: 96 MG/DL (ref 200–360)
WBC NRBC COR # BLD: 10.44 10*3/MM3 (ref 4.5–10.7)

## 2018-12-23 PROCEDURE — 5A1D70Z PERFORMANCE OF URINARY FILTRATION, INTERMITTENT, LESS THAN 6 HOURS PER DAY: ICD-10-PCS | Performed by: INTERNAL MEDICINE

## 2018-12-23 PROCEDURE — 84145 PROCALCITONIN (PCT): CPT | Performed by: HOSPITALIST

## 2018-12-23 PROCEDURE — 85610 PROTHROMBIN TIME: CPT | Performed by: HOSPITALIST

## 2018-12-23 PROCEDURE — 63410000001 EPOETIN ALFA PER 1000 UNITS: Performed by: INTERNAL MEDICINE

## 2018-12-23 PROCEDURE — 25010000002 NA FERRIC GLUC CPLX PER 12.5 MG: Performed by: INTERNAL MEDICINE

## 2018-12-23 PROCEDURE — 85025 COMPLETE CBC W/AUTO DIFF WBC: CPT | Performed by: HOSPITALIST

## 2018-12-23 PROCEDURE — 82728 ASSAY OF FERRITIN: CPT | Performed by: INTERNAL MEDICINE

## 2018-12-23 PROCEDURE — 83540 ASSAY OF IRON: CPT | Performed by: INTERNAL MEDICINE

## 2018-12-23 PROCEDURE — 99232 SBSQ HOSP IP/OBS MODERATE 35: CPT | Performed by: SURGERY

## 2018-12-23 PROCEDURE — 80069 RENAL FUNCTION PANEL: CPT | Performed by: INTERNAL MEDICINE

## 2018-12-23 PROCEDURE — 84466 ASSAY OF TRANSFERRIN: CPT | Performed by: INTERNAL MEDICINE

## 2018-12-23 RX ORDER — LIDOCAINE AND PRILOCAINE 25; 25 MG/G; MG/G
CREAM TOPICAL AS NEEDED
Status: DISCONTINUED | OUTPATIENT
Start: 2018-12-23 | End: 2018-12-24 | Stop reason: HOSPADM

## 2018-12-23 RX ORDER — DOCUSATE SODIUM 100 MG/1
100 CAPSULE, LIQUID FILLED ORAL 2 TIMES DAILY
Status: DISCONTINUED | OUTPATIENT
Start: 2018-12-23 | End: 2018-12-24 | Stop reason: HOSPADM

## 2018-12-23 RX ORDER — WARFARIN SODIUM 3 MG/1
3 TABLET ORAL
Status: DISCONTINUED | OUTPATIENT
Start: 2018-12-23 | End: 2018-12-23

## 2018-12-23 RX ORDER — ALBUMIN (HUMAN) 12.5 G/50ML
12.5 SOLUTION INTRAVENOUS AS NEEDED
Status: DISCONTINUED | OUTPATIENT
Start: 2018-12-23 | End: 2018-12-24 | Stop reason: HOSPADM

## 2018-12-23 RX ADMIN — SODIUM CHLORIDE, PRESERVATIVE FREE 3 ML: 5 INJECTION INTRAVENOUS at 09:08

## 2018-12-23 RX ADMIN — FINASTERIDE 5 MG: 5 TABLET, FILM COATED ORAL at 08:36

## 2018-12-23 RX ADMIN — DOCUSATE SODIUM 100 MG: 100 CAPSULE, LIQUID FILLED ORAL at 23:23

## 2018-12-23 RX ADMIN — SEVELAMER CARBONATE 800 MG: 800 TABLET, FILM COATED ORAL at 08:36

## 2018-12-23 RX ADMIN — FAMOTIDINE 40 MG: 40 TABLET, FILM COATED ORAL at 08:36

## 2018-12-23 RX ADMIN — ERYTHROPOIETIN 8000 UNITS: 4000 INJECTION, SOLUTION INTRAVENOUS; SUBCUTANEOUS at 16:42

## 2018-12-23 RX ADMIN — LIDOCAINE AND PRILOCAINE 1 APPLICATION: 25; 25 CREAM TOPICAL at 12:00

## 2018-12-23 RX ADMIN — SODIUM CHLORIDE 125 MG: 9 INJECTION, SOLUTION INTRAVENOUS at 17:00

## 2018-12-23 RX ADMIN — SODIUM CHLORIDE 75 ML/HR: 9 INJECTION, SOLUTION INTRAVENOUS at 00:37

## 2018-12-23 RX ADMIN — SODIUM CHLORIDE, PRESERVATIVE FREE 3 ML: 5 INJECTION INTRAVENOUS at 23:24

## 2018-12-23 RX ADMIN — SEVELAMER CARBONATE 800 MG: 800 TABLET, FILM COATED ORAL at 12:00

## 2018-12-23 NOTE — PROGRESS NOTES
[facility]     LOS: 1 day     Subjective:    CC:Abdominal Pain and Vomiting    DIET:  Diet Order   Procedures   • Diet Full Liquid     The abd pain is gone, diet has been advance and he is tolerating po well  No cp, no soa, no n/v    Objective:    Vital Signs:  Temp:  [97.6 °F (36.4 °C)-97.9 °F (36.6 °C)] 97.9 °F (36.6 °C)  Heart Rate:  [] 106  Resp:  [16-20] 16  BP: ()/(54-64) 119/62    SpO2:  [90 %-100 %] 93 %  on   ;   Device (Oxygen Therapy): room air  Body mass index is 21.41 kg/m².    Physical Exam   Constitutional: He appears well-developed and well-nourished. No distress.   HENT:   Head: Normocephalic and atraumatic.   Cardiovascular: Exam reveals no friction rub.   No murmur heard.  Afib, hr is 99 currently on the monitor   Pulmonary/Chest: Effort normal and breath sounds normal.   Abdominal: Soft. Bowel sounds are normal. He exhibits no distension. There is no tenderness.   Neurological: He is alert.   Skin: Skin is warm and dry.       Results Review:    Glucose   Date Value Ref Range Status   12/23/2018 84 65 - 99 mg/dL Final   12/22/2018 129 (H) 65 - 99 mg/dL Final     Results from last 7 days   Lab Units  12/23/18   0422   WBC 10*3/mm3  10.44   HEMOGLOBIN g/dL  7.8*   HEMATOCRIT %  25.3*   PLATELETS 10*3/mm3  190     Results from last 7 days   Lab Units  12/23/18   0422  12/22/18   0002   SODIUM mmol/L  138  138   POTASSIUM mmol/L  4.1  4.1   CHLORIDE mmol/L  105  98   CO2 mmol/L  19.8*  23.9   BUN mg/dL  36*  17   CREATININE mg/dL  5.26*  3.49*   CALCIUM mg/dL  8.4*  9.6   BILIRUBIN mg/dL   --   0.7   ALK PHOS U/L   --   111   ALT (SGPT) U/L   --   18   AST (SGOT) U/L   --   19   GLUCOSE mg/dL  84  129*     Results from last 7 days   Lab Units  12/22/18   0002   INR   2.29*             Cultures:  Blood Culture   Date Value Ref Range Status   12/22/2018 No growth at 24 hours  Preliminary   12/22/2018 No growth at 24 hours  Preliminary       I have reviewed daily  medications and changes in CPOE    Scheduled meds    docusate sodium 100 mg Oral BID   epoetin parvin 8,000 Units Intravenous Once per day on Mon Wed Fri   famotidine 40 mg Oral Daily   finasteride 5 mg Oral Daily   lidocaine 2 patch Transdermal Q24H   sodium chloride 3 mL Intravenous Q12H         Sodium chloride 75 mL/hr Last Rate: 75 mL/hr (12/23/18 0037)     PRN meds  •  acetaminophen  •  albumin human  •  HYDROcodone-acetaminophen  •  lidocaine-prilocaine  •  nitroglycerin  •  ondansetron **OR** ondansetron ODT **OR** ondansetron  •  ondansetron ODT  •  sodium chloride  •  [COMPLETED] Insert peripheral IV **AND** sodium chloride        Acute biliary pancreatitis without infection or necrosis    Chronic atrial fibrillation (CMS/HCC)    ESRD (end stage renal disease) (CMS/[facility])    Closed fracture of multiple ribs of right side        Assessment/Plan:  Acute biliary pancreatitis without infection or necrosis (seems less likely at this point); he is tolerating po well, lipase was relatively low, no evidence on CT scan and his pain was not epigastric  -fluids at 75 cc per hour, will stop given he on dialysis and tolerating po    -u/s shows cholelithiasis      Chronic atrial fibrillation (CMS/[facility])  -bp is now low and having to hold on diltiazem so will ask cardiology to see  -hr now starting to go up  -restart coumadin, d/w surgery      ESRD (end stage renal disease) (CMS/[facility])  -nephrology to see       Closed fracture of multiple ribs of right side by xray 12/6-no complaints today     Hypotension-fluids, hold cardizem     H/o htn     Leukocytosis-check procal in the am, temp curve is near normal, just minimally elevated   -procal is elevated but difficult to interpret in the setting of ESRD    DVT PPX: scd, coumadin, inr 2.2 yesterday, repeat now    Plan: surgery is advancing diet, maybe d/c tomorrow    Kris Davila MD  12/23/18  6:24 PM

## 2018-12-23 NOTE — PROCEDURES
"Name: Shaun Brewster  Age: 88 y.o. male  : 1930  MRN: [id number]  Date of Service: 2018      Evaluation on dialysis ( 1500 ).  /64 (BP Location: Right arm, Patient Position: Lying)   Pulse 82   Temp 97.8 °F (36.6 °C) (Oral)   Resp 16   Ht 185.4 cm (73\")   Wt 73.6 kg (162 lb 4.8 oz)   SpO2 90%   BMI 21.41 kg/m²   Currently tolerating well.  BP stable.  Exam stable.  Access OK.  Blood flow/P-Art/P-Vein OK.  Results from last 7 days   Lab Units  18   0422   SODIUM mmol/L  138   POTASSIUM mmol/L  4.1   CALCIUM mg/dL  8.4*     Bath appropriate. Discussed with HD nurse: no additional changes indicated.    ESRD: Will plan HD Wednesday unless issues require adjustment on daily reevaluation.   Anemia/CKD: Rx MINESH. TSAT only 13%, but Ferritin 1188; will use attenuated iron replacement, suspecting Ferritin elevation is largely representative of status as acute phase reactant rather than overload. 1 dose today.  Abdominal Pain: reason for admission  Hyperphophatemia: Rx diet. Will D/C Sevelamer given H/O SBO and use alternative agent when needed.  AFIB:      Raghav Galvin MD  Nephrology [facility]  [phone]  2018    "

## 2018-12-23 NOTE — PROGRESS NOTES
Follow-up partial small bowel obstruction    Subjective:  Tolerating clear liquids without much issue.  No nausea reported.  No significant abdominal pain.  No bowel movement.    Objective:  Patient has been afebrile, heart rate 94 blood pressure 106/60  Gen.: Awake alert and oriented, no acute distress  Eyes: Extraocular movement intact, no scleral icterus  Gastrointestinal: Abdomen is soft and nondistended, essentially nontender, no hernia or mass    White blood cell count 10.4 hemoglobin 7.8 INR 2.3.    Gallbladder ultrasound reviewed and shows no gallbladder wall thickening, no pericholecystic fluid or other signs of acute cholecystitis.  Common bile duct measures at 4 mm.    Assessment and plan:  -Partial small bowel obstruction, improving tolerating clears, advanced to full liquids today.  Add bowel regimen.  -Cholelithiasis without evidence for cholecystitis  -Chemical pancreatitis, repeat lipase    Julio Snowden MD  General and Endoscopic Surgery  [facility], [place]    [address], [address]  [place], [place], [address]  P: [phone]  F: [phone]

## 2018-12-23 NOTE — CONSULTS
Adult Nutrition  Assessment/PES    Patient Name:  Shaun Brewster  YOB: 1930  MRN: [id number]  Admit Date:  12/21/2018    Assessment Date:  12/23/2018    Comments:  Nutrition screen completed per RN trigger. Pt npo and this time.  Will remain available as needed when diet advanced.    Reason for Assessment     Row Name 12/23/18 1229          Reason for Assessment    Reason For Assessment  identified at risk by screening criteria     Diagnosis  -- abd pain, hx of ESRD on dialysis, SBO,           Anthropometrics     Row Name 12/23/18 1229          Body Mass Index (BMI)    BMI Assessment  BMI 18.5-24.9: normal         Labs/Tests/Procedures/Meds     Row Name 12/23/18 1229          Labs/Procedures/Meds    Lab Results Reviewed  reviewed     Lab Results Comments  WBC, cr, glu, alb        Diagnostic Tests/Procedures    Diagnostic Test/Procedure Reviewed  reviewed        Medications    Pertinent Medications Reviewed  reviewed         Physical Findings     Row Name 12/23/18 1230          Physical Findings    Skin  -- araceli 19           Nutrition Prescription Ordered     Row Name 12/23/18 1230          Nutrition Prescription PO    Current PO Diet  NPO                 Problem/Interventions:  Problem 1     Row Name 12/23/18 1230          Nutrition Diagnoses Problem 1    Problem 1  Predicted Suboptimal Intake     Etiology (related to)  -- abd pain, hx of ESRD                 Intervention Goal     Row Name 12/23/18 1231          Intervention Goal    General  Maintain nutrition     PO  Initiate feeding;Tolerate PO;PO intake (%)     PO Intake %  75 %     Weight  Maintain weight         Nutrition Intervention     Row Name 12/23/18 1231          Nutrition Intervention    RD/Tech Action  Care plan reviewd;Follow Tx progress;Await begin PO           Education/Evaluation     Row Name 12/23/18 1231          Education    Education  Will Instruct as appropriate        Monitor/Evaluation    Monitor  Per protocol            Electronically signed by:  Radha Lau RD  12/23/18 12:31 PM

## 2018-12-24 VITALS
WEIGHT: 162.3 LBS | DIASTOLIC BLOOD PRESSURE: 75 MMHG | SYSTOLIC BLOOD PRESSURE: 121 MMHG | BODY MASS INDEX: 21.51 KG/M2 | TEMPERATURE: 97.6 F | HEART RATE: 93 BPM | OXYGEN SATURATION: 90 % | RESPIRATION RATE: 18 BRPM | HEIGHT: 73 IN

## 2018-12-24 LAB
BASOPHILS # BLD AUTO: 0.01 10*3/MM3 (ref 0–0.2)
BASOPHILS NFR BLD AUTO: 0.2 % (ref 0–1.5)
DEPRECATED RDW RBC AUTO: 50.8 FL (ref 37–54)
EOSINOPHIL # BLD AUTO: 0.18 10*3/MM3 (ref 0–0.7)
EOSINOPHIL NFR BLD AUTO: 2.9 % (ref 0.3–6.2)
ERYTHROCYTE [DISTWIDTH] IN BLOOD BY AUTOMATED COUNT: 14.9 % (ref 11.5–14.5)
HCT VFR BLD AUTO: 25.2 % (ref 40.4–52.2)
HGB BLD-MCNC: 8.3 G/DL (ref 13.7–17.6)
IMM GRANULOCYTES # BLD AUTO: 0.02 10*3/MM3 (ref 0–0.03)
IMM GRANULOCYTES NFR BLD AUTO: 0.3 % (ref 0–0.5)
INR PPP: 3.2 (ref 0.9–1.1)
LIPASE SERPL-CCNC: 50 U/L (ref 13–60)
LYMPHOCYTES # BLD AUTO: 0.8 10*3/MM3 (ref 0.9–4.8)
LYMPHOCYTES NFR BLD AUTO: 12.8 % (ref 19.6–45.3)
MCH RBC QN AUTO: 31.1 PG (ref 27–32.7)
MCHC RBC AUTO-ENTMCNC: 32.9 G/DL (ref 32.6–36.4)
MCV RBC AUTO: 94.4 FL (ref 79.8–96.2)
MONOCYTES # BLD AUTO: 0.52 10*3/MM3 (ref 0.2–1.2)
MONOCYTES NFR BLD AUTO: 8.3 % (ref 5–12)
NEUTROPHILS # BLD AUTO: 4.75 10*3/MM3 (ref 1.9–8.1)
NEUTROPHILS NFR BLD AUTO: 75.8 % (ref 42.7–76)
PLATELET # BLD AUTO: 202 10*3/MM3 (ref 140–500)
PMV BLD AUTO: 9.8 FL (ref 6–12)
PROTHROMBIN TIME: 32.3 SECONDS (ref 11.7–14.2)
RBC # BLD AUTO: 2.67 10*6/MM3 (ref 4.6–6)
WBC NRBC COR # BLD: 6.26 10*3/MM3 (ref 4.5–10.7)

## 2018-12-24 PROCEDURE — 85025 COMPLETE CBC W/AUTO DIFF WBC: CPT | Performed by: HOSPITALIST

## 2018-12-24 PROCEDURE — 97162 PT EVAL MOD COMPLEX 30 MIN: CPT | Performed by: PHYSICAL THERAPIST

## 2018-12-24 PROCEDURE — 85610 PROTHROMBIN TIME: CPT | Performed by: HOSPITALIST

## 2018-12-24 PROCEDURE — 99222 1ST HOSP IP/OBS MODERATE 55: CPT | Performed by: INTERNAL MEDICINE

## 2018-12-24 PROCEDURE — 83690 ASSAY OF LIPASE: CPT | Performed by: SURGERY

## 2018-12-24 PROCEDURE — 97110 THERAPEUTIC EXERCISES: CPT | Performed by: PHYSICAL THERAPIST

## 2018-12-24 PROCEDURE — 99232 SBSQ HOSP IP/OBS MODERATE 35: CPT | Performed by: SURGERY

## 2018-12-24 PROCEDURE — 93010 ELECTROCARDIOGRAM REPORT: CPT | Performed by: INTERNAL MEDICINE

## 2018-12-24 PROCEDURE — 93005 ELECTROCARDIOGRAM TRACING: CPT | Performed by: INTERNAL MEDICINE

## 2018-12-24 RX ORDER — ACETAMINOPHEN 325 MG/1
650 TABLET ORAL EVERY 4 HOURS PRN
Status: ON HOLD
Start: 2018-12-24 | End: 2022-01-01

## 2018-12-24 RX ORDER — WARFARIN SODIUM 3 MG/1
3 TABLET ORAL
Status: DISCONTINUED | OUTPATIENT
Start: 2018-12-24 | End: 2018-12-24 | Stop reason: HOSPADM

## 2018-12-24 RX ORDER — POLYETHYLENE GLYCOL 3350 17 G/17G
17 POWDER, FOR SOLUTION ORAL
Start: 2018-12-24

## 2018-12-24 RX ORDER — POLYETHYLENE GLYCOL 3350 17 G/17G
17 POWDER, FOR SOLUTION ORAL DAILY
Status: DISCONTINUED | OUTPATIENT
Start: 2018-12-24 | End: 2018-12-24 | Stop reason: HOSPADM

## 2018-12-24 RX ORDER — FAMOTIDINE 20 MG/1
20 TABLET, FILM COATED ORAL DAILY
Status: DISCONTINUED | OUTPATIENT
Start: 2018-12-25 | End: 2018-12-24 | Stop reason: HOSPADM

## 2018-12-24 RX ADMIN — FAMOTIDINE 40 MG: 40 TABLET, FILM COATED ORAL at 09:15

## 2018-12-24 RX ADMIN — SODIUM CHLORIDE, PRESERVATIVE FREE 3 ML: 5 INJECTION INTRAVENOUS at 09:16

## 2018-12-24 RX ADMIN — POLYETHYLENE GLYCOL 3350 17 G: 17 POWDER, FOR SOLUTION ORAL at 14:38

## 2018-12-24 RX ADMIN — DOCUSATE SODIUM 100 MG: 100 CAPSULE, LIQUID FILLED ORAL at 09:15

## 2018-12-24 RX ADMIN — FINASTERIDE 5 MG: 5 TABLET, FILM COATED ORAL at 09:15

## 2018-12-24 RX ADMIN — LIDOCAINE 2 PATCH: 50 PATCH CUTANEOUS at 10:31

## 2018-12-24 NOTE — PLAN OF CARE
Problem: Patient Care Overview  Goal: Plan of Care Review   12/24/18 1148   OTHER   Outcome Summary Patient demonstrates impaired functional mobility/gait and will benefit from skilled PT in acute setting to help improve functional safety/independence prior to planned D/C home with assist (per daughter has CG in home and already has HHPT set up for T/TH from prior hospital admit).

## 2018-12-24 NOTE — PLAN OF CARE
Problem: Patient Care Overview  Goal: Plan of Care Review  Outcome: Ongoing (interventions implemented as appropriate)   12/24/18 0528   Coping/Psychosocial   Plan of Care Reviewed With patient   OTHER   Outcome Summary VSS; AFib on monitor. Intermittently confused (time mostly). Denies abd pain. Full liquid diet. Will continue to monitor       Problem: Fall Risk (Adult)  Goal: Identify Related Risk Factors and Signs and Symptoms  Outcome: Outcome(s) achieved Date Met: 12/24/18    Goal: Absence of Fall  Outcome: Ongoing (interventions implemented as appropriate)      Problem: Pancreatitis, Acute/Chronic (Adult)  Goal: Signs and Symptoms of Listed Potential Problems Will be Absent, Minimized or Managed (Pancreatitis, Acute/Chronic)  Outcome: Ongoing (interventions implemented as appropriate)

## 2018-12-24 NOTE — PROGRESS NOTES
Nephrology Progress Note  Patient: Shaun Brewster  Date of Service: 2018  5:32 PM  : 1930  MRN: [id number]  ATTENDING: Kris Davila MD  PRIMARY: Andrew Trujillo MD  _________________________________________    Follow up for: ESRD  HPI and Review of Systems:  Feels better. Ate breakfast well without pain, vomiting    ROS: Denies chest pain, dyspnea. + constipation        Scheduled Meds:  docusate sodium 100 mg Oral BID   epoetin parvin 8,000 Units Intravenous Once per day on    [START ON 2018] famotidine 20 mg Oral Daily   finasteride 5 mg Oral Daily   lidocaine 2 patch Transdermal Q24H   polyethylene glycol 17 g Oral Daily   sodium chloride 3 mL Intravenous Q12H   warfarin 3 mg Oral Daily     Continuous Infusions:   PRN Meds: •  acetaminophen  •  albumin human  •  HYDROcodone-acetaminophen  •  lidocaine-prilocaine  •  nitroglycerin  •  ondansetron **OR** ondansetron ODT **OR** ondansetron  •  sodium chloride  •  [COMPLETED] Insert peripheral IV **AND** sodium chloride    Objective  Temp:  [97.4 °F (36.3 °C)-97.9 °F (36.6 °C)] 97.6 °F (36.4 °C)  Heart Rate:  [88-99] 93  Resp:  [18] 18  BP: ()/(60-80) 121/75    General: Alert, cooperative, no distress  Skin: Warm and dry with normal turgor  Respiratory: respirations unlabored.  Cardiovascular: No rub.  No increased edema. Patent left arm access.  GI: soft, non-tender      LAB:  Results from last 7 days   Lab Units  18   0422  18   0002  18   0631  18   0612   SODIUM mmol/L  138  138  136  142   POTASSIUM mmol/L  4.1  4.1  3.9  4.2   CHLORIDE mmol/L  105  98  100  100   CO2 mmol/L  19.8*  23.9  22.0  23.3   BUN mg/dL  36*  17  50*  36*   CREATININE mg/dL  5.26*  3.49*  6.71*  5.12*   GLUCOSE mg/dL  84  129*  99  103*   CALCIUM mg/dL  8.4*  9.6  8.6  9.0   PHOSPHORUS mg/dL  4.5   --   5.0*  4.7*   ALBUMIN g/dL  2.30*  3.40*  2.80*  3.20*       Results from last 7 days   Lab Units  18   0998   12/23/18   0422  12/22/18   0002   WBC 10*3/mm3  6.26  10.44  15.89*   HEMOGLOBIN g/dL  8.3*  7.8*  9.8*   PLATELETS 10*3/mm3  202  190  245       Assessment:  Patient Active Problem List   Diagnosis   • Ventricular tachycardia (CMS/HCC)   • Chronic atrial fibrillation (CMS/HCC)   • Hypertension   • ESRD (end stage renal disease) (CMS/HCC)   • Closed fracture of multiple ribs of right side   • Pleural effusion on right   • Kidney cysts   • Liver cyst   • Acute biliary pancreatitis without infection or necrosis         Plan:  ESRD: Will plan for next HD Wednesday   Anemia/CKD: Rx MINESH. TSAT only 13%, but Ferritin 1188; will use attenuated iron replacement, suspecting Ferritin elevation is largely representative of status as acute phase reactant rather than overload. Can continue with outpatient Rx.  Abdominal Pain: reason for admission. Resolved.  Hyperphophatemia: Rx diet. Will D/C Sevelamer given H/O SBO and use alternative agent when needed (Calcium Acetate reasonable).  AFIB:    _______________________________  Raghav Galvin MD  [facility]  [phone]

## 2018-12-24 NOTE — THERAPY EVALUATION
Acute Care - Physical Therapy Initial Evaluation  [facility]     Patient Name: Shaun Brewster  : 1930  MRN: [id number]  Today's Date: 2018      Date of Referral to PT: 18  Referring Physician: Dr Davila      Admit Date: 2018    Visit Dx:     ICD-10-CM ICD-9-CM   1. Acute biliary pancreatitis without infection or necrosis K85.10 577.0   2. Gall stones K80.20 574.20     Patient Active Problem List   Diagnosis   • Ventricular tachycardia (CMS/HCC)   • Chronic atrial fibrillation (CMS/HCC)   • Hypertension   • ESRD (end stage renal disease) (CMS/HCC)   • Closed fracture of multiple ribs of right side   • Pleural effusion on right   • Kidney cysts   • Liver cyst   • Acute biliary pancreatitis without infection or necrosis     Past Medical History:   Diagnosis Date   • Anemia    • 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CMS/HCC)    • Arthritis    • Atypical chest pain    • Blind right eye    • Chronic kidney disease (CKD), stage V (CMS/HCC)    • Dizziness    • Dyslipidemia    • Esophageal reflux    • Esophagitis, reflux    • Femur fracture, right (CMS/HCC)    • Hypertension    • Macular degeneration    • Malignant neoplasm of prostate (CMS/HCC)     gland   • Nephrolithiasis    • Nonspecific abnormal finding    • Paroxysmal atrial fibrillation (CMS/HCC)    • Postnasal drip    • Premature ventricular contractions    • Renal disease    • Throat pain    • Urge incontinence of urine    • Ventricular tachycardia (CMS/HCC)      Past Surgical History:   Procedure Laterality Date   • ARTERIOVENOUS FISTULA Left    • HAND SURGERY Bilateral    • HIP SURGERY     • INGUINAL HERNIA REPAIR     • KNEE SURGERY     • OTHER SURGICAL HISTORY      cardiac cath procedure summary normal, corneal lasik   • REPLACEMENT TOTAL KNEE      left    • SHOULDER SURGERY     • TONSILLECTOMY AND ADENOIDECTOMY          PT ASSESSMENT (last 12 hours)      Physical Therapy Evaluation     Row Name 18 1100          PT Evaluation  Time/Intention    Subjective Information  complains of;weakness  -RA     Document Type  evaluation  -RA     Mode of Treatment  individual therapy;physical therapy  -RA     Patient Effort  good  -RA     Symptoms Noted During/After Treatment  fatigue;shortness of breath  -RA     Row Name 12/24/18 1100          General Information    Patient Profile Reviewed?  yes  -RA     Referring Physician  Dr Davila  -RA     Patient Observations  alert;cooperative;agree to therapy  -RA     Patient/Family Observations  daughter at bedside  -RA     General Observations of Patient  supine in bed, IV, heart monitor, pulse ox monitor  -RA     Prior Level of Function  independent: modified w/ AD  -RA     Equipment Currently Used at Home  commode, 3-in-1;rollator  -RA     Pertinent History of Current Functional Problem  Recently D/C from hospital, then had recurrence of abdominal pain after dialysis and readmitted  -RA     Row Name 12/24/18 1100          Relationship/Environment    Primary Source of Support/Comfort  child(sari) hired caregiver  -RA     Lives With  spouse  -RA     Row Name 12/24/18 1100          Resource/Environmental Concerns    Current Living Arrangements  home/apartment/condo  -RA     Row Name 12/24/18 1100          Cognitive Assessment/Intervention- PT/OT    Orientation Status (Cognition)  oriented to;person;place  -RA     Row Name 12/24/18 1100          Safety Issues, Functional Mobility    Safety Issues Affecting Function (Mobility)  at risk behavior observed;safety precaution awareness;safety precautions follow-through/compliance  -RA     Row Name 12/24/18 1100          Bed Mobility Assessment/Treatment    Bed Mobility Assessment/Treatment  supine-sit-supine  -RA     Supine-Sit-Supine [place] (Bed Mobility)  conditional independence bedrails  -RA     Row Name 12/24/18 1100          Transfer Assessment/Treatment    Transfer Assessment/Treatment  sit-stand transfer;stand-sit transfer  -RA     Sit-Stand  [place]ubrey (Transfers)  minimum assist (75% patient effort);contact guard  -RA     Stand-Sit [place] (Transfers)  minimum assist (75% patient effort);contact guard  -RA     Row Name 12/24/18 1100          Sit-Stand Transfer    Assistive Device (Sit-Stand Transfers)  walker, front-wheeled  -RA     Row Name 12/24/18 1100          Stand-Sit Transfer    Assistive Device (Stand-Sit Transfers)  walker, front-wheeled  -RA     Row Name 12/24/18 1100          Gait/Stairs Assessment/Training    [place]ubrey Level (Gait)  minimum assist (75% patient effort);contact guard  -RA     Assistive Device (Gait)  walker, front-wheeled  -RA     Distance in Feet (Gait)  150'  -RA     Deviations/Abnormal Patterns (Gait)  keagan decreased;gait speed decreased  -RA     Bilateral Gait Deviations  heel strike decreased;forward flexed posture  -RA     Row Name 12/24/18 1100          General ROM    GENERAL ROM COMMENTS  grossly WFL for age  -RA     Row Name 12/24/18 1100          MMT (Manual Muscle Testing)    General MMT Comments  functional strength grossly 3+/5 to 4-/5  -RA     Row Name 12/24/18 1100          Pain Assessment    Additional Documentation  Pain Scale: Word Pre/Post-Treatment (Group)  -RA     Row Name 12/24/18 1100          Pain Scale: Numbers Pre/Post-Treatment    Pain Location - Orientation  posterior  -RA     Pain Location  chest  -RA     Pain Intervention(s)  Repositioned  -RA     Row Name 12/24/18 1100          Pain Scale: Word Pre/Post-Treatment    Pain: Word Scale, Pretreatment  2 - mild pain  -RA     Pain: Word Scale, Post-Treatment  2 - mild pain  -RA     Pre/Post Treatment Pain Comment  hx of rib fractures - worse with pushing/pulling to reposition self  -RA     Row Name 12/24/18 1100          Physical Therapy Clinical Impression    Date of Referral to PT  12/22/18  -RA     PT Diagnosis (PT Clinical Impression)  impaired mobility and gait  -RA     Criteria for Skilled Interventions Met (PT Clinical Impression)   yes;treatment indicated  -RA     Pathology/Pathophysiology Noted (Describe Specifically for Each System)  musculoskeletal  -RA     Impairments Found (describe specific impairments)  aerobic capacity/endurance;ergonomics and body mechanics;gait, locomotion, and balance  -RA     Rehab Potential (PT Clinical Summary)  good, to achieve stated therapy goals  -RA     Row Name 12/24/18 1100          Physical Therapy Goals    Bed Mobility Goal Selection (PT)  --  -RA     Transfer Goal Selection (PT)  transfer, PT goal 1  -RA     Gait Training Goal Selection (PT)  gait training, PT goal 1  -RA     Row Name 12/24/18 1100          Transfer Goal 1 (PT)    Activity/Assistive Device (Transfer Goal 1, PT)  transfers, all  -RA     [place] Level/Cues Needed (Transfer Goal 1, PT)  supervision required;conditional independence  -RA     Time Frame (Transfer Goal 1, PT)  5 days  -RA     Row Name 12/24/18 1100          Gait Training Goal 1 (PT)    Activity/Assistive Device (Gait Training Goal 1, PT)  gait (walking locomotion);assistive device use;increase endurance/gait distance;walker, rolling  -RA     [place] Level (Gait Training Goal 1, PT)  standby assist;supervision required;conditional independence  -RA     Distance (Gait Goal 1, PT)  250'  -RA     Time Frame (Gait Training Goal 1, PT)  5 days  -RA     Row Name 12/24/18 1100          Positioning and Restraints    Pre-Treatment Position  in bed  -RA     Post Treatment Position  chair  -RA     In Chair  reclined;call light within reach;encouraged to call for assist;with family/caregiver  -RA     Row Name 12/24/18 1100          Living Environment    Home Accessibility  stairs to enter home single step   -RA       User Key  (r) = Recorded By, (t) = Taken By, (c) = Cosigned By    Initials Name Provider Type    Genna Becerril, PT Physical Therapist        Physical Therapy Education     Title: PT OT SLP Therapies (Done)     Topic: Physical Therapy (Done)     Point: Mobility  training (Done)     Learning Progress Summary           Patient Acceptance, E,TB,D, VU,NR by RA at 12/24/2018 11:51 AM   Family Acceptance, E,TB,D, VU,NR by RA at 12/24/2018 11:51 AM                   Point: Home exercise program (Done)     Learning Progress Summary           Patient Acceptance, E,TB,D, VU,NR by RA at 12/24/2018 11:51 AM   Family Acceptance, E,TB,D, VU,NR by RA at 12/24/2018 11:51 AM                   Point: Body mechanics (Done)     Learning Progress Summary           Patient Acceptance, E,TB,D, VU,NR by RA at 12/24/2018 11:51 AM   Family Acceptance, E,TB,D, VU,NR by RA at 12/24/2018 11:51 AM                   Point: Precautions (Done)     Learning Progress Summary           Patient Acceptance, E,TB,D, VU,NR by RA at 12/24/2018 11:51 AM   Family Acceptance, E,TB,D, VU,NR by RA at 12/24/2018 11:51 AM                               User Key     Initials Effective Dates Name Provider Type Discipline    RA 04/06/17 -  Genna Calhoun, PT Physical Therapist PT              PT Recommendation and Plan  Anticipated Discharge Disposition (PT): home with assist, home with home health  Planned Therapy Interventions (PT Eval): home exercise program, gait training, patient/family education, strengthening, transfer training  Therapy Frequency (PT Clinical Impression): daily  Outcome Summary/Treatment Plan (PT)  Anticipated Discharge Disposition (PT): home with assist, home with home health  Outcome Summary: Patient demonstrates impaired functional mobility/gait and will benefit from skilled PT in acute setting to help improve functional safety/independence prior to planned D/C home with assist (per daughter has CG in home and already has HHPT set up for T/TH from prior hospital admit).  Outcome Measures     Row Name 12/24/18 1100             How much help from another person do you currently need...    Turning from your back to your side while in flat bed without using bedrails?  4  -RA      Moving from lying on  back to sitting on the side of a flat bed without bedrails?  4  -RA      Moving to and from a bed to a chair (including a wheelchair)?  3  -RA      Standing up from a chair using your arms (e.g., wheelchair, bedside chair)?  3  -RA      Climbing 3-5 steps with a railing?  3  -RA      To walk in hospital room?  3  -RA      AM-PAC 6 Clicks Score  20  -RA         Functional Assessment    Outcome Measure Options  AM-PAC 6 Clicks Basic Mobility (PT)  -RA        User Key  (r) = Recorded By, (t) = Taken By, (c) = Cosigned By    Initials Name Provider Type    Genna Becerril, PT Physical Therapist         Time Calculation:   PT Charges     Row Name 12/24/18 1145             Time Calculation    Start Time  1115  -RA      Stop Time  1142  -RA      Time Calculation (min)  27 min  -RA      PT Received On  12/24/18  -RA      PT - Next Appointment  12/26/18  -RA        User Key  (r) = Recorded By, (t) = Taken By, (c) = Cosigned By    Initials Name Provider Type    Genna Becerril, PT Physical Therapist        Therapy Suggested Charges     Code   Minutes Charges    None           Therapy Charges for Today     Code Description Service Date Service Provider Modifiers Qty    58616589280 HC PT EVAL MOD COMPLEXITY 1 12/24/2018 Genna Calhoun, PT GP 1    05041503522 HC PT THER PROC EA 15 MIN 12/24/2018 Genna Calhoun, PT GP 1          PT G-Codes  Outcome Measure Options: AM-PAC 6 Clicks Basic Mobility (PT)  AM-PAC 6 Clicks Score: 20      Genna Calhoun PT  12/24/2018

## 2018-12-24 NOTE — CONSULTS
Patient Name: Shaun Brewster  :1930  88 y.o.    Date of Admission: 2018  Date of Consultation:  18  Encounter Provider: Von Diggs MD  Place of Service: [facility] CARDIOLOGY  Referring Provider: Shaun Posadas MD  Patient Care Team:  Andrew Trujillo MD as PCP - General  Andrew Trujillo MD as PCP - Family Medicine  [place], Nancy Rodriguez MD as PCP - Claims Attributed      Chief complaint: abdominal pain and hypotension    History of Present Illness:  Mr. Brewster is a 88 year old gentlemen with a history of permanent atrial fibrillation, premature ventricular contractions, and ESRD.  He is on chronic anticoagulation with warfarin.  He has recently been admitted with diverticulosis and small bowel obstruction.  He has intermittently been on cardizem due to NPO, treated with iv beta blocker.  His CCB is currently being held due to concerns about hypotension.  The patient is currently in atrial fibrillation with acceptable rate control with frequent PVCs.  He denies any symptoms of palpitations or tachycardia.  He feels that he is improving.         Cardiac Testing:  Echocardiogram 18  Interpretation Summary     · Left ventricle not well visualized. Calculated EF = 54%  · Left atrial cavity size is mild-to-moderately dilated.  · There is calcification of the aortic valve.  · Mild-to-moderate mitral valve regurgitation is present  · *Mild dilation of the aortic root is present. Mild dilation of the sinuses of Valsalva is present.  · Mild to moderate tricuspid valve regurgitation is present.  · Right ventricular cavity is borderline dilated.  · There is no evidence of pericardial effusion       Past Medical History:   Diagnosis Date   • Anemia    • 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CMS/HCC)    • Arthritis    • Atypical chest pain    • Blind right eye    • Chronic kidney disease (CKD), stage V (CMS/HCC)    • Dizziness    • Dyslipidemia    • Esophageal reflux     • Esophagitis, reflux    • Femur fracture, right (CMS/HCC)    • Hypertension    • Macular degeneration    • Malignant neoplasm of prostate (CMS/HCC)     gland   • Nephrolithiasis    • Nonspecific abnormal finding    • Paroxysmal atrial fibrillation (CMS/HCC)    • Postnasal drip    • Premature ventricular contractions    • Renal disease    • Throat pain    • Urge incontinence of urine    • Ventricular tachycardia (CMS/HCC)        Past Surgical History:   Procedure Laterality Date   • ARTERIOVENOUS FISTULA Left 2015   • HAND SURGERY Bilateral    • HIP SURGERY     • INGUINAL HERNIA REPAIR     • KNEE SURGERY     • OTHER SURGICAL HISTORY      cardiac cath procedure summary normal, corneal lasik   • REPLACEMENT TOTAL KNEE      left    • SHOULDER SURGERY     • TONSILLECTOMY AND ADENOIDECTOMY           Prior to Admission medications    Medication Sig Start Date End Date Taking? Authorizing Provider   Calcium Acetate 668 (169 Ca) MG tablet Take 2 Units by mouth 3 (Three) Times a Day With Meals. 12/8/18   Andrew Trujillo MD   DILT- MG 24 hr capsule TAKE 1 CAPSULE EVERY NIGHT 12/3/18   Andrew Trujillo MD   famotidine (PEPCID) 40 MG tablet TAKE 1 TABLET DAILY 3/19/18   Andrew Trujillo MD   finasteride (PROSCAR) 5 MG tablet Take 1 tablet by mouth Daily. 12/19/18   Kim Linda MD   HYDROcodone-acetaminophen (NORCO) 5-325 MG per tablet Take 1 tablet by mouth Every 6 (Six) Hours As Needed for Moderate Pain . 12/6/18   Norris Garibay MD   lidocaine (LIDODERM) 5 % Place 2 patches on the skin as directed by provider Daily. Remove & Discard patch within 12 hours or as directed by MD 12/6/18   Norris Garibay MD   ondansetron ODT (ZOFRAN-ODT) 4 MG disintegrating tablet Take 1 tablet by mouth Every 8 (Eight) Hours As Needed for Nausea or Vomiting. 12/21/18   Andrew Trujillo MD   sevelamer (RENVELA) 800 MG tablet Take 800 mg by mouth. 2 tablts 4 times daily    Provider, Historical,  MD   triphrocaps (NEPHROCAPS) 1 MG capsule capsule Take 1 capsule by mouth.    Provider, Historical, MD   warfarin (COUMADIN) 2.5 MG tablet Take 2.5 mg by mouth every other day.    Provider, Historical, MD   warfarin (JANTOVEN) 5 MG tablet TAKE 1 TABLET DAILY OR AS  DIRECTED 12/3/18   Andrew Trujillo MD       Allergies   Allergen Reactions   • Nitrofurantoin    • Amiodarone Dizziness       Social History     Socioeconomic History   • Marital status:      Spouse name: Not on file   • Number of children: Not on file   • Years of education: Not on file   • Highest education level: Not on file   Tobacco Use   • Smoking status: Former Smoker   • Smokeless tobacco: Never Used   Substance and Sexual Activity   • Alcohol use: No   • Drug use: No   • Sexual activity: Defer       Family History   Problem Relation Age of Onset   • Other Brother         acute bacterial endocarditis       REVIEW OF SYSTEMS:   All systems reviewed.  Pertinent positives include nausea, vomiting, and fatigue..  All other systems are negative.      Objective:     Vitals:    12/23/18 1704 12/23/18 1900 12/23/18 2334 12/24/18 0700   BP: 119/62 105/60 99/66 130/80   BP Location: Right arm Right arm Right arm Right arm   Patient Position: Lying Lying Lying Lying   Pulse: 106 99 94 98   Resp: 16 18 18 18   Temp: 97.9 °F (36.6 °C) 97.8 °F (36.6 °C) 97.4 °F (36.3 °C) 97.5 °F (36.4 °C)   TempSrc: Oral Oral Oral Oral   SpO2: 93% 98% 95% 97%   Weight:       Height:         Body mass index is 21.41 kg/m².    General Appearance:    Alert, cooperative, in no acute distress, lying in bed accompanied by wife   Head:    Normocephalic, without obvious abnormality, atraumatic   Eyes:            Lids and lashes normal, conjunctivae and sclerae normal, no   icterus, no pallor, corneas clear   Ears:    Ears appear intact with no abnormalities noted   Throat:   No oral lesions, no thrush, oral mucosa moist   Neck:   No adenopathy, supple, trachea midline,  no thyromegaly, no   carotid bruit, no JVD       Lungs:     Clear to auscultation,respirations regular, even and unlabored    Heart:    Irregular rhythm with frequent ectopy, normal S1 and S2, no murmur, no gallop, no rub, no click   Chest Wall:    No abnormalities observed   Abdomen:     Normal bowel sounds, no masses, normal bowel sounds   Extremities:   Moves all extremities well, no edema, no cyanosis, no redness.  Dialysis fistula in left arm   Pulses:   Pulses palpable and equal bilaterally. Normal radial pulses   Skin:  Psychiatric:   No bleeding, bruising or rash    Alert and oriented x 3, normal mood and affect   Lab Review:     Results from last 7 days   Lab Units  12/23/18   0422  12/22/18   0002   SODIUM mmol/L  138  138   POTASSIUM mmol/L  4.1  4.1   CHLORIDE mmol/L  105  98   CO2 mmol/L  19.8*  23.9   BUN mg/dL  36*  17   CREATININE mg/dL  5.26*  3.49*   CALCIUM mg/dL  8.4*  9.6   BILIRUBIN mg/dL   --   0.7   ALK PHOS U/L   --   111   ALT (SGPT) U/L   --   18   AST (SGOT) U/L   --   19   GLUCOSE mg/dL  84  129*         Results from last 7 days   Lab Units  12/24/18   0912   WBC 10*3/mm3  6.26   HEMOGLOBIN g/dL  8.3*   HEMATOCRIT %  25.2*   PLATELETS 10*3/mm3  202     Results from last 7 days   Lab Units  12/24/18   0357  12/23/18   1902  12/22/18   0002   INR   3.20*  3.43*  3.49*  2.29*            Telemetry:             EKG:          I personally viewed and interpreted the patient's EKG/Telemetry data.        Assessment and Plan:       1.Chronic Atrial Fibrillation  2.Premature Ventricular Contractions  3.Diverticulsis     Atrial fibrillation and premature ventricular contractions are currently asymptomatic.  Rate control is acceptable. Acceptable rate at rest is less than 110 bpm.  May increase above this with light activity.   Ventricular function was recently confirmed to be normal.  It is fine for him to remain off cardizem.  Warfarin has been resumed, goal INR is 2-3.  He may follow-up with   [facility] after discharge.    Von Diggs MD  12/24/18  10:17 AM

## 2018-12-24 NOTE — PROGRESS NOTES
Continued Stay Note  [facility]     Patient Name: Shaun Brewster  MRN: [id number]  Today's Date: 12/24/2018    Admit Date: 12/21/2018    Discharge Plan     Row Name 12/24/18 1258       Plan    Plan  Home with 24/7 caregivers (already in place) and [facility]    Patient/Family in Agreement with Plan  yes    Plan Comments  IMM 12/22/18. Spoke with patient and with permission his daughter Audra and son Henrique. Patient lives with his wife Vivian and they have 24/7 caregivers already in place. Son Henrique says his mother needs more care than the patient but they assist as needed. Per Audra/daughter, patient was supposed to start PT with [facility] but got readmitted. Patient has advanced directives and Audra will get a copy. Patient has been to SNF at [facility]. Pt/family inform they have all equipment he needs. Confirmed face sheet correct. PCP is Dr. Trujillo and his pharmacy is [facility]. Audra Trujillo daughter [phone] and Henrique Brewster son [phone] are his emergency contacts and decision maker if he is not able. Family will provide transportation home at discharge. DC plans are to return home with 24/7 caregivers and start PT with [facility]. Called referral to [facility].         Discharge Codes    No documentation.             Clarisa Gamble RN  Discharge Planning Assessment  [facility]     Patient Name: Shaun Brewster  MRN: [id number]  Today's Date: 12/24/2018    Admit Date: 12/21/2018    Discharge Needs Assessment     Row Name 12/24/18 1255       Living Environment    Lives With  spouse;other (see comments)    Name(s) of Who Lives With Patient  Wife Vivian Brewster and they have 24/7 caregivers     Current Living Arrangements  home/apartment/condo    Primary Care Provided by  homecare agency    Provides Primary Care For  no one, unable/limited ability to care for self    Family Caregiver if Needed  child(sari), adult;other (see comments)    Family Caregiver Names  24/7 caregivers, son and daughter    Quality of  Family Relationships  helpful;involved;supportive    Able to Return to Prior Arrangements  yes       Resource/Environmental Concerns    Resource/Environmental Concerns  none    Transportation Concerns  car, none       Transition Planning    Patient/Family Anticipates Transition to  home with help/services;home with family    Patient/Family Anticipated Services at Transition  home health care    Transportation Anticipated  family or friend will provide       Discharge Needs Assessment    Concerns to be Addressed  basic needs;home safety;care coordination/care conferences;discharge planning    Equipment Currently Used at Home  cane, straight;commode;grab bar;shower chair;walker, rolling;wheelchair    Anticipated Changes Related to Illness  inability to care for self    Equipment Needed After Discharge  none    Outpatient/Agency/Support Group Needs  homecare agency;skilled nursing facility    Discharge Facility/Level of Care Needs  home with home health    Offered/Gave Vendor List  no    Patient's Choice of Community Agency(s)  [facility] for HH and [facility] for SNF    Current Discharge Risk  dependent with mobility/activities of daily living;physical impairment        Discharge Plan     Row Name 12/24/18 1253       Plan    Plan  Home with 24/7 caregivers (already in place) and [facility]    Patient/Family in Agreement with Plan  yes    Plan Comments  IMM 12/22/18.        Destination      No service coordination in this encounter.      Durable Medical Equipment      No service coordination in this encounter.      Dialysis/Infusion      No service coordination in this encounter.      Home Medical Care      Service Provider Request Status Selected Services Address Phone Number Fax Number    [facility] Selected Home Health Services [address] [address] [phone]       Clarisa Gamble RN 12/24/2018 1250    Called to [facility]                 [facility]      No  service coordination in this encounter.          Demographic Summary     Row Name 12/24/18 1253       General Information    Admission Type  inpatient    Arrived From  home    Referral Source  admission list    Reason for Consult  discharge planning    Preferred Language  English     Used During This Interaction  no       Contact Information    Permission Granted to Share Info With  ;family/designee son Henrique and daughter Audra        Functional Status     Row Name 12/24/18 1254       Functional Status    Usual Activity Tolerance  moderate    Current Activity Tolerance  fair       Functional Status, IADL    Medications  assistive person    Meal Preparation  assistive person    Housekeeping  assistive person    Laundry  assistive person    Shopping  assistive person    IADL Comments  Patient and wife have 24/7 caregivers at home already in place. Son Henrique says it's more for the wife as she needs more assistance.       Mental Status    General Appearance WDL  ex       Mental Status Summary    Recent Changes in Mental Status/Cognitive Functioning  memory (recent);memory (remote)        Psychosocial    No documentation.       Abuse/Neglect    No documentation.       Legal    No documentation.       Substance Abuse    No documentation.       Patient Forms    No documentation.           Clarisa aGmble RN

## 2018-12-24 NOTE — PROGRESS NOTES
Follow-up partial small bowel obstruction    Subjective:  Tolerated full liquids without much issue.  Passing flatus but no bowel movement.  Denies any nausea or abdominal pain.    Review of systems:  Respiratory: Patient denies shortness of breath, cough or hemoptysis    Physical exam:  Temperature 97.9 heart rate 88 blood pressure 109/60  Gen.: Awake alert and oriented, no acute distress  Eyes: Extraocular movements intact, no scleral icterus  Gastrointestinal: Abdomen completely soft and benign, nontender, no masses    White blood cell count 6.2, hemoglobin 8.3.  Lipase has normalized to 50.    Assessment and plan:  -Resolving ileus versus partial small bowel obstruction.  Advanced to GI soft diet.  Mobilize as able.  Continue bowel regimen.  -Cholelithiasis, likely asymptomatic, would not recommend cholecystectomy without evidence for cholecystitis.  -Pancreatitis, chemical, improved, does not seem to be biliary in nature to me.    Julio Snowden MD  General and Endoscopic Surgery  [facility]    [address], [address]  [place], [place], [address]  P: [phone]  F: [phone]

## 2018-12-24 NOTE — DISCHARGE SUMMARY
[facility]    ASSOCIATES  [phone]    DISCHARGE SUMMARY  [facility]    Patient Identification:  Name: Shaun Brewster  Age: 88 y.o.  Sex: male  :  1930  MRN: [id number]  Primary Care Physician: Andrew Trujillo MD    Admit date: 2018  Discharge date and time:      Discharge Diagnoses:  Acute biliary pancreatitis without infection or necrosis    Chronic atrial fibrillation (CMS/HCC)    ESRD (end stage renal disease) (CMS/HCC)    Closed fracture of multiple ribs of right side       History of present illness from H&P:    This is an 88-year-old gentleman who has a history of atrial fibrillation, hypertension, end-stage renal disease who was recently admitted to the hospital for small bowel obstruction.  The patient was seen by general surgery treated conservatively with an NG tube and decompression.  The patient's small bowel obstruction resolved without surgical intervention.  The patient was tolerating a regular diet and was discharged.     The patient now comes to the hospital because of groin pain, mild constipation and back pain.  In the emergency room CT scan of the abdomen reveals that the patient has elevated lipase of 127.  Patient is being admitted to the hospital for concern of pancreatitis.      Hospital Course:     The patient was admitted to the hospital for possible pancreatitis.  But the patient's CT scan does not show pancreatitis.  My suspicion for pancreatitis was low.  The patient is eating tolerating by mouth well.  The diet was advanced slowly.  Patient is tolerated dialysis here in the hospital.  I discussed with surgery and there okay with the patient being discharged.  I discussed with nephrology and the patient is to go to dialysis on Wednesday.  Modifications to possibly help the patient include taking him off of his Cardizem as his blood pressures here in the hospital been low.  His blood pressures off Cardizem have improved.  As  well as stopping revella which can also be constipating.  The patient will try some fiber as well.      Further hospital course by problem list:    Acute biliary pancreatitis without infection or necrosis (seems less likely at this point); he is tolerating po well, lipase was relatively low, no evidence on CT scan and his pain was not epigastric  -fluids at 75 cc per hour, will stop given he on dialysis and tolerating po    -u/s shows cholelithiasis       Chronic atrial fibrillation (CMS/[facility])  -bp is now low and having to hold on diltiazem so will ask cardiology to see  -restart coumadin, d/w surgery   -Up INRs outpatient      ESRD (end stage renal disease) (CMS/[facility])  -nephrology to see       Closed fracture of multiple ribs of right side by xray 12/6-no complaints today     Hypotension-fluids, hold cardizem     H/o htn     Leukocytosis-check procal in the am, temp curve is near normal, just minimally elevated   -procal is elevated but difficult to interpret in the setting of ESRD      Anemia- will need follow up on this, will likely need transfusion; epo and iron given here in the hospital  Consults:   Consults     Date and Time Order Name Status Description    12/23/2018 1833 Inpatient Cardiology Consult      12/22/2018 1011 Inpatient General Surgery Consult Completed     12/22/2018 1011 Inpatient Nephrology Consult      12/22/2018 0504 Inpatient Gastroenterology Consult      12/22/2018 0319 [facility] (on-call MD unless specified) Completed     12/9/2018 1109 Inpatient Cardiology Consult Completed     12/9/2018 1054 Inpatient Pulmonology Consult Completed     12/9/2018 0649 Inpatient General Surgery Consult Completed     12/9/2018 0507 Surgery (on-call MD unless specified) Completed     12/9/2018 0504 [facility] (on-call MD unless specified) Completed           Results from last 7 days   Lab Units  12/23/18   0422   WBC 10*3/mm3  10.44   HEMOGLOBIN g/dL  7.8*   HEMATOCRIT %  25.3*   PLATELETS 10*3/mm3  190       Results from  last 7 days   Lab Units  12/23/18   0422   SODIUM mmol/L  138   POTASSIUM mmol/L  4.1   CHLORIDE mmol/L  105   CO2 mmol/L  19.8*   BUN mg/dL  36*   CREATININE mg/dL  5.26*   GLUCOSE mg/dL  84   CALCIUM mg/dL  8.4*       Significant Diagnostic Studies:   Lab Results   Component Value Date    WBC 6.26 12/24/2018    HGB 8.3 (L) 12/24/2018    HCT 25.2 (L) 12/24/2018     12/24/2018     Lab Results   Component Value Date     12/23/2018    K 4.1 12/23/2018     12/23/2018    CO2 19.8 (L) 12/23/2018    BUN 36 (H) 12/23/2018    CREATININE 5.26 (H) 12/23/2018    GLUCOSE 84 12/23/2018     Lab Results   Component Value Date    CALCIUM 8.4 (L) 12/23/2018    PHOS 4.5 12/23/2018     Lab Results   Component Value Date    AST 19 12/22/2018    ALT 18 12/22/2018    ALKPHOS 111 12/22/2018     Lab Results   Component Value Date    INR 3.20 (H) 12/24/2018     No results found for: COLORU, CLARITYU, SPECGRAV, PHUR, PROTEINUR, GLUCOSEU, KETONESU, BLOODU, NITRITE, LEUKOCYTESUR, BILIRUBINUR, UROBILINOGEN, RBCUA, WBCUA, BACTERIA, UACOMMENT  No results found for: TROPONINT, TROPONINI, BNP  No components found for: HGBA1C;2  No components found for: TSH;2    Imaging Results (all)     Procedure Component Value Units Date/Time    US Gallbladder [[id number]] Collected:  12/22/18 1651     Updated:  12/22/18 1657    Narrative:       US GALLBLADDER-     INDICATIONS: Abdominal pain     TECHNIQUE: Ultrasound of the right upper quadrant     COMPARISON: CT from 12/20/2018     FINDINGS:     The gallbladder is nontender, but contains shadowing stones. No  gallbladder wall thickening or pericholecystic fluid. Suggestion of  small adenomyomatosis of the anterior gallbladder wall.     No intrahepatic or extrahepatic biliary ductal dilatation is  demonstrated. The common biliary duct caliber is measured at 4 mm.     Multiple hepatic cysts are demonstrated, as large as 5.6 cm. Diffusely,  the echogenicity of the liver is otherwise in the range  of normal  relative to that of the right kidney.     The pancreas is obscured.     The right kidney, 8.9 cm cm, is atrophied and poorly distinguished, with  small cysts apparent.          Impression:          Cholelithiasis. No evidence for acute cholecystitis. With persistent  clinical indication, hepatobiliary scintigraphy could be considered for  further evaluation.           This report was finalized on 12/22/2018 4:54 PM by Dr. Mike Hernandez M.D.       CT Abdomen Pelvis Without Contrast [[id number]] Collected:  12/22/18 0126     Updated:  12/22/18 0133    Narrative:       NONCONTRAST CT SCANS ABDOMEN AND PELVIS     HISTORY: abdominal pain with prior SBO now increasing leukocytosis and  elevated lipase     COMPARISON: December 9, 2018.     TECHNIQUE:Radiation dose reduction techniques were utilized, including  automated exposure control and exposure modulation based on body size.   Axial images were obtained from the lung bases to the symphysis pubis  without oral or IV contrast per request.      FINDINGS:  Right pleural effusion is stable. Uncomplicated  cholelithiasis again noted. Innumerable low-density renal and hepatic  lesions, likely cysts, are stable considering lack of IV contrast. There  is marked renal atrophy. Renal calculi bilaterally are largely vascular  in appearance. Regardless there is no hydronephrosis. Remaining solid  organs are otherwise unremarkable without benefit of IV contrast.  Encompassed aorta is non-aneurysmal. Normal appendix. Innumerable  colonic diverticuli, particularly in the sigmoid colon. Small bowel  dilatation has improved from previous. Maximum diameter is now  approximately 3.5 cm, previously 4.3 cm. These findings may be related  to improving obstruction or ileus. Prior right inguinal hernia repair  again noted.          Impression:       1.  Stable right pleural effusion.  2. Stable hepatic and renal findings.  3. Extensive diverticulosis.  4. Diminishing small  bowel dilatation which may be related to improving  obstruction or ileus.  5. Uncomplicated cholelithiasis              This report was finalized on 12/22/2018 1:30 AM by Jorge Allen M.D.       XR Abdomen KUB [[id number]] Collected:  12/22/18 0026     Updated:  12/22/18 0032    Narrative:       SINGLE VIEW ABDOMEN/KUB     HISTORY:abdominal pain with recent SBO     COMPARISON: December 14, 2018.     FINDINGS: 2 crosswise images, one each over the upper and lower abdomen  and pelvis were obtained. The nasogastric tube has been removed.      Small bowel dilatation has diminished slightly. There still is a fairly  large amount of bowel gas present throughout the abdomen and upper  pelvis. These findings may be related to improving obstruction or ileus.  No obvious pneumoperitoneum although portable supine imaging not optimal  for detection of pneumoperitoneum..  Degenerative changes present in the  thoracolumbar spine. Postsurgical changes are present in the proximal  right femur. Metallic coils over the right pelvis likely related to  hernia repair.  Rounded pelvic calculi likely phleboliths.               Impression:       1. Slight decrease in small bowel dilatation which may be related to  improving obstruction or ileus.    .     This report was finalized on 12/22/2018 12:28 AM by Jorge Allen M.D.         No results found for: SITE, ALLENTEST, PHART, SNA0LYN, PO2ART, EDA6BWR, BASEEXCESS, E7WFJFFS, HGBBG, HCTABG, OXYHEMOGLOBI, METHHGBN, CARBOXYHGB, CO2CT, BAROMETRIC, MODALITY, FIO2       Discharge Medications      New Medications      Instructions Start Date   acetaminophen 325 MG tablet  Commonly known as:  TYLENOL   650 mg, Oral, Every 4 Hours PRN      polyethylene glycol packet  Commonly known as:  MIRALAX   17 g, Oral, Every 3 Days         Changes to Medications      Instructions Start Date   warfarin 2.5 MG tablet  Commonly known as:  COUMADIN  What changed:  Another medication with the same name was  removed. Continue taking this medication, and follow the directions you see here.   2.5 mg, Oral, Every Other Day         Continue These Medications      Instructions Start Date   Calcium Acetate 668 (169 Ca) MG tablet   2 Units, Oral, 3 Times Daily With Meals      famotidine 40 MG tablet  Commonly known as:  PEPCID   TAKE 1 TABLET DAILY      finasteride 5 MG tablet  Commonly known as:  PROSCAR   5 mg, Oral, Daily      HYDROcodone-acetaminophen 5-325 MG per tablet  Commonly known as:  NORCO   1 tablet, Oral, Every 6 Hours PRN      lidocaine 5 %  Commonly known as:  LIDODERM   2 patches, Transdermal, Every 24 Hours, Remove & Discard patch within 12 hours or as directed by MD      ondansetron ODT 4 MG disintegrating tablet  Commonly known as:  ZOFRAN-ODT   4 mg, Oral, Every 8 Hours PRN      triphrocaps 1 MG capsule capsule   1 capsule, Oral         Stop These Medications    DILT- MG 24 hr capsule  Generic drug:  diltiazem XR     sevelamer 800 MG tablet  Commonly known as:  RENVELA              Patient Instructions:       Future Appointments   Date Time Provider Department Center   1/2/2019  2:20 PM Andrew Trujillo MD [facility] EPT None   1/3/2019  2:20 PM Niranjan Ponce MD [facility] None            Discharge Order (From admission, onward)    None            TEST  RESULTS PENDING AT DISCHARGE   Order Current Status    Blood Culture - Blood, Arm, Right Preliminary result    Blood Culture - Blood, Blood, Venous Line Preliminary result            Discharge instructions:  Follow up with your primary care provider in 1-2 weeks with a cbc and cmp     Dialysis is Wednesday    Patient monitors INRs at home and will need to monitor closely at home      Also note per ct form 12/9/18 (prior to the hospitalization)  IMPRESSION :   1. Lower lobe opacities with small right pleural effusion.  2. Bilateral renal atrophy with innumerable renal and hepatic lesions  felt to reflect cysts. Several are too small for  detailed assessment.  Recommend follow-up.  3. Extensive colonic diverticulosis.  4. Moderate distention of the majority of the small bowel, obstruction  or partial obstruction is not excluded.  5. Uncomplicated cholelithiasis      Total time spent discharging patient including evaluation, post hospitalization follow up,  medication and post hospitalization instructions and education, total time exceeds 30 minutes.    Signed:  Kris Davila MD  12/24/2018  8:46 AM

## 2018-12-24 NOTE — DISCHARGE PLACEMENT REQUEST
"Ruba Brewster (88 y.o. Male)     Date of Birth Social Security Number Address Home Phone MRN    05/29/1930  [address] [address] [phone] [id number]    [employer] Marital Status          [employer]        Admission Date Admission Type Admitting Provider Attending Provider Department, Room/Bed    12/21/18 Emergency Kris Davila MD Edling, Stephen A, MD [address], [address]    Discharge Date Discharge Disposition Discharge Destination                       Attending Provider:  Kris Davila MD    Allergies:  Nitrofurantoin, Amiodarone    Isolation:  None   Infection:  None   Code Status:  CPR    Ht:  185.4 cm (73\")   Wt:  73.6 kg (162 lb 4.8 oz)    Admission Cmt:  None   Principal Problem:  None                Active Insurance as of 12/21/2018     Primary Coverage     Payor Plan Insurance Group Employer/Plan Group    MEDICARE MEDICARE A & B      Payor Plan Address Payor Plan Phone Number Payor Plan Fax Number Effective Dates    [address] [phone]  5/1/1995 - None Entered    [facility] [id number]       Subscriber Name Subscriber Birth Date Member ID       RUBA BREWSTER 5/29/1930 [id number]           Secondary Coverage     Payor Plan Insurance Group Employer/Plan Group    AARP MED SUPP AARP HEALTH CARE OPTIONS PLAN F     Payor Plan Address Payor Plan Phone Number Payor Plan Fax Number Effective Dates    [facility] [phone]  1/1/2015 - None Entered    [address]       [facility] [id number]       Subscriber Name Subscriber Birth Date Member ID       RUBA BREWSTER 5/29/1930 [id number]                 Emergency Contacts      (Rel.) Home Phone Work Phone Mobile Phone    grupo cerda (Daughter) -- -- [phone]    BrewsterHenrique (Son) [phone] -- [phone]    Vivian Brewster (Spouse) [phone] -- --              "

## 2018-12-25 ENCOUNTER — READMISSION MANAGEMENT (OUTPATIENT)
Dept: CALL CENTER | Facility: HOSPITAL | Age: 83
End: 2018-12-25

## 2018-12-25 NOTE — OUTREACH NOTE
Prep Survey      Responses   Facility patient discharged from?  [facility]   Is patient eligible?  Yes   Discharge diagnosis  Acute biliary pancreatitis without infection or necrosis   Does the patient have one of the following disease processes/diagnoses(primary or secondary)?  Other   Does the patient have Home health ordered?  Yes   What is the Home health agency?   Home with 24/7 caregivers (already in place)   Is there a DME ordered?  No   Comments regarding appointments  see avs   General alerts for this patient  dialysis pt. has 24/7 care givers    Prep survey completed?  Yes          Mónica Lozada RN

## 2018-12-26 NOTE — PROGRESS NOTES
Case Management Discharge Note    Final Note: Pt discharged home with [facility]    Destination      No service has been selected for the patient.      Durable Medical Equipment      No service has been selected for the patient.      Dialysis/Infusion      No service has been selected for the patient.      Home Medical Care      Service Provider Request Status Selected Services Address Phone Number Fax Number    [facility] [address] [address] [phone]       Clarisa Gamble RN 12/24/2018 1253    Called to [facility]                 [facility]      No service has been selected for the patient.        Other: Other(private auto)    Final Discharge Disposition Code: 06 - home with home health care

## 2018-12-27 LAB
BACTERIA SPEC AEROBE CULT: NORMAL
BACTERIA SPEC AEROBE CULT: NORMAL

## 2018-12-28 ENCOUNTER — READMISSION MANAGEMENT (OUTPATIENT)
Dept: CALL CENTER | Facility: HOSPITAL | Age: 83
End: 2018-12-28

## 2018-12-28 NOTE — OUTREACH NOTE
Medical Week 1 Survey      Responses   Facility patient discharged from?  [place]   Does the patient have one of the following disease processes/diagnoses(primary or secondary)?  Other   Is there a successful TCM telephone encounter documented?  No   Week 1 attempt successful?  Yes   Call start time  1519   Call end time  1521   General alerts for this patient  dialysis pt. has 24/7 care givers    Discharge diagnosis  Acute biliary pancreatitis without infection or necrosis   Is patient permission given to speak with other caregiver?  Yes   List who call center can speak with  Caregivers   Meds reviewed with patient/caregiver?  Yes   Is the patient having any side effects they believe may be caused by any medication additions or changes?  No   Does the patient have all medications ordered at discharge?  Yes   Is the patient taking all medications as directed (includes completed medication regime)?  Yes   Does the patient have a primary care provider?   Yes   Does the patient have an appointment with their PCP within 7 days of discharge?  Yes   Has the patient kept scheduled appointments due by today?  Yes   What is the Home health agency?   Home with 24/7 caregivers (already in place)   Has home health visited the patient within 72 hours of discharge?  N/A   Psychosocial issues?  No   Did the patient receive a copy of their discharge instructions?  Yes   Nursing interventions  Reviewed instructions with patient   What is the patient's perception of their health status since discharge?  Improving   Is the patient/caregiver able to teach back signs and symptoms related to disease process for when to call PCP?  Yes   Is the patient/caregiver able to teach back signs and symptoms related to disease process for when to call 911?  Yes   Is the patient/caregiver able to teach back the hierarchy of who to call/visit for symptoms/problems? PCP, Specialist, Home health nurse, Urgent Care, ED, 911  Yes   Week 1 call  completed?  Yes          Jackie Lui RN

## 2019-01-02 ENCOUNTER — TELEPHONE (OUTPATIENT)
Dept: SPORTS MEDICINE | Facility: CLINIC | Age: 84
End: 2019-01-02

## 2019-01-02 NOTE — TELEPHONE ENCOUNTER
[place] [facility] [place] was checked on 12/30/2018. INR 5.2

## 2019-01-03 ENCOUNTER — OFFICE VISIT (OUTPATIENT)
Dept: CARDIOLOGY | Facility: CLINIC | Age: 84
End: 2019-01-03

## 2019-01-03 VITALS
HEIGHT: 73 IN | BODY MASS INDEX: 23.25 KG/M2 | WEIGHT: 175.4 LBS | DIASTOLIC BLOOD PRESSURE: 70 MMHG | HEART RATE: 99 BPM | SYSTOLIC BLOOD PRESSURE: 120 MMHG

## 2019-01-03 DIAGNOSIS — I48.20 CHRONIC ATRIAL FIBRILLATION (HCC): Primary | ICD-10-CM

## 2019-01-03 DIAGNOSIS — I47.20 VENTRICULAR TACHYCARDIA (HCC): ICD-10-CM

## 2019-01-03 DIAGNOSIS — I77.810 AORTIC ROOT DILATATION (HCC): ICD-10-CM

## 2019-01-03 DIAGNOSIS — N18.5 RENAL FAILURE, CHRONIC, STAGE 5 (HCC): ICD-10-CM

## 2019-01-03 PROCEDURE — 93000 ELECTROCARDIOGRAM COMPLETE: CPT | Performed by: INTERNAL MEDICINE

## 2019-01-03 PROCEDURE — 99214 OFFICE O/P EST MOD 30 MIN: CPT | Performed by: INTERNAL MEDICINE

## 2019-01-03 RX ORDER — DILTIAZEM HYDROCHLORIDE 120 MG/1
120 CAPSULE, COATED, EXTENDED RELEASE ORAL DAILY
Qty: 30 CAPSULE | Refills: 11 | Status: SHIPPED | OUTPATIENT
Start: 2019-01-03 | End: 2020-02-23 | Stop reason: SDUPTHER

## 2019-01-03 NOTE — PROGRESS NOTES
Date of Office Visit: 19  Encounter Provider: Niranjan Ponce MD  Place of Service: [facility] CARDIOLOGY  Patient Name: Shaun Brewster  :1930  Referral Provider:No ref. provider found      No chief complaint on file.    History of Present Illness   He is a very pleasant 88-year-old gentleman with history of PVCs, nonsustained ventricular arrhythmias, and normal cardiac catheterization.  He then had paroxysmal atrial fibrillation but could not tolerate amiodarone.  Ultimately he went into atrial fibrillation chronically but is asymptomatic.  He was admitted to the hospital 2018 with what ended up being a small bowel obstruction.  Prolonged NG placement ultimately resolved and was discharged.  During that hospitalization he had a repeat echocardiogram that showed normal LV function mild aortic root enlargement.  He was tachycardic hypotensive alternate was taken off his Cartia and was not discharged on that.  He also had an equivocal troponin most likely due to the tachycardia and hypotension and renal failure  He comes in for followup. The patient denies chest pain, pressure and heaviness. No shortness of breath, othopnea or PND. No palpitations, near syncope or syncope. No stroke type symptoms like paralysis, paresthesia, abrupt vision loss and dysarthria. No bleeding like blood in the stool or dark stools.  Believe he still little week.  He's doing much better.      Atrial Fibrillation   Symptoms are negative for dizziness and weakness. Past medical history includes atrial fibrillation.         Past Medical History:   Diagnosis Date   • Anemia    • 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CMS/HCC)    • Arthritis    • Atypical chest pain    • Blind right eye    • Chronic kidney disease (CKD), stage V (CMS/HCC)    • Dizziness    • Dyslipidemia    • Esophageal reflux    • Esophagitis, reflux    • Femur fracture, right (CMS/HCC)    • Hypertension    • Macular degeneration    •  Malignant neoplasm of prostate (CMS/HCC)     gland   • Nephrolithiasis    • Nonspecific abnormal finding    • Paroxysmal atrial fibrillation (CMS/HCC)    • Postnasal drip    • Premature ventricular contractions    • Renal disease    • Throat pain    • Urge incontinence of urine    • Ventricular tachycardia (CMS/HCC)          Past Surgical History:   Procedure Laterality Date   • ARTERIOVENOUS FISTULA Left 2015   • HAND SURGERY Bilateral    • HIP SURGERY     • INGUINAL HERNIA REPAIR     • KNEE SURGERY     • OTHER SURGICAL HISTORY      cardiac cath procedure summary normal, corneal lasik   • REPLACEMENT TOTAL KNEE      left    • SHOULDER SURGERY     • TONSILLECTOMY AND ADENOIDECTOMY           Current Outpatient Medications on File Prior to Visit   Medication Sig Dispense Refill   • acetaminophen (TYLENOL) 325 MG tablet Take 2 tablets by mouth Every 4 (Four) Hours As Needed for Mild Pain .     • Calcium Acetate 668 (169 Ca) MG tablet Take 2 Units by mouth 3 (Three) Times a Day With Meals. 180 tablet 11   • famotidine (PEPCID) 40 MG tablet TAKE 1 TABLET DAILY 90 tablet 3   • finasteride (PROSCAR) 5 MG tablet Take 1 tablet by mouth Daily. 30 tablet 0   • HYDROcodone-acetaminophen (NORCO) 5-325 MG per tablet Take 1 tablet by mouth Every 6 (Six) Hours As Needed for Moderate Pain . 15 tablet 0   • lidocaine (LIDODERM) 5 % Place 2 patches on the skin as directed by provider Daily. Remove & Discard patch within 12 hours or as directed by MD 15 each 0   • ondansetron ODT (ZOFRAN-ODT) 4 MG disintegrating tablet Take 1 tablet by mouth Every 8 (Eight) Hours As Needed for Nausea or Vomiting. 30 tablet 0   • polyethylene glycol (MIRALAX) packet Take 17 g by mouth Every 3 (Three) Days.     • triphrocaps (NEPHROCAPS) 1 MG capsule capsule Take 1 capsule by mouth.     • warfarin (COUMADIN) 2.5 MG tablet Take 2.5 mg by mouth every other day.       No current facility-administered medications on file prior to visit.          Social  History     Socioeconomic History   • Marital status:      Spouse name: Not on file   • Number of children: Not on file   • Years of education: Not on file   • Highest education level: Not on file   Social Needs   • Financial resource strain: Not on file   • Food insecurity - worry: Not on file   • Food insecurity - inability: Not on file   • Transportation needs - medical: Not on file   • Transportation needs - non-medical: Not on file   Occupational History   • Not on file   Tobacco Use   • Smoking status: Former Smoker   • Smokeless tobacco: Never Used   Substance and Sexual Activity   • Alcohol use: No   • Drug use: No   • Sexual activity: Defer   Other Topics Concern   • Not on file   Social History Narrative   • Not on file       Family History   Problem Relation Age of Onset   • Other Brother         acute bacterial endocarditis         Review of Systems   Constitution: Positive for malaise/fatigue. Negative for decreased appetite, diaphoresis, fever, weakness, weight gain and weight loss.   HENT: Negative for congestion, hearing loss, nosebleeds and tinnitus.    Eyes: Negative for blurred vision, double vision, vision loss in left eye, vision loss in right eye and visual disturbance.   Cardiovascular:        As noted in HPI   Respiratory:        As noted HPI   Endocrine: Negative for cold intolerance and heat intolerance.   Hematologic/Lymphatic: Negative for bleeding problem. Does not bruise/bleed easily.   Skin: Negative for color change, flushing, itching and rash.   Musculoskeletal: Negative for arthritis, back pain, joint pain, joint swelling, muscle weakness and myalgias.   Gastrointestinal: Negative for bloating, abdominal pain, constipation, diarrhea, dysphagia, heartburn, hematemesis, hematochezia, melena, nausea and vomiting.   Genitourinary: Negative for bladder incontinence, dysuria, frequency, nocturia and urgency.   Neurological: Negative for dizziness, focal weakness, headaches,  "light-headedness, loss of balance, numbness, paresthesias and vertigo.   Psychiatric/Behavioral: Negative for depression, memory loss and substance abuse.       Procedures      ECG 12 Lead  Date/Time: 1/3/2019 2:55 PM  Performed by: Niranjan Ponce MD  Authorized by: Niranjan Ponce MD   Comparison: compared with previous ECG   Comparison to previous ECG: HR increased.  Rhythm: atrial fibrillation  Rate: normal  QRS axis: normal                  Objective:    /70 (BP Location: Right arm)   Ht 185.4 cm (73\")   Wt 79.6 kg (175 lb 6.4 oz)   BMI 23.14 kg/m²        Physical Exam  Physical Exam   Constitutional: He is oriented to person, place, and time. He appears well-developed. No distress.   Appears frail   HENT:   Head: Normocephalic.   Eyes: Conjunctivae are normal. Pupils are equal, round, and reactive to light. No scleral icterus.   Neck: Normal carotid pulses, no hepatojugular reflux and no JVD present. Carotid bruit is not present. No tracheal deviation, no edema and no erythema present. No thyromegaly present.   Cardiovascular: S1 normal, S2 normal, normal heart sounds and intact distal pulses. An irregularly irregular rhythm present.  No extrasystoles are present. Tachycardia present. PMI is not displaced. Exam reveals no gallop, no distant heart sounds and no friction rub.   No murmur heard.  Pulses:       Carotid pulses are 2+ on the right side, and 2+ on the left side.       Radial pulses are 2+ on the right side, and 2+ on the left side.        Femoral pulses are 2+ on the right side, and 2+ on the left side.       Dorsalis pedis pulses are 2+ on the right side, and 2+ on the left side.        Posterior tibial pulses are 2+ on the right side, and 2+ on the left side.   Pulmonary/Chest: Effort normal and breath sounds normal. No respiratory distress. He has no decreased breath sounds. He has no wheezes. He has no rhonchi. He has no rales. He exhibits no tenderness.   Abdominal: Soft. Bowel " sounds are normal. He exhibits no distension and no mass. There is no hepatosplenomegaly. There is no tenderness. There is no rebound and no guarding.   Musculoskeletal: He exhibits no edema, tenderness or deformity.   Neurological: He is alert and oriented to person, place, and time.   Skin: Skin is warm and dry. No rash noted. He is not diaphoretic. No cyanosis or erythema. No pallor. Nails show no clubbing.   Psychiatric: He has a normal mood and affect. His speech is normal and behavior is normal. Judgment and thought content normal.           Assessment:   1. This is an 88-year-old with a history of PVCs, nonsustained ventricular tachycardia, normal LV function, and normal cardiac catheterization. Continue the same. He will see us  in follow up in 3 months.  2. Chronic Atrial fibrillation, in atrial fibrillation. He could not tolerate amiodarone.   Atrial Fibrillation and Atrial Flutter  Assessment  • The patient has paroxysmal atrial fibrillation  • This is non-valvular in etiology  • The patient's CHADS2-VASc score is 3  • A [id number] score of 2 or more is considered a high risk for a thromboembolic event  • Warfarin prescribed     Plan  • Continue in atrial fibrillation with rate control  • Continue warfarin for antithrombotic therapy, bleeding issues discussed  • Continue diltiazem for rate control  His heart rate is increased blood pressure appears better and would like to try to get him started back on the Cartia will start that at his home dose of 120 mg a day he's to call us if he has any problems and if stable will see us in 3 months.     3. Dilated aortic root.  Echocardiogram December 2018 normal LV systolic function no real significant valvular disease and aortic root is mildly enlarged.  Would be conservative and not really a candidate for surgical repair.  4. Renal Failure. Now on dialysis.           Plan:

## 2019-01-06 ENCOUNTER — READMISSION MANAGEMENT (OUTPATIENT)
Dept: CALL CENTER | Facility: HOSPITAL | Age: 84
End: 2019-01-06

## 2019-01-06 NOTE — OUTREACH NOTE
Medical Week 2 Survey      Responses   Facility patient discharged from?  [place]   Does the patient have one of the following disease processes/diagnoses(primary or secondary)?  Other   Week 2 attempt successful?  Yes   Call start time  1730   Call end time  1733   List who call center can speak with  Caregivers   Person spoke with today (if not patient) and relationship  caregiver,    Meds reviewed with patient/caregiver?  Yes   Is the patient taking all medications as directed (includes completed medication regime)?  Yes   Comments regarding appointments  has been to PCP   Has the patient kept scheduled appointments due by today?  Yes   Comments  uses walker and wheel chair, 24 hour care, wife sleeps in hosptial bed, he is confused sleeping alot   What is the patient's perception of their health status since discharge?  Same   Week 2 Call Completed?  Yes   Wrap up additional comments  slept alot today, confused          Mónica Lozada RN

## 2019-01-14 ENCOUNTER — READMISSION MANAGEMENT (OUTPATIENT)
Dept: CALL CENTER | Facility: HOSPITAL | Age: 84
End: 2019-01-14

## 2019-01-14 NOTE — OUTREACH NOTE
Medical Week 3 Survey      Responses   Facility patient discharged from?  [facility]   Does the patient have one of the following disease processes/diagnoses(primary or secondary)?  Other   Week 3 attempt successful?  No   Unsuccessful attempts  Attempt 1   Rescheduled  Revoked   Revoke  Decline to participate          Mónica Moore RN

## 2019-01-22 DIAGNOSIS — R41.0 DISORIENTATION: Primary | ICD-10-CM

## 2019-01-31 ENCOUNTER — HOSPITAL ENCOUNTER (OUTPATIENT)
Dept: CT IMAGING | Facility: HOSPITAL | Age: 84
Discharge: HOME | End: 2019-01-31
Admitting: FAMILY MEDICINE

## 2019-01-31 DIAGNOSIS — R41.0 DISORIENTATION: ICD-10-CM

## 2019-01-31 PROCEDURE — 70450 CT HEAD/BRAIN W/O DYE: CPT

## 2019-03-09 ENCOUNTER — DOCUMENTATION (OUTPATIENT)
Dept: SPORTS MEDICINE | Facility: CLINIC | Age: 84
End: 2019-03-09

## 2019-03-09 RX ORDER — FAMOTIDINE 40 MG/1
40 TABLET, FILM COATED ORAL DAILY
Qty: 90 TABLET | Refills: 3 | Status: SHIPPED | OUTPATIENT
Start: 2019-03-09 | End: 2019-03-11 | Stop reason: SDUPTHER

## 2019-03-11 RX ORDER — FAMOTIDINE 40 MG/1
TABLET, FILM COATED ORAL
Qty: 90 TABLET | Refills: 3 | Status: SHIPPED | OUTPATIENT
Start: 2019-03-11 | End: 2020-02-23 | Stop reason: SDUPTHER

## 2019-04-25 ENCOUNTER — OFFICE VISIT (OUTPATIENT)
Dept: CARDIOLOGY | Facility: CLINIC | Age: 84
End: 2019-04-25

## 2019-04-25 VITALS
WEIGHT: 180.2 LBS | HEART RATE: 83 BPM | SYSTOLIC BLOOD PRESSURE: 130 MMHG | DIASTOLIC BLOOD PRESSURE: 70 MMHG | HEIGHT: 74 IN | BODY MASS INDEX: 23.13 KG/M2

## 2019-04-25 DIAGNOSIS — I77.810 AORTIC ROOT DILATATION (HCC): ICD-10-CM

## 2019-04-25 DIAGNOSIS — N18.5 RENAL FAILURE, CHRONIC, STAGE 5 (HCC): ICD-10-CM

## 2019-04-25 DIAGNOSIS — I48.20 CHRONIC ATRIAL FIBRILLATION (HCC): Primary | ICD-10-CM

## 2019-04-25 DIAGNOSIS — I47.20 VENTRICULAR TACHYCARDIA (HCC): ICD-10-CM

## 2019-04-25 PROCEDURE — 99213 OFFICE O/P EST LOW 20 MIN: CPT | Performed by: INTERNAL MEDICINE

## 2019-04-25 PROCEDURE — 93000 ELECTROCARDIOGRAM COMPLETE: CPT | Performed by: INTERNAL MEDICINE

## 2019-04-25 NOTE — PROGRESS NOTES
Date of Office Visit: 19  Encounter Provider: Niranjan Ponce MD  Place of Service: [facility] CARDIOLOGY  Patient Name: Shaun Brewster  :1930  Referral Provider:Andrew Trujillo*      No chief complaint on file.    History of Present Illness   He is a very pleasant 88-year-old gentleman with history of PVCs, nonsustained ventricular arrhythmias, and normal cardiac catheterization.  He then had paroxysmal atrial fibrillation but could not tolerate amiodarone.  Ultimately he went into atrial fibrillation chronically but is asymptomatic.  He was admitted to the hospital 2018 with what ended up being a small bowel obstruction.  Prolonged NG placement ultimately resolved and was discharged.  During that hospitalization he had a repeat echocardiogram that showed normal LV function mild aortic root enlargement.  He was tachycardic hypotensive alternate was taken off his Cartia and was not discharged on that.  He also had an equivocal troponin most likely due to the tachycardia and hypotension and renal failure  He comes in for followup. The patient denies chest pain, pressure and heaviness. No shortness of breath, othopnea or PND. No palpitations, near syncope or syncope. No stroke type symptoms like paralysis, paresthesia, abrupt vision loss and dysarthria. No bleeding like blood in the stool or dark stools.  He does have a caregiver around-the-clock at home with he and his wife and overall he seems to be doing much better.      Atrial Fibrillation   Symptoms are negative for dizziness and weakness. Past medical history includes atrial fibrillation.         Past Medical History:   Diagnosis Date   • Anemia    • 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CMS/HCC)    • Arthritis    • Atypical chest pain    • Blind right eye    • Chronic kidney disease (CKD), stage V (CMS/HCC)    • Dizziness    • Dyslipidemia    • Esophageal reflux    • Esophagitis, reflux    • Femur fracture, right  (CMS/HCC)    • Hypertension    • Macular degeneration    • Malignant neoplasm of prostate (CMS/HCC)     gland   • Nephrolithiasis    • Nonspecific abnormal finding    • Paroxysmal atrial fibrillation (CMS/HCC)    • Postnasal drip    • Premature ventricular contractions    • Renal disease    • Throat pain    • Urge incontinence of urine    • Ventricular tachycardia (CMS/HCC)          Past Surgical History:   Procedure Laterality Date   • ARTERIOVENOUS FISTULA Left 2015   • HAND SURGERY Bilateral    • HIP SURGERY     • INGUINAL HERNIA REPAIR     • KNEE SURGERY     • OTHER SURGICAL HISTORY      cardiac cath procedure summary normal, corneal lasik   • REPLACEMENT TOTAL KNEE      left    • SHOULDER SURGERY     • TONSILLECTOMY AND ADENOIDECTOMY           Current Outpatient Medications on File Prior to Visit   Medication Sig Dispense Refill   • acetaminophen (TYLENOL) 325 MG tablet Take 2 tablets by mouth Every 4 (Four) Hours As Needed for Mild Pain .     • Calcium Acetate 668 (169 Ca) MG tablet Take 2 Units by mouth 3 (Three) Times a Day With Meals. 180 tablet 11   • diltiaZEM CD (CARDIZEM CD) 120 MG 24 hr capsule Take 1 capsule by mouth Daily. 30 capsule 11   • famotidine (PEPCID) 40 MG tablet TAKE 1 TABLET DAILY 90 tablet 3   • finasteride (PROSCAR) 5 MG tablet Take 1 tablet by mouth Daily. 30 tablet 0   • lidocaine (LIDODERM) 5 % Place 2 patches on the skin as directed by provider Daily. Remove & Discard patch within 12 hours or as directed by MD 15 each 0   • Multiple Vitamins-Minerals (PRESERVISION AREDS PO) Take  by mouth 2 (Two) Times a Day.     • ondansetron ODT (ZOFRAN-ODT) 4 MG disintegrating tablet Take 1 tablet by mouth Every 8 (Eight) Hours As Needed for Nausea or Vomiting. 30 tablet 0   • polyethylene glycol (MIRALAX) packet Take 17 g by mouth Every 3 (Three) Days.     • triphrocaps (NEPHROCAPS) 1 MG capsule capsule Take 1 capsule by mouth.     • warfarin (COUMADIN) 2.5 MG tablet Take 2.5 mg by mouth every  other day.       No current facility-administered medications on file prior to visit.          Social History     Socioeconomic History   • Marital status:      Spouse name: Not on file   • Number of children: Not on file   • Years of education: Not on file   • Highest education level: Not on file   Tobacco Use   • Smoking status: Former Smoker   • Smokeless tobacco: Never Used   Substance and Sexual Activity   • Alcohol use: No   • Drug use: No   • Sexual activity: Defer       Family History   Problem Relation Age of Onset   • Other Brother         acute bacterial endocarditis         Review of Systems   Constitution: Negative for decreased appetite, diaphoresis, fever, weakness, malaise/fatigue, weight gain and weight loss.   HENT: Negative for congestion, hearing loss, nosebleeds and tinnitus.    Eyes: Negative for blurred vision, double vision, vision loss in left eye, vision loss in right eye and visual disturbance.   Cardiovascular:        As noted in HPI   Respiratory:        As noted HPI   Endocrine: Negative for cold intolerance and heat intolerance.   Hematologic/Lymphatic: Negative for bleeding problem. Does not bruise/bleed easily.   Skin: Negative for color change, flushing, itching and rash.   Musculoskeletal: Negative for arthritis, back pain, joint pain, joint swelling, muscle weakness and myalgias.   Gastrointestinal: Negative for bloating, abdominal pain, constipation, diarrhea, dysphagia, heartburn, hematemesis, hematochezia, melena, nausea and vomiting.   Genitourinary: Negative for bladder incontinence, dysuria, frequency, nocturia and urgency.   Neurological: Negative for dizziness, focal weakness, headaches, light-headedness, loss of balance, numbness, paresthesias and vertigo.   Psychiatric/Behavioral: Negative for depression, memory loss and substance abuse.       Procedures      ECG 12 Lead  Date/Time: 4/25/2019 3:31 PM  Performed by: Niranjan Ponce MD  Authorized by: Kris  MD Niranjan   Comparison: compared with previous ECG   Similar to previous ECG  Rhythm: atrial fibrillation  Rate: normal  QRS axis: left                  Objective:    There were no vitals taken for this visit.       Physical Exam  Physical Exam   Constitutional: He is oriented to person, place, and time. He appears well-developed and well-nourished. No distress.   HENT:   Head: Normocephalic.   Eyes: Conjunctivae are normal. Pupils are equal, round, and reactive to light. No scleral icterus.   Neck: Normal carotid pulses, no hepatojugular reflux and no JVD present. Carotid bruit is not present. No tracheal deviation, no edema and no erythema present. No thyromegaly present.   Cardiovascular: Normal rate, S1 normal, S2 normal, normal heart sounds and intact distal pulses. An irregularly irregular rhythm present.  No extrasystoles are present. PMI is not displaced. Exam reveals no gallop, no distant heart sounds and no friction rub.   No murmur heard.  Pulses:       Carotid pulses are 2+ on the right side, and 2+ on the left side.       Radial pulses are 2+ on the right side, and 2+ on the left side.        Femoral pulses are 2+ on the right side, and 2+ on the left side.       Dorsalis pedis pulses are 2+ on the right side, and 2+ on the left side.        Posterior tibial pulses are 2+ on the right side, and 2+ on the left side.   Pulmonary/Chest: Effort normal and breath sounds normal. No respiratory distress. He has no decreased breath sounds. He has no wheezes. He has no rhonchi. He has no rales. He exhibits no tenderness.   Abdominal: Soft. Bowel sounds are normal. He exhibits no distension and no mass. There is no hepatosplenomegaly. There is no tenderness. There is no rebound and no guarding.   Musculoskeletal: He exhibits edema (1+ bilateral pretibial). He exhibits no tenderness or deformity.   Neurological: He is alert and oriented to person, place, and time.   Skin: Skin is warm and dry. No rash noted. He  is not diaphoretic. No cyanosis or erythema. No pallor. Nails show no clubbing.   Psychiatric: He has a normal mood and affect. His speech is normal and behavior is normal. Judgment and thought content normal.           Assessment:   1. This is an 88-year-old with a history of PVCs, nonsustained ventricular tachycardia, normal LV function, and normal cardiac catheterization. Continue the same. He will see us  in follow up in 6 months.  2. Chronic Atrial fibrillation, in atrial fibrillation. He could not tolerate amiodarone.   Atrial Fibrillation and Atrial Flutter  Assessment  • The patient has paroxysmal atrial fibrillation  • This is non-valvular in etiology  • The patient's CHADS2-VASc score is 3  • A [id number] score of 2 or more is considered a high risk for a thromboembolic event  • Warfarin prescribed     Plan  • Continue in atrial fibrillation with rate control  • Continue warfarin for antithrombotic therapy, bleeding issues discussed  • Continue diltiazem for rate control  Very well now he will continue the same C is seen in follow-up in 6 months.     3. Dilated aortic root.  Echocardiogram December 2018 normal LV systolic function no real significant valvular disease and aortic root is mildly enlarged.  Would be conservative and not really a candidate for surgical repair.  4. Renal Failure. Now on dialysis.            Plan:

## 2019-05-09 RX ORDER — FINASTERIDE 5 MG/1
5 TABLET, FILM COATED ORAL DAILY
Qty: 90 TABLET | Refills: 3 | Status: SHIPPED | OUTPATIENT
Start: 2019-05-09 | End: 2020-02-23 | Stop reason: SDUPTHER

## 2019-07-01 ENCOUNTER — TELEPHONE (OUTPATIENT)
Dept: SPORTS MEDICINE | Facility: CLINIC | Age: 84
End: 2019-07-01

## 2019-09-30 ENCOUNTER — TELEPHONE (OUTPATIENT)
Dept: SPORTS MEDICINE | Facility: CLINIC | Age: 84
End: 2019-09-30

## 2019-09-30 NOTE — TELEPHONE ENCOUNTER
[place] [facility] [place] to inform you that patient's INR is 1.7 and wanted to know what the patient should do. Please advise, thank you!

## 2019-10-31 ENCOUNTER — OFFICE VISIT (OUTPATIENT)
Dept: CARDIOLOGY | Facility: CLINIC | Age: 84
End: 2019-10-31

## 2019-10-31 VITALS
SYSTOLIC BLOOD PRESSURE: 104 MMHG | BODY MASS INDEX: 22.87 KG/M2 | HEIGHT: 74 IN | WEIGHT: 178.2 LBS | HEART RATE: 81 BPM | DIASTOLIC BLOOD PRESSURE: 70 MMHG

## 2019-10-31 DIAGNOSIS — I47.20 VENTRICULAR TACHYCARDIA (HCC): ICD-10-CM

## 2019-10-31 DIAGNOSIS — I77.810 AORTIC ROOT DILATATION (HCC): ICD-10-CM

## 2019-10-31 DIAGNOSIS — I48.20 CHRONIC ATRIAL FIBRILLATION (HCC): Primary | ICD-10-CM

## 2019-10-31 DIAGNOSIS — N18.5 RENAL FAILURE, CHRONIC, STAGE 5 (HCC): ICD-10-CM

## 2019-10-31 PROCEDURE — 99214 OFFICE O/P EST MOD 30 MIN: CPT | Performed by: INTERNAL MEDICINE

## 2019-10-31 PROCEDURE — 93000 ELECTROCARDIOGRAM COMPLETE: CPT | Performed by: INTERNAL MEDICINE

## 2019-10-31 NOTE — PROGRESS NOTES
Date of Office Visit: 10/31/19  Encounter Provider: Niranjan Ponce MD  Place of Service: [facility] CARDIOLOGY  Patient Name: Shaun Brewster  :1930  Referral Provider:Referring, Self      No chief complaint on file.    History of Present Illness   He is a very pleasant 89-year-old gentleman with history of PVCs, nonsustained ventricular arrhythmias, and normal cardiac catheterization.  He then had paroxysmal atrial fibrillation but could not tolerate amiodarone.  Ultimately he went into atrial fibrillation chronically but is asymptomatic.  He was admitted to the hospital 2018 with what ended up being a small bowel obstruction.  Prolonged NG placement ultimately resolved and was discharged.  During that hospitalization he had a repeat echocardiogram that showed normal LV function mild aortic root enlargement.  He was tachycardic hypotensive alternate was taken off his Cartia and was not discharged on that.  He also had an equivocal troponin most likely due to the tachycardia and hypotension and renal failure  He comes in for followup.  He is been doing well.  He gets some shortness of breath with increased activity.  No chest pain or pressure.  No palpitations no near syncope or syncope.  He had a falling spell but that was a while back.  Unfortunate his wife passed away about a month ago he is at home and has [employer] [employer] a committed night stay with him is a very supportive family around that helps take care of him.          Atrial Fibrillation   Symptoms are negative for dizziness and weakness. Past medical history includes atrial fibrillation.         Past Medical History:   Diagnosis Date   • Anemia    • 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CMS/HCC)    • Arthritis    • Atypical chest pain    • Blind right eye    • Chronic kidney disease (CKD), stage V (CMS/HCC)    • Dizziness    • Dyslipidemia    • Esophageal reflux    • Esophagitis, reflux    • Femur fracture, right (CMS/HCC)     • Hypertension    • Macular degeneration    • Malignant neoplasm of prostate (CMS/HCC)     gland   • Nephrolithiasis    • Nonspecific abnormal finding    • Paroxysmal atrial fibrillation (CMS/HCC)    • Postnasal drip    • Premature ventricular contractions    • Renal disease    • Throat pain    • Urge incontinence of urine    • Ventricular tachycardia (CMS/HCC)          Past Surgical History:   Procedure Laterality Date   • ARTERIOVENOUS FISTULA Left 2015   • HAND SURGERY Bilateral    • HIP SURGERY     • INGUINAL HERNIA REPAIR     • KNEE SURGERY     • OTHER SURGICAL HISTORY      cardiac cath procedure summary normal, corneal lasik   • REPLACEMENT TOTAL KNEE      left    • SHOULDER SURGERY     • TONSILLECTOMY AND ADENOIDECTOMY           Current Outpatient Medications on File Prior to Visit   Medication Sig Dispense Refill   • acetaminophen (TYLENOL) 325 MG tablet Take 2 tablets by mouth Every 4 (Four) Hours As Needed for Mild Pain .     • Calcium Acetate 668 (169 Ca) MG tablet Take 2 Units by mouth 3 (Three) Times a Day With Meals. 180 tablet 11   • diltiaZEM CD (CARDIZEM CD) 120 MG 24 hr capsule Take 1 capsule by mouth Daily. 30 capsule 11   • famotidine (PEPCID) 40 MG tablet TAKE 1 TABLET DAILY 90 tablet 3   • Fexofenadine HCl (MUCINEX ALLERGY PO) Take  by mouth 2 (Two) Times a Day.     • finasteride (PROSCAR) 5 MG tablet Take 1 tablet by mouth Daily. 90 tablet 3   • Multiple Vitamins-Minerals (PRESERVISION AREDS PO) Take  by mouth 2 (Two) Times a Day.     • ondansetron ODT (ZOFRAN-ODT) 4 MG disintegrating tablet Take 1 tablet by mouth Every 8 (Eight) Hours As Needed for Nausea or Vomiting. 30 tablet 0   • polyethylene glycol (MIRALAX) packet Take 17 g by mouth Every 3 (Three) Days.     • warfarin (COUMADIN) 2.5 MG tablet Take 2.5 mg by mouth every other day.       No current facility-administered medications on file prior to visit.          Social History     Socioeconomic History   • Marital status:       Spouse name: Not on file   • Number of children: Not on file   • Years of education: Not on file   • Highest education level: Not on file   Tobacco Use   • Smoking status: Former Smoker   • Smokeless tobacco: Never Used   Substance and Sexual Activity   • Alcohol use: No   • Drug use: No   • Sexual activity: Defer   Lifestyle   • Physical activity:     Days per week: Patient refused     Minutes per session: Patient refused   • Stress: Not on file       Family History   Problem Relation Age of Onset   • Other Brother         acute bacterial endocarditis         Review of Systems   Constitution: Negative for decreased appetite, diaphoresis, fever, weakness, malaise/fatigue, weight gain and weight loss.   HENT: Positive for hearing loss. Negative for congestion, nosebleeds and tinnitus.    Eyes: Positive for visual disturbance. Negative for blurred vision, double vision, vision loss in left eye and vision loss in right eye.   Cardiovascular:        As noted in HPI   Respiratory:        As noted HPI   Endocrine: Negative for cold intolerance and heat intolerance.   Hematologic/Lymphatic: Negative for bleeding problem. Does not bruise/bleed easily.   Skin: Positive for rash. Negative for color change, flushing and itching.   Musculoskeletal: Negative for arthritis, back pain, joint pain, joint swelling, muscle weakness and myalgias.   Gastrointestinal: Negative for bloating, abdominal pain, constipation, diarrhea, dysphagia, heartburn, hematemesis, hematochezia, melena, nausea and vomiting.   Genitourinary: Negative for bladder incontinence, dysuria, frequency, nocturia and urgency.   Neurological: Negative for dizziness, focal weakness, headaches, light-headedness, loss of balance, numbness, paresthesias and vertigo.   Psychiatric/Behavioral: Negative for depression, memory loss and substance abuse.       Procedures      ECG 12 Lead  Date/Time: 10/31/2019 2:02 PM  Performed by: Niranjan Ponce MD  Authorized by:  Niranjan Ponce MD   Comparison: compared with previous ECG   Similar to previous ECG  Rhythm: atrial fibrillation  Rate: normal  QRS axis: normal  Other findings: non-specific ST-T wave changes                Objective:    There were no vitals taken for this visit.       Physical Exam  Physical Exam   Constitutional: He is oriented to person, place, and time. He appears well-developed and well-nourished. No distress.   HENT:   Head: Normocephalic.   Eyes: Conjunctivae are normal. Pupils are equal, round, and reactive to light. No scleral icterus.   Neck: Normal carotid pulses, no hepatojugular reflux and no JVD present. Carotid bruit is not present. No tracheal deviation, no edema and no erythema present. No thyromegaly present.   Cardiovascular: Normal rate, S1 normal, S2 normal, normal heart sounds and intact distal pulses. An irregularly irregular rhythm present.  No extrasystoles are present. PMI is not displaced. Exam reveals no gallop, no distant heart sounds and no friction rub.   No murmur heard.  Pulses:       Carotid pulses are 2+ on the right side, and 2+ on the left side.       Radial pulses are 2+ on the right side, and 2+ on the left side.        Femoral pulses are 2+ on the right side, and 2+ on the left side.       Dorsalis pedis pulses are 2+ on the right side, and 2+ on the left side.        Posterior tibial pulses are 2+ on the right side, and 2+ on the left side.   Pulmonary/Chest: Effort normal and breath sounds normal. No respiratory distress. He has no decreased breath sounds. He has no wheezes. He has no rhonchi. He has no rales. He exhibits no tenderness.   Abdominal: Soft. Bowel sounds are normal. He exhibits no distension and no mass. There is no hepatosplenomegaly. There is no tenderness. There is no rebound and no guarding.   Musculoskeletal: He exhibits no edema, tenderness or deformity.   Neurological: He is alert and oriented to person, place, and time.   Skin: Skin is warm and  dry. No rash noted. He is not diaphoretic. No cyanosis or erythema. No pallor. Nails show no clubbing.   Psychiatric: He has a normal mood and affect. His speech is normal and behavior is normal. Judgment and thought content normal.           Assessment:   1. This is an 89-year-old with a history of PVCs, nonsustained ventricular tachycardia, normal LV function, and normal cardiac catheterization. Continue the same. He will see us  in follow up in 6 months.  2. Chronic Atrial fibrillation, in atrial fibrillation. He could not tolerate amiodarone.   Atrial Fibrillation and Atrial Flutter  Assessment  • The patient has paroxysmal atrial fibrillation  • This is non-valvular in etiology  • The patient's CHADS2-VASc score is 3  • A [id number] score of 2 or more is considered a high risk for a thromboembolic event  • Warfarin prescribed     Plan  • Continue in atrial fibrillation with rate control  • Continue warfarin for antithrombotic therapy, bleeding issues discussed  • Continue diltiazem for rate control  Very well now he will continue the same and see us in follow-up in 6 months.     3. Dilated aortic root.  Echocardiogram December 2018 normal LV systolic function no real significant valvular disease and aortic root is mildly enlarged.  Would be conservative and not really a candidate for surgical repair.  4. Renal Failure. Now on dialysis.             Plan:

## 2019-11-11 ENCOUNTER — DOCUMENTATION (OUTPATIENT)
Dept: SPORTS MEDICINE | Facility: CLINIC | Age: 84
End: 2019-11-11

## 2019-11-11 RX ORDER — TRIAMCINOLONE ACETONIDE 0.1 %
1 PASTE (GRAM) DENTAL 2 TIMES DAILY
Qty: 14 G | Refills: 0 | Status: SHIPPED | OUTPATIENT
Start: 2019-11-11 | End: 2019-11-18

## 2019-12-02 ENCOUNTER — TELEPHONE (OUTPATIENT)
Dept: SPORTS MEDICINE | Facility: CLINIC | Age: 84
End: 2019-12-02

## 2019-12-02 NOTE — TELEPHONE ENCOUNTER
Patient PT/INR 4.6  Savana from [facility] called and report abnormal and how to proceed with this. Please advise thank you.

## 2020-01-02 ENCOUNTER — DOCUMENTATION (OUTPATIENT)
Dept: SPORTS MEDICINE | Facility: CLINIC | Age: 85
End: 2020-01-02

## 2020-01-02 RX ORDER — WARFARIN SODIUM 2.5 MG/1
2.5 TABLET ORAL EVERY OTHER DAY
Qty: 90 TABLET | Refills: 3 | Status: SHIPPED | OUTPATIENT
Start: 2020-01-02 | End: 2020-01-03 | Stop reason: DRUGHIGH

## 2020-01-02 RX ORDER — WARFARIN SODIUM 5 MG/1
TABLET ORAL
Qty: 90 TABLET | Refills: 3 | Status: SHIPPED | OUTPATIENT
Start: 2020-01-02 | End: 2020-01-03

## 2020-01-03 RX ORDER — WARFARIN SODIUM 5 MG/1
TABLET ORAL
Qty: 90 TABLET | Refills: 3 | Status: SHIPPED | OUTPATIENT
Start: 2020-01-03 | End: 2020-07-12 | Stop reason: ALTCHOICE

## 2020-02-03 ENCOUNTER — TELEPHONE (OUTPATIENT)
Dept: SPORTS MEDICINE | Facility: CLINIC | Age: 85
End: 2020-02-03

## 2020-02-03 RX ORDER — AMOXICILLIN AND CLAVULANATE POTASSIUM 875; 125 MG/1; MG/1
1 TABLET, FILM COATED ORAL 2 TIMES DAILY
Qty: 20 TABLET | Refills: 0 | Status: SHIPPED | OUTPATIENT
Start: 2020-02-03 | End: 2020-02-23

## 2020-02-23 ENCOUNTER — DOCUMENTATION (OUTPATIENT)
Dept: SPORTS MEDICINE | Facility: CLINIC | Age: 85
End: 2020-02-23

## 2020-02-23 RX ORDER — FINASTERIDE 5 MG/1
5 TABLET, FILM COATED ORAL DAILY
Qty: 90 TABLET | Refills: 3 | Status: SHIPPED | OUTPATIENT
Start: 2020-02-23 | End: 2020-05-12 | Stop reason: SDUPTHER

## 2020-02-23 RX ORDER — FAMOTIDINE 40 MG/1
40 TABLET, FILM COATED ORAL DAILY
Qty: 90 TABLET | Refills: 3 | Status: SHIPPED | OUTPATIENT
Start: 2020-02-23 | End: 2021-01-25

## 2020-02-23 RX ORDER — DILTIAZEM HYDROCHLORIDE 120 MG/1
120 CAPSULE, COATED, EXTENDED RELEASE ORAL DAILY
Qty: 90 CAPSULE | Refills: 11 | Status: SHIPPED | OUTPATIENT
Start: 2020-02-23 | End: 2020-06-04 | Stop reason: SDUPTHER

## 2020-02-25 RX ORDER — DILTIAZEM HYDROCHLORIDE 120 MG/1
CAPSULE, EXTENDED RELEASE ORAL
Qty: 90 CAPSULE | Refills: 3 | Status: SHIPPED | OUTPATIENT
Start: 2020-02-25 | End: 2020-06-04 | Stop reason: SDUPTHER

## 2020-03-31 ENCOUNTER — TELEPHONE (OUTPATIENT)
Dept: SPORTS MEDICINE | Facility: CLINIC | Age: 85
End: 2020-03-31

## 2020-05-07 ENCOUNTER — TELEPHONE (OUTPATIENT)
Dept: CARDIOLOGY | Facility: CLINIC | Age: 85
End: 2020-05-07

## 2020-05-07 NOTE — TELEPHONE ENCOUNTER
S/w patient's caregiver. Patient has memory issues. Offered telehealth. She is going to discuss with family then call back with how they would prefer to manage upcoming appt.

## 2020-05-12 ENCOUNTER — DOCUMENTATION (OUTPATIENT)
Dept: SPORTS MEDICINE | Facility: CLINIC | Age: 85
End: 2020-05-12

## 2020-05-12 RX ORDER — FINASTERIDE 5 MG/1
5 TABLET, FILM COATED ORAL DAILY
Qty: 90 TABLET | Refills: 3 | Status: SHIPPED | OUTPATIENT
Start: 2020-05-12 | End: 2021-01-01

## 2020-06-04 ENCOUNTER — OFFICE VISIT (OUTPATIENT)
Dept: CARDIOLOGY | Facility: CLINIC | Age: 85
End: 2020-06-04

## 2020-06-04 VITALS — SYSTOLIC BLOOD PRESSURE: 128 MMHG | DIASTOLIC BLOOD PRESSURE: 70 MMHG

## 2020-06-04 DIAGNOSIS — I47.20 VENTRICULAR TACHYCARDIA (HCC): ICD-10-CM

## 2020-06-04 DIAGNOSIS — I48.20 CHRONIC ATRIAL FIBRILLATION (HCC): Primary | ICD-10-CM

## 2020-06-04 DIAGNOSIS — I77.810 AORTIC ROOT DILATATION (HCC): ICD-10-CM

## 2020-06-04 DIAGNOSIS — N18.5 RENAL FAILURE, CHRONIC, STAGE 5 (HCC): ICD-10-CM

## 2020-06-04 PROCEDURE — 93000 ELECTROCARDIOGRAM COMPLETE: CPT | Performed by: INTERNAL MEDICINE

## 2020-06-04 PROCEDURE — 99214 OFFICE O/P EST MOD 30 MIN: CPT | Performed by: INTERNAL MEDICINE

## 2020-06-04 RX ORDER — DILTIAZEM HYDROCHLORIDE 180 MG/1
180 CAPSULE, EXTENDED RELEASE ORAL NIGHTLY
Qty: 30 CAPSULE | Refills: 11 | Status: SHIPPED | OUTPATIENT
Start: 2020-06-04 | End: 2021-04-26 | Stop reason: SDUPTHER

## 2020-06-04 RX ORDER — SEVELAMER CARBONATE 800 MG/1
800 TABLET, FILM COATED ORAL 3 TIMES DAILY
COMMUNITY
Start: 2020-05-13

## 2020-06-04 RX ORDER — ASCORBIC ACID, THIAMINE MONONITRATE,RIBOFLAVIN, NIACINAMIDE, PYRIDOXINE HYDROCHLORIDE, FOLIC ACID, CYANOCOBALAMIN, BIOTIN, CALCIUM PANTOTHENATE, 100; 1.5; 1.7; 20; 10; 1; 6000; 150000; 5 MG/1; MG/1; MG/1; MG/1; MG/1; MG/1; UG/1; UG/1; MG/1
1 CAPSULE, LIQUID FILLED ORAL DAILY
COMMUNITY
Start: 2020-05-05 | End: 2022-01-01 | Stop reason: ALTCHOICE

## 2020-06-04 RX ORDER — DILTIAZEM HYDROCHLORIDE 180 MG/1
180 CAPSULE, EXTENDED RELEASE ORAL NIGHTLY
Qty: 30 CAPSULE | Refills: 11 | Status: SHIPPED | OUTPATIENT
Start: 2020-06-04 | End: 2020-06-04 | Stop reason: SDUPTHER

## 2020-06-04 NOTE — PROGRESS NOTES
Date of Office Visit: 20  Encounter Provider: Niranjan Ponce MD  Place of Service: [facility] CARDIOLOGY  Patient Name: Shaun Brewster  :1930  Referral Provider:No ref. provider found      Chief Complaint   Patient presents with   • Fatigue     History of Present Illness   He is a very pleasant 89-year-old gentleman with history of PVCs, nonsustained ventricular arrhythmias, and normal cardiac catheterization.  He then had paroxysmal atrial fibrillation but could not tolerate amiodarone.  Ultimately he went into atrial fibrillation chronically but is asymptomatic.  He was admitted to the hospital 2018 with what ended up being a small bowel obstruction.  Prolonged NG placement ultimately resolved and was discharged.  During that hospitalization he had a repeat echocardiogram that showed normal LV function mild aortic root enlargement.  He was tachycardic hypotensive alternate was taken off his Cartia and was not discharged on that.  He also had an equivocal troponin most likely due to the tachycardia and hypotension and renal failure  He comes in for followup.  Unfortunately his daughter says he has been more out of breath he says he is more just weak tired.  No jason orthopnea or PND no real increased lower extremity edema he is still going to dialysis.  No chest pain or pressure no palpitations no near syncope or syncope.  And no real other changes noted.    Atrial Fibrillation   Symptoms are negative for dizziness and weakness. Past medical history includes atrial fibrillation.         Past Medical History:   Diagnosis Date   • Anemia    • 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CMS/HCC)    • Arthritis    • Atypical chest pain    • Blind right eye    • Chronic kidney disease (CKD), stage V (CMS/HCC)    • Dizziness    • Dyslipidemia    • Esophageal reflux    • Esophagitis, reflux    • Femur fracture, right (CMS/HCC)    • Hypertension    • Macular degeneration    • Malignant  neoplasm of prostate (CMS/HCC)     gland   • Nephrolithiasis    • Nonspecific abnormal finding    • Paroxysmal atrial fibrillation (CMS/HCC)    • Postnasal drip    • Premature ventricular contractions    • Renal disease    • Throat pain    • Urge incontinence of urine    • Ventricular tachycardia (CMS/HCC)          Past Surgical History:   Procedure Laterality Date   • ARTERIOVENOUS FISTULA Left 2015   • HAND SURGERY Bilateral    • HIP SURGERY     • INGUINAL HERNIA REPAIR     • KNEE SURGERY     • OTHER SURGICAL HISTORY      cardiac cath procedure summary normal, corneal lasik   • REPLACEMENT TOTAL KNEE      left    • SHOULDER SURGERY     • TONSILLECTOMY AND ADENOIDECTOMY           Current Outpatient Medications on File Prior to Visit   Medication Sig Dispense Refill   • acetaminophen (TYLENOL) 325 MG tablet Take 2 tablets by mouth Every 4 (Four) Hours As Needed for Mild Pain .     • B Complex-C-Folic Acid (RENAL) 1 MG capsule capsule Take 1 capsule by mouth Daily.     • DILT- MG 24 hr capsule TAKE 1 CAPSULE EVERY NIGHT 90 capsule 3   • famotidine (PEPCID) 40 MG tablet Take 1 tablet by mouth Daily. 90 tablet 3   • Fexofenadine HCl (MUCINEX ALLERGY PO) Take  by mouth 2 (Two) Times a Day.     • finasteride (PROSCAR) 5 MG tablet Take 1 tablet by mouth Daily. 90 tablet 3   • Multiple Vitamins-Minerals (PRESERVISION AREDS PO) Take  by mouth 2 (Two) Times a Day.     • ondansetron ODT (ZOFRAN-ODT) 4 MG disintegrating tablet Take 1 tablet by mouth Every 8 (Eight) Hours As Needed for Nausea or Vomiting. 30 tablet 0   • polyethylene glycol (MIRALAX) packet Take 17 g by mouth Every 3 (Three) Days.     • RENVELA 800 MG tablet Take 800 mg by mouth 3 (Three) Times a Day. Take 3 tablets x3 daily     • warfarin (JANTOVEN) 5 MG tablet TAKE 1 TABLET DAILY OR AS  DIRECTED 90 tablet 3   • [DISCONTINUED] Calcium Acetate 668 (169 Ca) MG tablet Take 2 Units by mouth 3 (Three) Times a Day With Meals. 180 tablet 11   • [DISCONTINUED]  dilTIAZem CD (CARDIZEM CD) 120 MG 24 hr capsule Take 1 capsule by mouth Daily. 90 capsule 11     No current facility-administered medications on file prior to visit.          Social History     Socioeconomic History   • Marital status:      Spouse name: Not on file   • Number of children: Not on file   • Years of education: Not on file   • Highest education level: Not on file   Tobacco Use   • Smoking status: Former Smoker   • Smokeless tobacco: Never Used   • Tobacco comment: denies caffeine use   Substance and Sexual Activity   • Alcohol use: No   • Drug use: No   • Sexual activity: Defer   Lifestyle   • Physical activity:     Days per week: 2 days     Minutes per session: 10 min   • Stress: Not on file       Family History   Problem Relation Age of Onset   • Other Brother         acute bacterial endocarditis         Review of Systems   Constitution: Positive for malaise/fatigue. Negative for decreased appetite, diaphoresis, fever, weight gain and weight loss.   HENT: Negative for congestion, hearing loss, nosebleeds and tinnitus.    Eyes: Negative for blurred vision, double vision, vision loss in left eye, vision loss in right eye and visual disturbance.   Cardiovascular:        As noted in HPI   Respiratory:        As noted HPI   Endocrine: Negative for cold intolerance and heat intolerance.   Hematologic/Lymphatic: Negative for bleeding problem. Does not bruise/bleed easily.   Skin: Negative for color change, flushing, itching and rash.   Musculoskeletal: Negative for arthritis, back pain, joint pain, joint swelling, muscle weakness and myalgias.   Gastrointestinal: Negative for bloating, abdominal pain, constipation, diarrhea, dysphagia, heartburn, hematemesis, hematochezia, melena, nausea and vomiting.   Genitourinary: Negative for bladder incontinence, dysuria, frequency, nocturia and urgency.   Neurological: Negative for dizziness, focal weakness, headaches, light-headedness, loss of balance,  numbness, paresthesias, vertigo and weakness.   Psychiatric/Behavioral: Negative for depression, memory loss and substance abuse.       Procedures      ECG 12 Lead  Date/Time: 6/4/2020 2:06 PM  Performed by: Niranjan Ponce MD  Authorized by: Niranjan Ponce MD   Comparison: compared with previous ECG   Comparison to previous ECG: Rate is faster.  Rhythm: atrial fibrillation  Rate: tachycardic  QRS axis: normal  Other findings: non-specific ST-T wave changes                Objective:    /70 (BP Location: Right arm)        Physical Exam  Physical Exam   Constitutional: He is oriented to person, place, and time. He appears well-developed and well-nourished. No distress.   HENT:   Head: Normocephalic.   Eyes: Pupils are equal, round, and reactive to light. Conjunctivae are normal. No scleral icterus.   Neck: Normal carotid pulses, no hepatojugular reflux and no JVD present. Carotid bruit is not present. No tracheal deviation, no edema and no erythema present. No thyromegaly present.   Cardiovascular: S1 normal, S2 normal, normal heart sounds and intact distal pulses. An irregularly irregular rhythm present.  No extrasystoles are present. Tachycardia present. PMI is not displaced. Exam reveals no gallop, no distant heart sounds and no friction rub.   No murmur heard.  Pulses:       Carotid pulses are 2+ on the right side, and 2+ on the left side.       Radial pulses are 2+ on the right side, and 2+ on the left side.        Femoral pulses are 2+ on the right side, and 2+ on the left side.       Dorsalis pedis pulses are 2+ on the right side, and 2+ on the left side.        Posterior tibial pulses are 2+ on the right side, and 2+ on the left side.   Pulmonary/Chest: Effort normal. No respiratory distress. He has no decreased breath sounds. He has no wheezes. He has no rhonchi. He has rales (Right baase). He exhibits no tenderness.   Abdominal: Soft. Bowel sounds are normal. He exhibits no distension and no  mass. There is no hepatosplenomegaly. There is no tenderness. There is no rebound and no guarding.   Musculoskeletal: He exhibits edema (1+ at ankles R>L). He exhibits no tenderness or deformity.   Neurological: He is alert and oriented to person, place, and time.   Skin: Skin is warm and dry. No rash noted. He is not diaphoretic. No cyanosis or erythema. No pallor. Nails show no clubbing.   Psychiatric: He has a normal mood and affect. His speech is normal and behavior is normal. Judgment and thought content normal.           Assessment:   1. This is an 89-year-old with a history of PVCs, nonsustained ventricular tachycardia, normal LV function, and normal cardiac catheterization. Continue the same. He will see us  in follow up in 6 months.  2. Chronic Atrial fibrillation, in atrial fibrillation. He could not tolerate amiodarone.  The patient's CHADS2-VASc score is 3.  His rate is a little faster today will increase his diltiazem.  3. Dilated aortic root.  Echocardiogram December 2018 normal LV systolic function no real significant valvular disease and aortic root is mildly enlarged.  Would be conservative and not really a candidate for surgical repair.  4. Renal Failure. Now on dialysis.      5.  Dyspnea/weakness/fatigue.  Obviously there are multiple reasons could be due to his age, and/or dialysis, or due to his increased heart rate and atrial fibrillation.  This obviously could have worsening underlying heart disease.  The easiest thing to do at his age would be to try to control his heart rate some if his symptoms improve leave it at that if not may consider echocardiogram just to see if there is been a change.  We will increase the diltiazem 180 mg a day.  They are to call us back in about 2 to 3 weeks with how he is feeling in his heart rate.  We will see him in 3 months.         Plan:

## 2020-06-15 ENCOUNTER — LAB (OUTPATIENT)
Dept: LAB | Facility: HOSPITAL | Age: 85
End: 2020-06-15

## 2020-06-15 DIAGNOSIS — Z51.81 ENCOUNTER FOR MONITORING COUMADIN THERAPY: Primary | ICD-10-CM

## 2020-06-15 DIAGNOSIS — Z79.01 ENCOUNTER FOR MONITORING COUMADIN THERAPY: Primary | ICD-10-CM

## 2020-06-15 LAB
INR PPP: 5.11 (ref 0.9–1.1)
PROTHROMBIN TIME: 47.1 SECONDS (ref 11.7–14.2)

## 2020-06-15 PROCEDURE — 36415 COLL VENOUS BLD VENIPUNCTURE: CPT | Performed by: FAMILY MEDICINE

## 2020-06-15 PROCEDURE — 85610 PROTHROMBIN TIME: CPT | Performed by: FAMILY MEDICINE

## 2020-06-22 ENCOUNTER — TELEPHONE (OUTPATIENT)
Dept: SPORTS MEDICINE | Facility: CLINIC | Age: 85
End: 2020-06-22

## 2020-06-22 NOTE — TELEPHONE ENCOUNTER
[facility] called and states that they checked his INR yesterday and it was 1.9  Please advise. Thank you!

## 2020-07-02 ENCOUNTER — TELEPHONE (OUTPATIENT)
Dept: CARDIOLOGY | Facility: CLINIC | Age: 85
End: 2020-07-02

## 2020-07-02 NOTE — TELEPHONE ENCOUNTER
----- Message from Shirley Tai MA sent at 7/2/2020 11:37 AM EDT -----  Regarding: FW: Visit Follow-Up Question  Contact: [phone]  Please see patient's MY CHART message. Thank you/ ELLIOTT       ----- Message -----  From: Shaun Brewster  Sent: 7/1/2020   6:48 PM EDT  To: Licha De Los Santos [facility]  Subject: Visit Follow-Up Question                         Nic Ponce this is Shaun's daughter Audra Trujillo, I just wanted to update you on his BP and HR after increasing his diltiazem to 180 mg a day. Arian took this earlier in the week and it was 118/68 and HR was low 80's.    We have an additional request if you do not mind. We are in the process of getting Dad's financial matters in order and we need a letter from one of his physicians stating that his mental capacity has diminished. We, my brother and I have POA, but in order to be put onto the accts, the legal side on this is requesting this letter. Because Dad is Arian' father-in-law, Arian is unable to write this letter. We are not asking for a diagnosis, just a note stating his decline. Out of space. Let me know of questions. Thanks!!

## 2020-07-06 DIAGNOSIS — R41.82 MENTAL STATUS, DECREASED: Primary | ICD-10-CM

## 2020-07-06 DIAGNOSIS — F03.90 DEMENTIA WITHOUT BEHAVIORAL DISTURBANCE, UNSPECIFIED DEMENTIA TYPE: ICD-10-CM

## 2020-07-07 ENCOUNTER — OFFICE VISIT (OUTPATIENT)
Dept: NEUROLOGY | Facility: CLINIC | Age: 85
End: 2020-07-07

## 2020-07-07 VITALS — OXYGEN SATURATION: 94 % | HEIGHT: 74 IN | BODY MASS INDEX: 22.46 KG/M2 | WEIGHT: 175 LBS | HEART RATE: 68 BPM

## 2020-07-07 DIAGNOSIS — G30.1 LATE ONSET ALZHEIMER'S DISEASE WITHOUT BEHAVIORAL DISTURBANCE (HCC): Primary | ICD-10-CM

## 2020-07-07 DIAGNOSIS — F02.80 LATE ONSET ALZHEIMER'S DISEASE WITHOUT BEHAVIORAL DISTURBANCE (HCC): Primary | ICD-10-CM

## 2020-07-07 PROCEDURE — 99204 OFFICE O/P NEW MOD 45 MIN: CPT | Performed by: PSYCHIATRY & NEUROLOGY

## 2020-07-07 RX ORDER — QUETIAPINE FUMARATE 25 MG/1
25 TABLET, FILM COATED ORAL NIGHTLY
Qty: 30 TABLET | Refills: 3 | Status: SHIPPED | OUTPATIENT
Start: 2020-07-07 | End: 2020-08-06

## 2020-07-07 NOTE — PROGRESS NOTES
"Chief Complaint   Patient presents with   • Memory Loss       Patient ID: Shaun Brewster is a 89 y.o. male.    HPI: I thank you for referring your patient to see us here in the neurology clinic this afternoon.  As you may know Shaun is a 89-year-old male referred to us by Dr. Trujillo for history of memory loss.  His daughter who accompanies him today says that he has had memory issues for several years however since March of this year has since shown a slight decline in short-term memory and functioning in general.  Unfortunately he did suffer the loss of his wife approximately a year ago.  He also due to the COVID-19 pandemic has been confined to the home mostly since March.  His daughter who accompanies him today states that he has been more tired and sleepy throughout the day.  Unfortunately also he is up all night.  His daughter states that he will be quite restless in the evenings when it is time to sleep.  Shaun does have 24-hour care assistance at home.  When he is in bed he apparently is very restless moving his arms and legs and has trouble laying still.  He essentially is up all night.  That leaves him quite sleepy during the day.  He has had some hallucinations.  His daughter states that he has seen \"alligators\" as well as \"bears\".  Overall he does eat well.  He does interact with family appropriately however again he will repeat himself and has trouble learning new processes.  He was given a phone to use however was unable to understand the functioning of this device.  He does have a college degree and was an  for [employer] for a few years.  No known family history of dementia.  His wife did have dementia.  He has not had any stroke or strokelike symptoms.  No history of severe head injuries.  He does have chronic kidney disease and receives dialysis multiple days throughout the week.  He has done so for the past few years.  No significant added confusion or change in mental status status post dialysis " treatment.  He does have some hearing issues as well as some impairment of vision.  He has not had formal neuropsych testing.  He did have a CT scan of his head back in January of last year which did not show any evidence for acute intracranial issues.    The following portions of the patient's history were reviewed and updated as appropriate: allergies, current medications, past family history, past medical history, past social history, past surgical history and problem list.    Review of Systems   Constitutional: Positive for fatigue. Negative for activity change and appetite change.   HENT: Negative for facial swelling, hearing loss and trouble swallowing.    Eyes: Negative for photophobia, redness and visual disturbance.   Respiratory: Negative for chest tightness, shortness of breath and wheezing.    Cardiovascular: Negative for chest pain, palpitations and leg swelling.   Gastrointestinal: Negative for abdominal pain, nausea and vomiting.   Endocrine: Negative for cold intolerance, heat intolerance and polydipsia.   Musculoskeletal: Positive for gait problem (pt is in wheelchair at apt but only uses for long distances. pt does use cane if needed. balance issues. no falls. ). Negative for back pain and neck pain.   Skin: Negative for color change, rash and wound.   Allergic/Immunologic: Negative for environmental allergies, food allergies and immunocompromised state.   Neurological: Positive for speech difficulty and weakness (general body weakness ). Negative for dizziness, tremors, seizures, syncope, facial asymmetry, light-headedness, numbness and headaches.   Hematological: Negative for adenopathy. Does not bruise/bleed easily.   Psychiatric/Behavioral: Positive for agitation, behavioral problems, confusion (memory issues before but mental status drasticlly changed in FEB/MARCH 2020. ), decreased concentration, hallucinations (pt daughter states he is seeing things in back yard) and sleep disturbance.  Negative for dysphoric mood, self-injury and suicidal ideas. The patient is hyperactive. The patient is not nervous/anxious.       I have reviewed the review of systems above performed by my medical assistant.      Vitals:    20 0828   Pulse: 68   SpO2: 94%       Neurologic Exam     Mental Status   Disoriented to year, month, date, day and season.   Registration: recalls 1 of 3 objects. Follows 3 step commands.   Attention: normal. Concentration: normal.   Speech: speech is normal   Level of consciousness: drowsy  Knowledge: good (No deficits found.).   Unable to repeat. Normal comprehension.     Cranial Nerves     CN II   Visual fields full to confrontation.     CN III, IV, VI   Pupils are equal, round, and reactive to light.  Extraocular motions are normal.   CN III: no CN III palsy  CN VI: no CN VI palsy  Nystagmus: none   Diplopia: none    CN V   Facial sensation intact.     CN VII   Facial expression full, symmetric.     CN VIII   CN VIII normal.     CN IX, X   CN IX normal.   CN X normal.     CN XI   CN XI normal.     CN XII   CN XII normal.     Motor Exam   Muscle bulk: normal  Right arm tone: normal  Left arm tone: normal  Right leg tone: normal  Left leg tone: normal    Strength   Right neck flexion: 5/5  Left neck flexion: 5/5  Right neck extension: 5/5  Left neck extension: 5/5  Right deltoid: 5/5  Left deltoid: 5/5  Right biceps: 5/5  Left biceps: 5/5  Right triceps: 5/5  Left triceps: 5/5  Right wrist flexion: 5/5  Left wrist flexion: 5/5  Right wrist extension: 5/5  Left wrist extension: 5/5  Right interossei: 5/5  Left interossei: 5/5  Right abdominals: 5/5  Left abdominals: 5/5  Right iliopsoas: 5/5  Left iliopsoas: 5/5  Right quadriceps: 5/5  Left quadriceps: 5/5  Right hamstrin/5  Left hamstrin/5  Right glutei: 5/5  Left glutei: 5/5  Right anterior tibial: 5/5  Left anterior tibial: 5/5  Right posterior tibial: 5/5  Left posterior tibial: 5/5  Right peroneal: 5/5  Left peroneal:  5/5  Right gastroc: 5/5  Left gastroc: 5/5    Sensory Exam   Light touch normal.   Vibration normal.   Proprioception normal.   Pinprick normal.     Gait, Coordination, and Reflexes     Gait  Gait: normal    Coordination   Romberg: negative    Tremor   Resting tremor: absent  Intention tremor: absent    Reflexes   Right brachioradialis: 2+  Left brachioradialis: 2+  Right biceps: 2+  Left biceps: 2+  Right triceps: 2+  Left triceps: 2+  Right patellar: 2+  Left patellar: 2+  Right achilles: 2+  Left achilles: 2+  Right : 2+  Left : 2+Station is normal.       Physical Exam   Constitutional: He appears well-developed. No distress.   HENT:   Head: Normocephalic and atraumatic.   Eyes: Pupils are equal, round, and reactive to light. EOM are normal.   Neck: Normal range of motion.   Cardiovascular: Normal rate, regular rhythm and normal heart sounds.   Pulmonary/Chest: Effort normal and breath sounds normal. No respiratory distress.   Abdominal: Soft. Bowel sounds are normal. He exhibits no distension. There is no tenderness.   Musculoskeletal: He exhibits no edema or deformity.   Neurological: He has a normal Romberg Test. Gait normal.   Reflex Scores:       Tricep reflexes are 2+ on the right side and 2+ on the left side.       Bicep reflexes are 2+ on the right side and 2+ on the left side.       Brachioradialis reflexes are 2+ on the right side and 2+ on the left side.       Patellar reflexes are 2+ on the right side and 2+ on the left side.       Achilles reflexes are 2+ on the right side and 2+ on the left side.  Skin: Skin is warm. No rash noted.   Psychiatric: He has a normal mood and affect. His speech is normal. Judgment normal.   Vitals reviewed.      Procedures    Assessment/Plan: I would like to schedule him for a CT scan of the head without contrast.  Due to the restlessness and agitation at night I feel it would be best initially to try and allow him to get better sleep therefore improving his  daily functioning from a cognitive standpoint.  We will try Seroquel low-dose at night.  We will also likely initiate either Namenda or donepezil at his next follow-up.  He likely has dementia of the Alzheimer's type.  He does have hallucinations which would go along with a diagnosis of Lewy body dementia however I do not appreciate any parkinsonism.  We will see him back in 3 to 4 months.       Shaun was seen today for memory loss.    Diagnoses and all orders for this visit:    Late onset Alzheimer's disease without behavioral disturbance (CMS/HCC)  -     QUEtiapine (SEROquel) 25 MG tablet; Take 1 tablet by mouth Every Night for 30 days.  -     CT Head Without Contrast; Future           Mike López II, MD

## 2020-07-12 ENCOUNTER — DOCUMENTATION (OUTPATIENT)
Dept: SPORTS MEDICINE | Facility: CLINIC | Age: 85
End: 2020-07-12

## 2020-07-20 ENCOUNTER — TELEPHONE (OUTPATIENT)
Dept: SPORTS MEDICINE | Facility: CLINIC | Age: 85
End: 2020-07-20

## 2020-07-20 NOTE — TELEPHONE ENCOUNTER
Patient coagu-check representative called and informed a critical INR of 5.3 INR. Please advise, thank you!

## 2020-07-21 ENCOUNTER — HOSPITAL ENCOUNTER (OUTPATIENT)
Dept: CT IMAGING | Facility: HOSPITAL | Age: 85
Discharge: HOME | End: 2020-07-21
Admitting: PSYCHIATRY & NEUROLOGY

## 2020-07-21 DIAGNOSIS — F02.80 LATE ONSET ALZHEIMER'S DISEASE WITHOUT BEHAVIORAL DISTURBANCE (HCC): ICD-10-CM

## 2020-07-21 DIAGNOSIS — G30.1 LATE ONSET ALZHEIMER'S DISEASE WITHOUT BEHAVIORAL DISTURBANCE (HCC): ICD-10-CM

## 2020-07-21 PROCEDURE — 70450 CT HEAD/BRAIN W/O DYE: CPT

## 2020-08-03 DIAGNOSIS — Z20.822 SUSPECTED COVID-19 VIRUS INFECTION: ICD-10-CM

## 2020-08-03 DIAGNOSIS — R43.2 LOSS OF TASTE: Primary | ICD-10-CM

## 2020-08-12 ENCOUNTER — TELEPHONE (OUTPATIENT)
Dept: SPORTS MEDICINE | Facility: CLINIC | Age: 85
End: 2020-08-12

## 2020-08-12 NOTE — TELEPHONE ENCOUNTER
Pharmacy called and wanted to clarify if the patient is on xarelto and warfarin. I informed the pharmacy that the patient is not on warfarin anymore to update their records.      Pharmacist verbalized understanding. No further action required

## 2020-10-07 ENCOUNTER — OFFICE VISIT (OUTPATIENT)
Dept: CARDIOLOGY | Facility: CLINIC | Age: 85
End: 2020-10-07

## 2020-10-07 VITALS
HEART RATE: 111 BPM | HEIGHT: 74 IN | BODY MASS INDEX: 21.69 KG/M2 | SYSTOLIC BLOOD PRESSURE: 132 MMHG | DIASTOLIC BLOOD PRESSURE: 70 MMHG | WEIGHT: 169 LBS

## 2020-10-07 DIAGNOSIS — I47.20 VENTRICULAR TACHYCARDIA (HCC): ICD-10-CM

## 2020-10-07 DIAGNOSIS — I77.810 AORTIC ROOT DILATATION (HCC): ICD-10-CM

## 2020-10-07 DIAGNOSIS — N18.5 RENAL FAILURE, CHRONIC, STAGE 5 (HCC): ICD-10-CM

## 2020-10-07 DIAGNOSIS — I48.20 CHRONIC ATRIAL FIBRILLATION (HCC): Primary | ICD-10-CM

## 2020-10-07 PROCEDURE — 99213 OFFICE O/P EST LOW 20 MIN: CPT | Performed by: INTERNAL MEDICINE

## 2020-10-07 PROCEDURE — 93000 ELECTROCARDIOGRAM COMPLETE: CPT | Performed by: INTERNAL MEDICINE

## 2020-10-07 RX ORDER — QUETIAPINE FUMARATE 25 MG/1
TABLET, FILM COATED ORAL
COMMUNITY
Start: 2020-09-14 | End: 2020-10-13

## 2020-10-07 NOTE — PROGRESS NOTES
Date of Office Visit: 10/07/20  Encounter Provider: Niranjan Ponce MD  Place of Service: [facility] CARDIOLOGY  Patient Name: Shaun Brewster  :1930  Referral Provider:No ref. provider found      Chief Complaint   Patient presents with   • Follow-up     3 month   • Atrial Fibrillation     History of Present Illness   He is a very pleasant 89-year-old gentleman with history of PVCs, nonsustained ventricular arrhythmias, and normal cardiac catheterization.  He then had paroxysmal atrial fibrillation but could not tolerate amiodarone.  Ultimately he went into atrial fibrillation chronically but is asymptomatic.  He was admitted to the hospital 2018 with what ended up being a small bowel obstruction.  Prolonged NG placement ultimately resolved and was discharged.  During that hospitalization he had a repeat echocardiogram that showed normal LV function mild aortic root enlargement.  He was tachycardic hypotensive alternate was taken off his Cartia and was not discharged on that.  He also had an equivocal troponin most likely due to the tachycardia and hypotension and renal failure  He comes in for followup.  The patient denies chest pain, pressure and heaviness. No shortness of breath, othopnea or PND. No palpitations, near syncope or syncope. No stroke type symptoms like paralysis, paresthesia, abrupt vision loss and dysarthria. No bleeding like blood in the stool or dark stools.  He lives at home but he has full-time 24-hour caregivers.  He does some walking with his son on days he does not go down to dialysis.  And overall he feels like he is doing well.    Atrial Fibrillation  Symptoms are negative for dizziness and weakness. Past medical history includes atrial fibrillation.         Past Medical History:   Diagnosis Date   • Anemia    • 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CMS/HCC)    • Arthritis    • Atypical chest pain    • Blind right eye    • Chronic kidney disease (CKD), stage  V (CMS/HCC)    • Difficulty walking    • Dizziness    • Dyslipidemia    • Esophageal reflux    • Esophagitis, reflux    • Femur fracture, right (CMS/HCC)    • HL (hearing loss)    • Hypertension    • Macular degeneration    • Malignant neoplasm of prostate (CMS/HCC)     gland   • Memory loss    • Nephrolithiasis    • Nonspecific abnormal finding    • Paroxysmal atrial fibrillation (CMS/HCC)    • Postnasal drip    • Premature ventricular contractions    • Renal disease    • Throat pain    • Urge incontinence of urine    • Ventricular tachycardia (CMS/HCC)          Past Surgical History:   Procedure Laterality Date   • ARTERIOVENOUS FISTULA Left 2015   • HAND SURGERY Bilateral    • HIP SURGERY     • INGUINAL HERNIA REPAIR     • KNEE SURGERY     • OTHER SURGICAL HISTORY      cardiac cath procedure summary normal, corneal lasik   • REPLACEMENT TOTAL KNEE      left    • SHOULDER SURGERY     • TONSILLECTOMY AND ADENOIDECTOMY           Current Outpatient Medications on File Prior to Visit   Medication Sig Dispense Refill   • acetaminophen (TYLENOL) 325 MG tablet Take 2 tablets by mouth Every 4 (Four) Hours As Needed for Mild Pain .     • B Complex-C-Folic Acid (RENAL) 1 MG capsule capsule Take 1 capsule by mouth Daily.     • dilTIAZem XR (DILACOR XR) 180 MG 24 hr capsule Take 1 capsule by mouth Every Night. 30 capsule 11   • famotidine (PEPCID) 40 MG tablet Take 1 tablet by mouth Daily. 90 tablet 3   • Fexofenadine HCl (MUCINEX ALLERGY PO) Take  by mouth 2 (Two) Times a Day.     • finasteride (PROSCAR) 5 MG tablet Take 1 tablet by mouth Daily. 90 tablet 3   • Multiple Vitamins-Minerals (PRESERVISION AREDS PO) Take  by mouth 2 (Two) Times a Day.     • ondansetron ODT (ZOFRAN-ODT) 4 MG disintegrating tablet Take 1 tablet by mouth Every 8 (Eight) Hours As Needed for Nausea or Vomiting. 30 tablet 0   • polyethylene glycol (MIRALAX) packet Take 17 g by mouth Every 3 (Three) Days.     • QUEtiapine (SEROquel) 25 MG tablet 1  tablet QHS, M, W, F, Sat     • RENVELA 800 MG tablet Take 800 mg by mouth 3 (Three) Times a Day. Take 3 tablets x3 daily     • rivaroxaban (Xarelto) 15 MG tablet Take 1 tablet by mouth Daily. 90 tablet 3     No current facility-administered medications on file prior to visit.          Social History     Socioeconomic History   • Marital status:      Spouse name: Not on file   • Number of children: Not on file   • Years of education: Not on file   • Highest education level: Not on file   Tobacco Use   • Smoking status: Former Smoker   • Smokeless tobacco: Never Used   • Tobacco comment: denies caffeine use   Substance and Sexual Activity   • Alcohol use: No   • Drug use: No   • Sexual activity: Defer   Lifestyle   • Physical activity     Days per week: 2 days     Minutes per session: 10 min   • Stress: Not on file       Family History   Problem Relation Age of Onset   • Other Brother         acute bacterial endocarditis         Review of Systems   Constitution: Negative for decreased appetite, diaphoresis, fever, malaise/fatigue, weight gain and weight loss.   HENT: Negative for congestion, hearing loss, nosebleeds and tinnitus.    Eyes: Negative for blurred vision, double vision, vision loss in left eye, vision loss in right eye and visual disturbance.   Cardiovascular:        As noted in HPI   Respiratory:        As noted HPI   Endocrine: Negative for cold intolerance and heat intolerance.   Hematologic/Lymphatic: Negative for bleeding problem. Does not bruise/bleed easily.   Skin: Negative for color change, flushing, itching and rash.   Musculoskeletal: Negative for arthritis, back pain, joint pain, joint swelling, muscle weakness and myalgias.   Gastrointestinal: Negative for bloating, abdominal pain, constipation, diarrhea, dysphagia, heartburn, hematemesis, hematochezia, melena, nausea and vomiting.   Genitourinary: Negative for bladder incontinence, dysuria, frequency, nocturia and urgency.  "  Neurological: Negative for dizziness, focal weakness, headaches, light-headedness, loss of balance, numbness, paresthesias, vertigo and weakness.   Psychiatric/Behavioral: Negative for depression, memory loss and substance abuse.       Procedures      ECG 12 Lead    Date/Time: 10/7/2020 8:49 AM  Performed by: Niranjan Ponce MD  Authorized by: Niranjan Ponce MD   Comparison: compared with previous ECG   Similar to previous ECG  Rhythm: atrial fibrillation  Rate: tachycardic  QRS axis: normal  Other findings: non-specific ST-T wave changes                Objective:    /70   Pulse 111   Ht 188 cm (74\")   Wt 76.7 kg (169 lb)   BMI 21.70 kg/m²        Physical Exam  Constitutional:       General: Not in acute distress.     Appearance: Well-developed. Not diaphoretic.   Eyes:      General: No scleral icterus.     Conjunctiva/sclera: Conjunctivae normal.      Pupils: Pupils are equal, round, and reactive to light.   HENT:      Head: Normocephalic.   Neck:      Musculoskeletal: No edema or erythema.      Thyroid: No thyromegaly.      Vascular: Normal carotid pulses. No carotid bruit, hepatojugular reflux or JVD.      Trachea: No tracheal deviation.   Pulmonary:      Effort: Pulmonary effort is normal. No respiratory distress.      Breath sounds: Normal breath sounds. No decreased breath sounds. No wheezing. No rhonchi. No rales.   Chest:      Chest wall: Not tender to palpatation.   Cardiovascular:      PMI at left midclavicular line. Tachycardia present. Regularly irregular rhythm. Normal S1. Normal S2.      Murmurs: There is no murmur.      No gallop. No click. No rub.   Pulses:     Intact distal pulses.   Edema:     Peripheral edema absent.   Abdominal:      General: Bowel sounds are normal. There is no distension.      Palpations: Abdomen is soft. There is no abdominal mass.      Tenderness: There is no abdominal tenderness. There is no guarding or rebound.   Musculoskeletal:         General: No " tenderness or deformity.      Extremities: No clubbing present.  Skin:     General: Skin is warm and dry. There is no cyanosis.      Coloration: Skin is not pale.      Findings: No erythema or rash.   Neurological:      Mental Status: Alert and oriented to person, place, and time.   Psychiatric:         Speech: Speech normal.         Behavior: Behavior normal.         Thought Content: Thought content normal.         Judgment: Judgment normal.             Assessment:   1. This is an 89-year-old with a history of PVCs, nonsustained ventricular tachycardia, normal LV function, and normal cardiac catheterization. Continue the same. He will see us  in follow up in 6 months.  2. Chronic Atrial fibrillation, in atrial fibrillation. He could not tolerate amiodarone.  The patient's CHADS2-VASc score is 3.  His rate is a little faster today will increase his diltiazem.  3. Dilated aortic root.  Echocardiogram December 2018 normal LV systolic function no real significant valvular disease and aortic root is mildly enlarged.  Would be conservative and not really a candidate for surgical repair.  4. Renal Failure. Now on dialysis.      5.  Dyspnea/weakness/fatigue.  Seems better.     Plan:

## 2020-10-13 RX ORDER — QUETIAPINE FUMARATE 25 MG/1
TABLET, FILM COATED ORAL
Qty: 30 TABLET | Refills: 3 | Status: SHIPPED | OUTPATIENT
Start: 2020-10-13 | End: 2021-01-25

## 2020-11-03 ENCOUNTER — OFFICE VISIT (OUTPATIENT)
Dept: NEUROLOGY | Facility: CLINIC | Age: 85
End: 2020-11-03

## 2020-11-03 VITALS
BODY MASS INDEX: 21.82 KG/M2 | WEIGHT: 170 LBS | HEIGHT: 74 IN | DIASTOLIC BLOOD PRESSURE: 60 MMHG | HEART RATE: 59 BPM | OXYGEN SATURATION: 98 % | SYSTOLIC BLOOD PRESSURE: 104 MMHG

## 2020-11-03 DIAGNOSIS — F02.80 LATE ONSET ALZHEIMER'S DISEASE WITHOUT BEHAVIORAL DISTURBANCE (HCC): Primary | ICD-10-CM

## 2020-11-03 DIAGNOSIS — G30.1 LATE ONSET ALZHEIMER'S DISEASE WITHOUT BEHAVIORAL DISTURBANCE (HCC): Primary | ICD-10-CM

## 2020-11-03 PROCEDURE — 99214 OFFICE O/P EST MOD 30 MIN: CPT | Performed by: PSYCHIATRY & NEUROLOGY

## 2020-11-03 RX ORDER — DONEPEZIL HYDROCHLORIDE 10 MG/1
10 TABLET, ORALLY DISINTEGRATING ORAL NIGHTLY
Qty: 90 TABLET | Refills: 3 | Status: SHIPPED | OUTPATIENT
Start: 2020-11-03 | End: 2021-05-11

## 2020-11-03 RX ORDER — DONEPEZIL HYDROCHLORIDE 5 MG/1
5 TABLET, FILM COATED ORAL DAILY
Qty: 21 TABLET | Refills: 0 | Status: SHIPPED | OUTPATIENT
Start: 2020-11-03 | End: 2021-04-08 | Stop reason: DRUGHIGH

## 2020-11-03 NOTE — PROGRESS NOTES
Chief Complaint   Patient presents with   • Dementia       Patient ID: Shaun Brewster is a 89 y.o. male.    HPI: I have had the pleasure of seeing your patient today.  As you may know he is a 89-year-old gentleman with history of dementia.  His daughter who accompanies him today says that he has been doing approximately the same.  No significant change since last visit.  He has had some issues with hallucinations.  It seems to be worse while sitting outside.  He would see certain birds and trees which were not there.  He does seem more confused at night.  He has tried to leave.  He does have 24-hour care and is easily redirected in the evenings therefore avoiding any harm.  We did start him on Seroquel which has helped him with respect to sleep.  He is sleeping better.  He has not had any new onset focal weakness or numbness.  No resting tremor or changes of gait.  He does walk with his son 3 to 4 days/week.  He currently does not ask for food.  Usually nothing sounds appetizing for him however once food is placed before him he will eat.  He typically does not know the day.  He does look through magazines and looks at the pictures in the magazines.  He does have some social interaction with the caregivers there.      The following portions of the patient's history were reviewed and updated as appropriate: allergies, current medications, past family history, past medical history, past social history, past surgical history and problem list.    Review of Systems   Constitutional: Negative for activity change, appetite change and fatigue.   Musculoskeletal: Positive for gait problem (not very steady on feet daughter states, no falls. uses rollater for assistance at home. Pt is in wheelchair today at appointment. uses for long distances. ). Negative for back pain and myalgias.   Neurological: Positive for speech difficulty. Negative for dizziness, tremors, seizures, syncope, facial asymmetry, weakness, light-headedness,  numbness and headaches.   Hematological: Negative for adenopathy. Does not bruise/bleed easily.   Psychiatric/Behavioral: Positive for confusion and decreased concentration. Negative for agitation, behavioral problems, dysphoric mood, hallucinations, self-injury, sleep disturbance and suicidal ideas. The patient is not nervous/anxious and is not hyperactive.       I have reviewed the review of systems above performed by my medical assistant.      Vitals:    20 1030   BP: 104/60   Pulse: 59   SpO2: 98%       Neurologic Exam     Mental Status   Oriented to person.   Oriented to place.   Disoriented to date and day.   Concentration: normal.   Level of consciousness: drowsy  Knowledge: poor.     Cranial Nerves     CN II   Visual fields full to confrontation.     CN III, IV, VI   Pupils are equal, round, and reactive to light.  Extraocular motions are normal.   CN III: no CN III palsy  CN VI: no CN VI palsy    CN V   Facial sensation intact.     CN VII   Facial expression full, symmetric.     CN VIII   CN VIII normal.     CN IX, X   CN IX normal.   CN X normal.     CN XI   CN XI normal.     CN XII   CN XII normal.     Motor Exam     Strength   Right neck flexion: 5/5  Left neck flexion: 5/5  Right neck extension: 5/5  Left neck extension: 5/5  Right deltoid: 5/5  Left deltoid: 5/5  Right biceps: 5/5  Left biceps: 5/5  Right triceps: 5/5  Left triceps: 5/5  Right wrist flexion: 5/5  Left wrist flexion: 5/5  Right wrist extension: 5/5  Left wrist extension: 5/5  Right interossei: 5/5  Left interossei: 5/5  Right abdominals: 5/5  Left abdominals: 5/5  Right iliopsoas: 5/5  Left iliopsoas: 5/5  Right quadriceps: 5/5  Left quadriceps: 5/5  Right hamstrin/5  Left hamstrin/5  Right glutei: 5/5  Left glutei: 5/5  Right anterior tibial: 5/5  Left anterior tibial: 5/5  Right posterior tibial: 5/5  Left posterior tibial: 5/5  Right peroneal: 5/5  Left peroneal: 5/5  Right gastroc: 5/5  Left gastroc: 5/5    Sensory  Exam   Light touch normal.   Vibration normal.     Gait, Coordination, and Reflexes     Gait  Gait: normal    Reflexes   Right brachioradialis: 2+  Left brachioradialis: 2+  Right biceps: 2+  Left biceps: 2+  Right triceps: 2+  Left triceps: 2+  Right patellar: 2+  Left patellar: 2+  Right achilles: 2+  Left achilles: 2+  Right : 2+  Left : 2+Station is normal.       Physical Exam  Vitals signs reviewed.   Constitutional:       Appearance: He is well-developed.   HENT:      Head: Normocephalic and atraumatic.   Eyes:      Extraocular Movements: EOM normal.      Pupils: Pupils are equal, round, and reactive to light.   Cardiovascular:      Rate and Rhythm: Normal rate and regular rhythm.   Pulmonary:      Breath sounds: Normal breath sounds.   Musculoskeletal: Normal range of motion.   Skin:     General: Skin is warm.   Neurological:      Gait: Gait is intact.      Deep Tendon Reflexes:      Reflex Scores:       Tricep reflexes are 2+ on the right side and 2+ on the left side.       Bicep reflexes are 2+ on the right side and 2+ on the left side.       Brachioradialis reflexes are 2+ on the right side and 2+ on the left side.       Patellar reflexes are 2+ on the right side and 2+ on the left side.       Achilles reflexes are 2+ on the right side and 2+ on the left side.        Procedures    Assessment/Plan: We will start him on donepezil titration to 10 mg daily.  We did have a lengthy discussion about what to expect with this medication.  We are likely going to try the Namenda as well.  Return to clinic in 3 months or sooner if needed.  A total of 25 minutes was spent face-to-face with the patient today.  Of that greater than 50% of this time was spent discussing signs and symptoms of dementia, patient education, plan of care and prognosis.     Diagnoses and all orders for this visit:    1. Late onset Alzheimer's disease without behavioral disturbance (CMS/HCC) (Primary)  -     donepezil (ARICEPT) 5 MG  tablet; Take 1 tablet by mouth Daily for 21 days.  Dispense: 21 tablet; Refill: 0  -     donepezil ODT (ARICEPT ODT) 10 MG disintegrating tablet; Place 1 tablet on the tongue Every Night for 90 days.  Dispense: 90 tablet; Refill: 3           Mike López II, MD

## 2021-01-01 ENCOUNTER — DOCUMENTATION (OUTPATIENT)
Dept: SPORTS MEDICINE | Facility: CLINIC | Age: 86
End: 2021-01-01

## 2021-01-01 ENCOUNTER — OFFICE VISIT (OUTPATIENT)
Dept: NEUROLOGY | Facility: CLINIC | Age: 86
End: 2021-01-01

## 2021-01-01 ENCOUNTER — OFFICE VISIT (OUTPATIENT)
Dept: CARDIOLOGY | Facility: CLINIC | Age: 86
End: 2021-01-01

## 2021-01-01 VITALS
SYSTOLIC BLOOD PRESSURE: 118 MMHG | WEIGHT: 166 LBS | HEIGHT: 73 IN | DIASTOLIC BLOOD PRESSURE: 60 MMHG | BODY MASS INDEX: 22 KG/M2 | HEART RATE: 62 BPM

## 2021-01-01 VITALS
BODY MASS INDEX: 22 KG/M2 | SYSTOLIC BLOOD PRESSURE: 110 MMHG | RESPIRATION RATE: 16 BRPM | WEIGHT: 166 LBS | DIASTOLIC BLOOD PRESSURE: 58 MMHG | OXYGEN SATURATION: 98 % | HEIGHT: 73 IN | HEART RATE: 52 BPM

## 2021-01-01 DIAGNOSIS — Z91.81 AT RISK FOR FALLS: ICD-10-CM

## 2021-01-01 DIAGNOSIS — G30.1 LATE ONSET ALZHEIMER'S DISEASE WITHOUT BEHAVIORAL DISTURBANCE (HCC): Primary | ICD-10-CM

## 2021-01-01 DIAGNOSIS — F02.80 LATE ONSET ALZHEIMER'S DISEASE WITHOUT BEHAVIORAL DISTURBANCE (HCC): ICD-10-CM

## 2021-01-01 DIAGNOSIS — G30.1 LATE ONSET ALZHEIMER'S DISEASE WITHOUT BEHAVIORAL DISTURBANCE (HCC): ICD-10-CM

## 2021-01-01 DIAGNOSIS — I48.20 CHRONIC ATRIAL FIBRILLATION (HCC): Primary | ICD-10-CM

## 2021-01-01 DIAGNOSIS — F02.80 LATE ONSET ALZHEIMER'S DISEASE WITHOUT BEHAVIORAL DISTURBANCE (HCC): Primary | ICD-10-CM

## 2021-01-01 PROCEDURE — 99214 OFFICE O/P EST MOD 30 MIN: CPT | Performed by: NURSE PRACTITIONER

## 2021-01-01 PROCEDURE — 93000 ELECTROCARDIOGRAM COMPLETE: CPT | Performed by: INTERNAL MEDICINE

## 2021-01-01 PROCEDURE — 99214 OFFICE O/P EST MOD 30 MIN: CPT | Performed by: INTERNAL MEDICINE

## 2021-01-01 RX ORDER — FINASTERIDE 5 MG/1
TABLET, FILM COATED ORAL
Qty: 90 TABLET | Refills: 3 | Status: SHIPPED | OUTPATIENT
Start: 2021-01-01

## 2021-01-01 RX ORDER — MEMANTINE HYDROCHLORIDE 5 MG/1
5 TABLET ORAL DAILY
Qty: 30 TABLET | Refills: 2 | Status: SHIPPED | OUTPATIENT
Start: 2021-01-01 | End: 2021-01-01 | Stop reason: HOSPADM

## 2021-01-01 RX ORDER — QUETIAPINE FUMARATE 25 MG/1
TABLET, FILM COATED ORAL
Qty: 90 TABLET | Refills: 1 | Status: SHIPPED | OUTPATIENT
Start: 2021-01-01 | End: 2022-01-01

## 2021-01-01 RX ORDER — VIT B COMP C/FOLIC ACID/VIT D3 800-2000
TABLET,CHEWABLE ORAL
COMMUNITY
Start: 2021-06-17 | End: 2022-01-01 | Stop reason: ALTCHOICE

## 2021-01-24 DIAGNOSIS — G30.1 LATE ONSET ALZHEIMER'S DISEASE WITHOUT BEHAVIORAL DISTURBANCE (HCC): Primary | ICD-10-CM

## 2021-01-24 DIAGNOSIS — F02.80 LATE ONSET ALZHEIMER'S DISEASE WITHOUT BEHAVIORAL DISTURBANCE (HCC): Primary | ICD-10-CM

## 2021-01-25 RX ORDER — QUETIAPINE FUMARATE 25 MG/1
TABLET, FILM COATED ORAL
Qty: 90 TABLET | Refills: 0 | Status: SHIPPED | OUTPATIENT
Start: 2021-01-25 | End: 2021-05-12

## 2021-01-25 RX ORDER — FAMOTIDINE 40 MG/1
TABLET, FILM COATED ORAL
Qty: 90 TABLET | Refills: 3 | Status: SHIPPED | OUTPATIENT
Start: 2021-01-25 | End: 2021-03-08 | Stop reason: SDUPTHER

## 2021-02-04 ENCOUNTER — OFFICE VISIT (OUTPATIENT)
Dept: NEUROLOGY | Facility: CLINIC | Age: 86
End: 2021-02-04

## 2021-02-04 VITALS
HEART RATE: 69 BPM | SYSTOLIC BLOOD PRESSURE: 110 MMHG | BODY MASS INDEX: 21.3 KG/M2 | WEIGHT: 166 LBS | HEIGHT: 74 IN | DIASTOLIC BLOOD PRESSURE: 68 MMHG | OXYGEN SATURATION: 99 %

## 2021-02-04 DIAGNOSIS — F02.80 LATE ONSET ALZHEIMER'S DISEASE WITHOUT BEHAVIORAL DISTURBANCE (HCC): Primary | ICD-10-CM

## 2021-02-04 DIAGNOSIS — Z91.81 AT RISK FOR FALLS: ICD-10-CM

## 2021-02-04 DIAGNOSIS — G30.1 LATE ONSET ALZHEIMER'S DISEASE WITHOUT BEHAVIORAL DISTURBANCE (HCC): Primary | ICD-10-CM

## 2021-02-04 PROCEDURE — 99215 OFFICE O/P EST HI 40 MIN: CPT | Performed by: NURSE PRACTITIONER

## 2021-02-04 RX ORDER — VIT B COMP C/FOLIC ACID/VIT D3 800-2000
TABLET,CHEWABLE ORAL
COMMUNITY
Start: 2020-11-03 | End: 2021-04-08 | Stop reason: ALTCHOICE

## 2021-02-04 RX ORDER — LIDOCAINE AND PRILOCAINE 25; 25 MG/G; MG/G
CREAM TOPICAL
COMMUNITY
Start: 2021-01-19

## 2021-02-04 NOTE — PROGRESS NOTES
DOS: 2021  NAME: Shaun Brewster   : 1930  PCP: Andrew Trujillo MD    Chief Complaint   Patient presents with   • Alzheimer's Disease      SUBJECTIVE  Neurological Problem:  89 y.o. RHW male with dementia, Afib (on xarelto), ESRD on HD, macular degeneration, history of prostate CA who presents today for f/u of dementia. He is accompanied by his son Henrique. Patient and problem are new to examiner. History is provided by patient, son and review of records that are summarized below.     Interval History:   Mr. Brewster was initially evaluated by Dr. López in 2020 for dementia, last seen on 11/3/2020. Review of his progress notes indicates family reported short-term memory, functioning, loss of his spouse about a year prior, sleepy during the day, restless during the evenings and at night, has 24-hour care assistance at home, appetite good, apparently some report of hallucinations, thought likely to be AD; however possible Lewy body type dementia was discussed.  CT head was done on 2020 showing diffuse atrophy, small vessel ischemic changes, no acute changes.  He was started on seroquel to help with sleep. In November, was started on donepezil, subsequently titrated to 10 mg daily.     He presents today with his son, who reports improvement in patient's overall memory/functioning since increasing medication. He lives in his own home, has a caregiver , sometimes mixes up their names when talking about them. Prior to donepezil, son states his dad confused him with his  brother, now on 10 mg aricept he rarely does this. No adverse effects. No issues with hallucinations. They report that sleep is good, appetite is good. He takes walks with son 3-4 x per week. Gets dialysis 3x per week. Patient himself denies any concerns. 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  He is  able to get dressed, eat, toilet and bathe nearly independently but has assistance. Uses a walker regularly. No changes in ambulation. No reported falls.   Spouse had dementia, no personal family history of dementia.    Review of Systems:Review of Systems   Constitutional: Negative for activity change, appetite change and fatigue.   HENT: Negative for ear pain, tinnitus and trouble swallowing.    Musculoskeletal: Negative for back pain, gait problem and neck pain.   Neurological: Positive for dizziness and light-headedness. Negative for tremors, seizures, syncope, facial asymmetry, speech difficulty, weakness, numbness and headaches.   Psychiatric/Behavioral: Positive for confusion and decreased concentration. Negative for agitation, behavioral problems, dysphoric mood, hallucinations, self-injury, sleep disturbance and suicidal ideas. The patient is not nervous/anxious and is not hyperactive.     Above ROS reviewed    The following portions of the patient's history were reviewed and updated as appropriate: allergies, current medications, past family history, past medical history, past social history, past surgical history and problem list.    Current Medications:   Current Outpatient Medications:   •  acetaminophen (TYLENOL) 325 MG tablet, Take 2 tablets by mouth Every 4 (Four) Hours As Needed for Mild Pain ., Disp: , Rfl:   •  B Complex-C-Biotin-D-FA (Dialyvite 800 Plus D) 800 MCG wafer, CHEW & SWALLOW 1 TABLET DAILY, Disp: , Rfl:   •  B Complex-C-Folic Acid (RENAL) 1 MG capsule capsule, Take 1 capsule by mouth Daily., Disp: , Rfl:   •  dilTIAZem XR (DILACOR XR) 180 MG 24 hr capsule, Take 1 capsule by mouth Every Night., Disp: 30 capsule, Rfl: 11  •  donepezil ODT (ARICEPT ODT) 10 MG disintegrating tablet, Place 1 tablet on the tongue Every Night for 90 days., Disp: 90 tablet, Rfl: 3  •  famotidine (PEPCID) 40 MG tablet, TAKE 1 TABLET DAILY, Disp: 90 tablet, Rfl: 3  •  Fexofenadine HCl (MUCINEX ALLERGY PO), Take   by mouth 2 (Two) Times a Day., Disp: , Rfl:   •  finasteride (PROSCAR) 5 MG tablet, Take 1 tablet by mouth Daily., Disp: 90 tablet, Rfl: 3  •  lidocaine-prilocaine (EMLA) 2.5-2.5 % cream, APPLY SMALL AMOUNT TO ACCESS SITE (AVF) 1 HOUR BEFORE DIALYSIS. COVER WITH OCCLUSIVE DRESSING (SARAN WRAP), Disp: , Rfl:   •  Methoxy PEG-Epoetin Beta (MIRCERA IJ), 75 mcg Every 28 (Twenty-Eight) Days., Disp: , Rfl:   •  Multiple Vitamins-Minerals (PRESERVISION AREDS PO), Take  by mouth 2 (Two) Times a Day., Disp: , Rfl:   •  ondansetron ODT (ZOFRAN-ODT) 4 MG disintegrating tablet, Take 1 tablet by mouth Every 8 (Eight) Hours As Needed for Nausea or Vomiting., Disp: 30 tablet, Rfl: 0  •  polyethylene glycol (MIRALAX) packet, Take 17 g by mouth Every 3 (Three) Days., Disp: , Rfl:   •  QUEtiapine (SEROquel) 25 MG tablet, TAKE 1 TABLET NIGHTLY, Disp: 90 tablet, Rfl: 0  •  RENVELA 800 MG tablet, Take 800 mg by mouth 3 (Three) Times a Day. Take 3 tablets x3 daily, Disp: , Rfl:   •  rivaroxaban (Xarelto) 15 MG tablet, Take 1 tablet by mouth Daily., Disp: 90 tablet, Rfl: 3  •  donepezil (ARICEPT) 5 MG tablet, Take 1 tablet by mouth Daily for 21 days., Disp: 21 tablet, Rfl: 0  **I did not stop or change the above medications.  Patient's medication list was updated to reflect medications they have reported as currently taking, including medication changes made by other providers.    OBJECTIVE  Vitals:    02/04/21 0942   BP: 110/68   Pulse: 69   SpO2: 99%     Body mass index is 21.3 kg/m².    Diagnostics:  CT head 7/21/2020: FINDINGS: There is some mild patchy low-density in the periventricular  white matter consistent with mild small vessel disease. There is a small  ovoid focus of mild hyperdensity in the posterior inferior medial right  frontal lobe juxtacortical white matter that measures about 4 x 3 x 3 mm  in size. Its mean Hounsfield unit is 78. This is a nonspecific finding  and may be an area of hemosiderin deposition causing  increased density  into an underlying cavernous malformation or sequela of a tiny old  microhemorrhage. In retrospect this is unchanged since 01/31/2019. The  ventricles are normal in size. There are prominent CSF spaces overlying  the anterior lateral frontal lobes likely subdural hygromas without  change. I see no mass effect, no midline shift, and no acute  intracranial hemorrhage is seen. Calcified plaques are present in the  intracranial segments of the distal vertebral arteries, cavernous and  supracavernous segments of the internal carotid arteries bilaterally.  Mild cerebral atrophy is present. The paranasal sinuses, mastoid air  cells and middle ear cavities are clear.  IMPRESSION:  1. No significant change when compared to prior head CT from [facility] 01/31/2019 with no acute abnormality seen.  2. There is mild small vessel disease in the cerebral white matter,  calcified plaques in the intracranial segment of the distal vertebral  arteries, cavernous and supracavernous segments of the internal carotid  arteries bilaterally, mild diffuse cerebral atrophy. There is mild  prominence of subdural spaces over the anterior lateral frontal lobes in  a bilateral symmetric fashion, could be secondary to underlying frontal  lobe volume loss or chronic subdural hygromas felt to be of no acute  significance and unchanged since 01/31/2019.  2. There is a 4 x 3 x 3 mm ovoid mildly hyperdense focus in the  posterior inferior medial right frontal juxtacortical white matter  involving the juxtacortical white matter of the posterior right  cingulate gyrus that has a density of 78 Hounsfield units and in  retrospect was present on prior head CT 01/31/2019. It is probably  increased density related to some hemosiderin deposition, possibly an  underlying cavernous malformation or old microhemorrhage possibly  related to amyloid angiopathy. It could be further assessed with an MRI  of the head if clinically  "indicated. The remainder of the head CT is  Unremarkable.  MocA today: 10/30    Laboratory Results:         Lab Results   Component Value Date    WBC 6.26 12/24/2018    HGB 8.3 (L) 12/24/2018    HCT 25.2 (L) 12/24/2018    MCV 94.4 12/24/2018     12/24/2018     Lab Results   Component Value Date    GLUCOSE 84 12/23/2018    BUN 36 (H) 12/23/2018    CREATININE 5.26 (H) 12/23/2018    EGFRIFNONA 10 (L) 12/23/2018    EGFRIFAFRI  12/23/2018      Comment:      <15 Indicative of kidney failure.    BCR 6.8 (L) 12/23/2018    K 4.1 12/23/2018    CO2 19.8 (L) 12/23/2018    CALCIUM 8.4 (L) 12/23/2018    PROTENTOTREF 6.4 04/10/2015    ALBUMIN 2.30 (L) 12/23/2018    LABIL2 2.2 04/10/2015    AST 19 12/22/2018    ALT 18 12/22/2018     Lab Results   Component Value Date    TSH 2.22 04/10/2015     Lab Results   Component Value Date    HQJOTAOZ71 190 (L) 02/10/2016       Physical Exam:  GENERAL: NAD  HEENT: Normocephalic, atraumatic   COR: RRR  Resp: Even and unlabored  Extremities: No signs of distal embolization.   Skin: No rashes, lesions or ulcers.  Psychiatric: Normal mood and affect.    Neurological:   MS: Alert. Oriented to self, son, \"Snow\", Naming and reading intact. Paucity of speech and some word-finding but otherwise normal. No obvious neglect. Follows commands.  CN: II-XII grossly normal except for decreased visual acuity on right and significantly [place]  Motor: Normal strength and tone throughout.  Sensory: Intact to light touch in arms and legs  Station and Gait: Able to rise from wheelchair with assistance.   Coordination: Normal finger to nose bilaterally    Impression/Plan:  Mr. Brewster presents for f/u of dementia, initially evaluated by Dr. López, doing well on donepezil 10 mg qhs and seroquel 25 mg qhs. We reviewed previous diagnostics and discussed the addition of namenda, as patient is doing well currently, will consider adding at next appt. We reviewed importance of continue physical activity as able, " healthy diet, regular mental activity and social engagement when possible considering Covid. Falls risk prevention also discussed. He will f/u in 6 months, sooner if symptoms warrant.     I spent a total of 45 minutes today in reviewing records, prior diagnostics, examination of patient as well as counseling and educating patient,son regarding diagnoses, symptoms, reviewing diagnostics, pharmacologic treatment options, recommendations, lifestyle modifications, coordination of care and documenting plan of care.      Coding      Dictated using Dragon

## 2021-02-04 NOTE — PATIENT INSTRUCTIONS
Recommended Lifestyle Modifications:   -Regular physical activity (ie Silver Sneakers programs)  -Regular social activity (ie clubs, Shinto groups, etc)  -Regular mental stimulation (ie puzzles, cross-words, crafts, etc)  -Healthy diet (ie Mediterranean or DASH diet)

## 2021-03-04 DIAGNOSIS — Z23 IMMUNIZATION DUE: ICD-10-CM

## 2021-03-08 ENCOUNTER — DOCUMENTATION (OUTPATIENT)
Dept: SPORTS MEDICINE | Facility: CLINIC | Age: 86
End: 2021-03-08

## 2021-03-08 RX ORDER — FAMOTIDINE 40 MG/1
40 TABLET, FILM COATED ORAL DAILY
Qty: 90 TABLET | Refills: 3 | Status: SHIPPED | OUTPATIENT
Start: 2021-03-08 | End: 2022-01-01 | Stop reason: SDUPTHER

## 2021-04-08 ENCOUNTER — OFFICE VISIT (OUTPATIENT)
Dept: CARDIOLOGY | Facility: CLINIC | Age: 86
End: 2021-04-08

## 2021-04-08 VITALS
WEIGHT: 168 LBS | SYSTOLIC BLOOD PRESSURE: 104 MMHG | HEIGHT: 73 IN | HEART RATE: 105 BPM | DIASTOLIC BLOOD PRESSURE: 46 MMHG | BODY MASS INDEX: 22.26 KG/M2

## 2021-04-08 DIAGNOSIS — I47.20 VENTRICULAR TACHYCARDIA (HCC): ICD-10-CM

## 2021-04-08 DIAGNOSIS — I48.20 CHRONIC ATRIAL FIBRILLATION (HCC): Primary | ICD-10-CM

## 2021-04-08 DIAGNOSIS — N18.5 RENAL FAILURE, CHRONIC, STAGE 5 (HCC): ICD-10-CM

## 2021-04-08 PROCEDURE — 99214 OFFICE O/P EST MOD 30 MIN: CPT | Performed by: NURSE PRACTITIONER

## 2021-04-08 PROCEDURE — 93000 ELECTROCARDIOGRAM COMPLETE: CPT | Performed by: NURSE PRACTITIONER

## 2021-04-08 NOTE — PROGRESS NOTES
Date of Office Visit: 21  Encounter Provider: STEPHANIE Prater  Place of Service: [facility] CARDIOLOGY  Patient Name: Shaun Brewster  :1930    Chief Complaint   Patient presents with   • Chronic atrial fibrillation   • Follow-up   :     HPI: Shaun Brewster is a 89 y.o. male  with history of premature ventricular contraction, nonsustained ventricular arrhythmias, normal cardiac catheterization, chronic atrial fibrillation, end-stage renal disease on dialysis, dilated aortic root, and memory issues.    He has been followed by Dr. Ponce.  I will visit with him for the first time today and have reviewed his medical record.    His son is a physician.     He has had issues with premature ventricular contractions, nonsustained ventricular tachycardia in the setting of normal left ventricular systolic function and normal coronary arteries.  He developed chronic atrial fibrillation but could not tolerate  amiodarone.  In 2018 he had an echocardiogram which showed normal left ventricular systolic function, no real significant valvular disease and mildly enlarged aortic root.  He later was switched from warfarin to Xarelto at 15 mg daily.  In 2020 his heart rate was elevated 111 and diltiazem was increased.    He presents today accompanied by one of his sons.  He has been doing well continues to live at home.  They have 24-hour caregivers clear.  Patient denies chest pain, shortness of breath, near-syncope, syncope.  He is not had any recent falls.  He has some occasional dizziness and lightheadedness upon arising in the morning but otherwise no major issues with that.  He takes Xarelto 4 days a week.  He has dialysis .  Overall, he has been doing well.  Allergies   Allergen Reactions   • Nitrofurantoin    • Amiodarone Dizziness           Family and social history reviewed.     ROS  All other systems were reviewed and are negative         "  Objective:     Vitals:    04/08/21 0908   BP: 104/46   BP Location: Right arm   Patient Position: Sitting   Pulse: 105   Weight: 76.2 kg (168 lb)   Height: 185.4 cm (73\")     Body mass index is 22.16 kg/m².    PHYSICAL EXAM:  Constitutional:       General: Not in acute distress.     Appearance: Well-developed. Not diaphoretic.   HENT:      Head: Normocephalic.   Pulmonary:      Effort: Pulmonary effort is normal. No respiratory distress.      Breath sounds: Normal breath sounds. No wheezing. No rhonchi. No rales.   Cardiovascular:      Normal rate. Occasional ectopic beats. Irregularly irregular rhythm.   Pulses:     Radial: 2+ bilaterally.  Skin:     General: Skin is warm and dry. There is no cyanosis.      Findings: No rash.   Neurological:      Mental Status: Alert and oriented to person, place, and time.   Psychiatric:         Behavior: Behavior normal.         Thought Content: Thought content normal.         Judgment: Judgment normal.           ECG 12 Lead    Date/Time: 4/8/2021 9:27 AM  Performed by: Renetta Stein APRN  Authorized by: Renetta Stein APRN   Comparison: compared with previous ECG   Similar to previous ECG  Rhythm: atrial fibrillation  Ectopy: unifocal PVCs  Rate: tachycardic    Clinical impression: abnormal EKG              Current Outpatient Medications   Medication Sig Dispense Refill   • acetaminophen (TYLENOL) 325 MG tablet Take 2 tablets by mouth Every 4 (Four) Hours As Needed for Mild Pain .     • B Complex-C-Folic Acid (RENAL) 1 MG capsule capsule Take 1 capsule by mouth Daily.     • dilTIAZem XR (DILACOR XR) 180 MG 24 hr capsule Take 1 capsule by mouth Every Night. 30 capsule 11   • famotidine (PEPCID) 40 MG tablet Take 1 tablet by mouth Daily. 90 tablet 3   • Fexofenadine HCl (MUCINEX ALLERGY PO) Take  by mouth 2 (Two) Times a Day.     • finasteride (PROSCAR) 5 MG tablet Take 1 tablet by mouth Daily. 90 tablet 3   • lidocaine-prilocaine (EMLA) 2.5-2.5 % cream APPLY SMALL AMOUNT TO " ACCESS SITE (AVF) 1 HOUR BEFORE DIALYSIS. COVER WITH OCCLUSIVE DRESSING (SARAN WRAP)     • Methoxy PEG-Epoetin Beta (MIRCERA IJ) 75 mcg Every 28 (Twenty-Eight) Days.     • Multiple Vitamins-Minerals (PRESERVISION AREDS PO) Take  by mouth 2 (Two) Times a Day.     • ondansetron ODT (ZOFRAN-ODT) 4 MG disintegrating tablet Take 1 tablet by mouth Every 8 (Eight) Hours As Needed for Nausea or Vomiting. 30 tablet 0   • polyethylene glycol (MIRALAX) packet Take 17 g by mouth Every 3 (Three) Days.     • QUEtiapine (SEROquel) 25 MG tablet TAKE 1 TABLET NIGHTLY 90 tablet 0   • RENVELA 800 MG tablet Take 800 mg by mouth 3 (Three) Times a Day. Take 3 tablets x3 daily     • rivaroxaban (Xarelto) 15 MG tablet Take 1 tablet by mouth Daily. (Patient taking differently: Take one tablet my mouth on Monday, Wednesday, Friday and Saturday) 90 tablet 3   • donepezil ODT (ARICEPT ODT) 10 MG disintegrating tablet Place 1 tablet on the tongue Every Night for 90 days. 90 tablet 3     No current facility-administered medications for this visit.     Assessment:       Diagnosis Plan   1. Chronic atrial fibrillation (CMS/HCC)     2. Ventricular tachycardia (CMS/HCC)     3. Renal failure, chronic, stage 5 (CMS/HCC)          No orders of the defined types were placed in this encounter.        Plan:       1. This is an 89-year-old with a history of PVCs, nonsustained ventricular tachycardia, normal LV function, and normal cardiac catheterization. Continue the same. He will see usin follow up in 6 months.  2. Chronic Atrial fibrillation, in atrial fibrillation. He could not tolerate amiodarone.  The patient's CHADS2-VASc score is 3.  His rate is a little faster today will increase his diltiazem.  Previously was on warfarin but switched to Xarelto 15 mg and summer 2020.  He takes Xarelto 4 days a week on Monday, Wednesday, Friday and Saturday.  3. Dilated aortic root.  Echocardiogram December 2018 normal LV systolic function no real significant  valvular disease and aortic root is mildly enlarged.  Would be conservative and not really a candidate for surgical repair.  4.  End-stage renal disease on dialysis Monday Wednesday Friday  5.  Dyspnea/weakness/fatigue.   6.  Memory issues on Aricept.  He requires a caregiver 24/7     Follow-up in 6 months.  When I have him meet with Dr. Shaun Fox.             It has been a pleasure to participate in this patient's care.      Thank you,  STEPHANIE Prater      **I used Dragon to dictate this note:**

## 2021-04-26 DIAGNOSIS — F02.80 LATE ONSET ALZHEIMER'S DISEASE WITHOUT BEHAVIORAL DISTURBANCE (HCC): Primary | ICD-10-CM

## 2021-04-26 DIAGNOSIS — G30.1 LATE ONSET ALZHEIMER'S DISEASE WITHOUT BEHAVIORAL DISTURBANCE (HCC): Primary | ICD-10-CM

## 2021-04-26 RX ORDER — DILTIAZEM HYDROCHLORIDE 180 MG/1
180 CAPSULE, EXTENDED RELEASE ORAL NIGHTLY
Qty: 90 CAPSULE | Refills: 2 | Status: SHIPPED | OUTPATIENT
Start: 2021-04-26 | End: 2022-01-01

## 2021-04-27 RX ORDER — DONEPEZIL HYDROCHLORIDE 10 MG/1
TABLET, FILM COATED ORAL
Qty: 30 TABLET | Refills: 2 | Status: SHIPPED | OUTPATIENT
Start: 2021-04-27 | End: 2021-05-11

## 2021-05-11 ENCOUNTER — OFFICE VISIT (OUTPATIENT)
Dept: NEUROLOGY | Facility: CLINIC | Age: 86
End: 2021-05-11

## 2021-05-11 VITALS
DIASTOLIC BLOOD PRESSURE: 64 MMHG | SYSTOLIC BLOOD PRESSURE: 102 MMHG | HEART RATE: 53 BPM | OXYGEN SATURATION: 98 % | BODY MASS INDEX: 22.26 KG/M2 | WEIGHT: 168 LBS | HEIGHT: 73 IN

## 2021-05-11 DIAGNOSIS — F02.80 LATE ONSET ALZHEIMER'S DISEASE WITHOUT BEHAVIORAL DISTURBANCE (HCC): Primary | ICD-10-CM

## 2021-05-11 DIAGNOSIS — G30.1 LATE ONSET ALZHEIMER'S DISEASE WITHOUT BEHAVIORAL DISTURBANCE (HCC): Primary | ICD-10-CM

## 2021-05-11 PROCEDURE — 99214 OFFICE O/P EST MOD 30 MIN: CPT | Performed by: PSYCHIATRY & NEUROLOGY

## 2021-05-11 RX ORDER — MEMANTINE HYDROCHLORIDE 5 MG-10 MG
KIT ORAL
Qty: 1 EACH | Refills: 0 | Status: SHIPPED | OUTPATIENT
Start: 2021-05-11 | End: 2021-05-13

## 2021-05-11 RX ORDER — DONEPEZIL HYDROCHLORIDE 10 MG/1
10 TABLET, ORALLY DISINTEGRATING ORAL NIGHTLY
Qty: 90 TABLET | Refills: 3 | Status: SHIPPED | OUTPATIENT
Start: 2021-05-11 | End: 2021-01-01

## 2021-05-11 NOTE — PROGRESS NOTES
Chief Complaint   Patient presents with   • Alzheimer's Disease       Patient ID: Shaun Brewster is a 89 y.o. male.    HPI: I had the pleasure of seeing your patient today.  As you may know Shaun is a 89-year-old gentleman with history of dementia.  He has Alzheimer's disease.  We have him on donepezil right now.  He is taking the 10 mg daily.  He has done very well.  He has not had any significant progression according to his son who accompanies him today.  No symptoms of hallucinations.  He denies any interval resting tremor.  He does use a wheelchair to assist with ambulation long distances.  He has caregivers that are with him 24 hours a day unless he is in dialysis.  No episodes of focal weakness or numbness.  He does take Seroquel 25 mg at night.  No episodes of trying to leave the home unannounced.  He eats well and gets plenty of rest.  He does have significant amounts of social interactions daily.    The following portions of the patient's history were reviewed and updated as appropriate: allergies, current medications, past family history, past medical history, past social history, past surgical history and problem list.    Review of Systems   Constitutional: Negative for activity change, appetite change and fatigue.   HENT: Negative for hearing loss, trouble swallowing and voice change.    Eyes: Negative for photophobia, pain and visual disturbance.   Musculoskeletal: Positive for gait problem (balance issues. pt uses walker for short distances. howver pt is in wheelchair today for assistance. ). Negative for back pain and neck pain.   Allergic/Immunologic: Negative for environmental allergies, food allergies and immunocompromised state.   Neurological: Negative for dizziness, tremors, seizures, syncope, facial asymmetry, speech difficulty, weakness, light-headedness, numbness and headaches.   Hematological: Negative for adenopathy. Does not bruise/bleed easily.   Psychiatric/Behavioral: Positive for  confusion. Negative for agitation, behavioral problems, decreased concentration, dysphoric mood, hallucinations, self-injury, sleep disturbance and suicidal ideas. The patient is not nervous/anxious and is not hyperactive.       I have reviewed the review of systems above performed by my medical assistant.      Vitals:    21 0856   BP: 102/64   Pulse: 53   SpO2: 98%       Neurologic Exam     Mental Status   Oriented to person, place, and time.   Concentration: normal.   Level of consciousness: alert  Knowledge: consistent with education (No deficits found.).   Mini-Mental Status evaluation is 23 out of 30 today.     Cranial Nerves     CN II   Visual fields full to confrontation.     CN III, IV, VI   Pupils are equal, round, and reactive to light.  Extraocular motions are normal.   CN III: no CN III palsy  CN VI: no CN VI palsy    CN V   Facial sensation intact.     CN VII   Facial expression full, symmetric.     CN VIII   CN VIII normal.     CN IX, X   CN IX normal.   CN X normal.     CN XI   CN XI normal.     CN XII   CN XII normal.     Motor Exam     Strength   Right neck flexion: 5/5  Left neck flexion: 5/5  Right neck extension: 5/5  Left neck extension: 5/5  Right deltoid: 5/5  Left deltoid: 5/5  Right biceps: 5/5  Left biceps: 5/5  Right triceps: 5/5  Left triceps: 5/5  Right wrist flexion: 5/5  Left wrist flexion: 5/5  Right wrist extension: 5/5  Left wrist extension: 5/5  Right interossei: 5/5  Left interossei: 5/5  Right abdominals: 5/5  Left abdominals: 5/5  Right iliopsoas: 5/5  Left iliopsoas: 5/5  Right quadriceps: 5/5  Left quadriceps: 5/5  Right hamstrin/5  Left hamstrin/5  Right glutei: 5/5  Left glutei: 5/5  Right anterior tibial: 5/5  Left anterior tibial: 5/5  Right posterior tibial: 5/5  Left posterior tibial: 5/5  Right peroneal: 5/5  Left peroneal: 5/5  Right gastroc: 5/5  Left gastroc: 5/5    Sensory Exam   Light touch normal.   Vibration normal.     Gait, Coordination, and  Reflexes     Gait  Gait: normal    Reflexes   Right brachioradialis: 2+  Left brachioradialis: 2+  Right biceps: 2+  Left biceps: 2+  Right triceps: 2+  Left triceps: 2+  Right patellar: 2+  Left patellar: 2+  Right achilles: 2+  Left achilles: 2+  Right : 2+  Left : 2+Station is normal.       Physical Exam  Vitals reviewed.   Constitutional:       Appearance: He is well-developed.   HENT:      Head: Normocephalic and atraumatic.   Eyes:      Extraocular Movements: EOM normal.      Pupils: Pupils are equal, round, and reactive to light.   Cardiovascular:      Rate and Rhythm: Normal rate and regular rhythm.   Pulmonary:      Breath sounds: Normal breath sounds.   Musculoskeletal:         General: Normal range of motion.   Skin:     General: Skin is warm.   Neurological:      Mental Status: He is oriented to person, place, and time.      Gait: Gait is intact.      Deep Tendon Reflexes:      Reflex Scores:       Tricep reflexes are 2+ on the right side and 2+ on the left side.       Bicep reflexes are 2+ on the right side and 2+ on the left side.       Brachioradialis reflexes are 2+ on the right side and 2+ on the left side.       Patellar reflexes are 2+ on the right side and 2+ on the left side.       Achilles reflexes are 2+ on the right side and 2+ on the left side.        Procedures    Assessment/Plan: We are going to start him on Namenda titration to 10 mg twice daily.  Continue donepezil for now as well as the Seroquel at night.  Return to clinic in 4 months or sooner if needed.  A total of 35 minutes was spent face-to-face with the patient today.  Of that greater than 50% of this time spent discussing signs and symptoms of dementia, patient education, plan of care and prognosis.       Diagnoses and all orders for this visit:    1. Late onset Alzheimer's disease without behavioral disturbance (CMS/HCC) (Primary)  -     memantine (Namenda Titration Ken[place]) 28 x 5 MG & 21 x 10 MG tablet pack; Follow package  directions.  Dispense: 1 each; Refill: 0  -     donepezil ODT (ARICEPT ODT) 10 MG disintegrating tablet; Place 1 tablet on the tongue Every Night for 90 days.  Dispense: 90 tablet; Refill: 3           Mike López II, MD

## 2021-05-12 DIAGNOSIS — F02.80 LATE ONSET ALZHEIMER'S DISEASE WITHOUT BEHAVIORAL DISTURBANCE (HCC): ICD-10-CM

## 2021-05-12 DIAGNOSIS — G30.1 LATE ONSET ALZHEIMER'S DISEASE WITHOUT BEHAVIORAL DISTURBANCE (HCC): ICD-10-CM

## 2021-05-13 DIAGNOSIS — F02.80 LATE ONSET ALZHEIMER'S DISEASE WITHOUT BEHAVIORAL DISTURBANCE (HCC): Primary | ICD-10-CM

## 2021-05-13 DIAGNOSIS — G30.1 LATE ONSET ALZHEIMER'S DISEASE WITHOUT BEHAVIORAL DISTURBANCE (HCC): Primary | ICD-10-CM

## 2021-05-13 RX ORDER — QUETIAPINE FUMARATE 25 MG/1
TABLET, FILM COATED ORAL
Qty: 90 TABLET | Refills: 1 | Status: SHIPPED | OUTPATIENT
Start: 2021-05-13 | End: 2021-01-01

## 2021-05-13 NOTE — TELEPHONE ENCOUNTER
Provider: LISA CAMP    Caller: YOUSIF HERNANDEZ    Relationship to Patient: DAUGHTER    Pharmacy: NA    Phone Number: [phone]    Reason for Call:   THE PTS DAUGHTER WANTED TO SEE IF THERE WAS SOMETHING ALTERNATIVE FOR memantine (Namenda Titration Ken[place]) 28 x 5 MG & 21 x 10 MG tablet pack BECAUSE THE INSURANCE HAS REJECTED COVERAGE FOR THIS MEDICATION.     PLEASE ADVISE.

## 2021-05-13 NOTE — TELEPHONE ENCOUNTER
Please review. This patient is seeing Dr. López and this medication was just sent to pharmacy. I didn't know if you had received a PA request on him yet?    Thank you

## 2021-05-13 NOTE — TELEPHONE ENCOUNTER
Please advise. Titration pack is not covered under insurance please advise an approve the separate medications for pt. Thank you.

## 2021-05-14 RX ORDER — MEMANTINE HYDROCHLORIDE 5 MG/1
TABLET ORAL
Qty: 42 TABLET | Refills: 0 | Status: SHIPPED | OUTPATIENT
Start: 2021-05-14 | End: 2021-01-01

## 2021-05-14 RX ORDER — MEMANTINE HYDROCHLORIDE 10 MG/1
10 TABLET ORAL 2 TIMES DAILY
Qty: 60 TABLET | Refills: 3 | Status: SHIPPED | OUTPATIENT
Start: 2021-05-14 | End: 2021-01-01

## 2021-06-16 ENCOUNTER — TELEPHONE (OUTPATIENT)
Dept: NEUROLOGY | Facility: CLINIC | Age: 86
End: 2021-06-16

## 2021-06-16 NOTE — TELEPHONE ENCOUNTER
----- Message from Shaun Brewster sent at 2021  7:23 AM EDT -----  Regarding: Prescription Question  Contact: [phone]  Nic Tellez,  My name is Audra Trujillo and I'm reaching out to you regarding my father (and your patient) Shaun Brewster. ( 30). With his last visit you added the generic medication Namenda to the Aricept to see if it gave any additional help to his memory loss. I feel like we are seeing a lot of side effects from the new medication. This week is the first week of 10mg bid. We are seeing loss of appetite, extreme drowsiness and a bit of moodiness. He's so exhausted he is just staying in his chair and watching TV and dozing. Not interactive very much. Here is the question, do we hang with it for awhile longer to see if the symptoms subside or do we ween him backdown. He's been doing great on the Aricept alone. Thank you!!  Audra Trujillo

## 2021-06-21 NOTE — TELEPHONE ENCOUNTER
Spoke to family today.  He has only been at that higher dose for the past week and 1/2 to 2 weeks.  We will continue with the 10 mg twice daily for now for the next couple of weeks.

## 2021-07-06 ENCOUNTER — TELEPHONE (OUTPATIENT)
Dept: NEUROLOGY | Facility: CLINIC | Age: 86
End: 2021-07-06

## 2021-07-06 DIAGNOSIS — Z91.81 AT RISK FOR FALLS: Primary | ICD-10-CM

## 2021-07-06 DIAGNOSIS — G30.1 LATE ONSET ALZHEIMER'S DISEASE WITHOUT BEHAVIORAL DISTURBANCE (HCC): ICD-10-CM

## 2021-07-06 DIAGNOSIS — F02.80 LATE ONSET ALZHEIMER'S DISEASE WITHOUT BEHAVIORAL DISTURBANCE (HCC): ICD-10-CM

## 2021-07-06 NOTE — TELEPHONE ENCOUNTER
DAUGHTER CALLING TO SEE IF DR WATERMAN HAS SEEN HER MESSAGE. SHE DID STATE THAT HE IS ONLY TAKING A 1/2 TABLET. PLEASE CALL TO ADVISE.

## 2021-07-06 NOTE — TELEPHONE ENCOUNTER
Informed patients daughter that Dr. López is out of the office and a message has been sent to him. She stated understanding.

## 2021-07-06 NOTE — TELEPHONE ENCOUNTER
----- Message from Shaun Brewster sent at 7/4/2021  1:02 PM EDT -----  Regarding: Prescription Question  Contact: [phone]  Nic López- This is Audra Trujillo regarding my father Shaun Brewster again. Just wanted to update you on the Namenda. We spoke a couple of weeks ago about the side effects that he is experiencing- lack of appetite, moodiness & sedation. You had asked that we hang in there with it for a couple of more weeks. The sedation issue has not improved at all & if anything has gotten worse. He does not want to take med & actually refused it this morning. We have seen no improvement & if anything he seems worse since he is so drowsy all the time. We fear a fall. He's not interacting like he was. To be honest, we would like to withdraw the medication (with your blessing of course!).  Please advise us what you think and possibly how to ween down. Thanks

## 2021-07-09 NOTE — TELEPHONE ENCOUNTER
PTS DAUGHTER IS CALLING BACK UNABLE TO WARM TRANSFER DR. WATERMAN WAS IN ROOM WITH PATIENT.     CALL [phone] YOUSIF

## 2021-09-23 NOTE — PROGRESS NOTES
DOS: 2021  NAME: Shaun Brewster   : 1930  PCP: Andrew Trujillo MD    Chief Complaint   Patient presents with   • Alzheimer's Disease      SUBJECTIVE  Neurological Problem:  89 y.o. RHW male with Alzheimers Dementia, Afib, ESRD on HD h/o prostate CA who presents today for f/u of dementia.  He is accompanied by his son Henrique. Patient and problem are new to examiner. History is provided by patient and review of records that are summarized below.     Interval History:   Mr. Brewster was initially evaluated by Dr. López in 2020 for Alzheimer's disease, last seen in May 2021..  Review of records indicates patient had issues with memory for several years prior, has had a decline in short-term memory and functioning in general.  Was having some sleep difficulties, being up all night.  He lives in his own home with 24 care assistance.  CT head done in 2020, no acute findings, showed mild small vessel ischemic changes and mild diffuse cerebral atrophy.  He was started on low-dose Seroquel for his sleeping difficulties which was effective.  He also started on donepezil 10 mg which he tolerated well. He was subsequently started on memantine but did not tolerate and this ws discontinued.     Patient presented today, continues on donepezil 10 mg and seroquel 25 mg qhs and is tolerating well, son states this is helped patient significantly.  He is not on the Namenda due to sedating side effects.  Son states patient's memory is stable, no significant decline. His appetite is good, no problems swallowing or choking.   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  No nighttime wandering's.  He denies any hallucinations.  He has 24-hour care givers at his home.  His son and daughter are also both involved in his care. Patient likes to read westerns but no other real hobbies. He is in a wheelchair today but uses a walker at home. No recent falls.   No history of severe head  injuries.  No known family history of dementia, except for spouse who did have dementia.      Review of Systems:Review of Systems   Constitutional: Positive for fatigue. Negative for activity change and appetite change.   HENT: Negative for ear pain, tinnitus and trouble swallowing.    Eyes: Negative for photophobia, pain and visual disturbance.   Musculoskeletal: Negative for back pain, gait problem and neck pain.   Neurological: Negative for dizziness, tremors, seizures, syncope, facial asymmetry, speech difficulty, weakness, light-headedness, numbness and headaches.   Psychiatric/Behavioral: Negative for agitation, behavioral problems, confusion, decreased concentration, dysphoric mood, hallucinations, self-injury, sleep disturbance and suicidal ideas. The patient is not nervous/anxious and is not hyperactive.     Above ROS reviewed    The following portions of the patient's history were reviewed and updated as appropriate: allergies, current medications, past family history, past medical history, past social history, past surgical history and problem list.    Current Medications:   Current Outpatient Medications:   •  acetaminophen (TYLENOL) 325 MG tablet, Take 2 tablets by mouth Every 4 (Four) Hours As Needed for Mild Pain ., Disp: , Rfl:   •  B Complex-C-Folic Acid (RENAL) 1 MG capsule capsule, Take 1 capsule by mouth Daily., Disp: , Rfl:   •  Cinacalcet HCl (SENSIPAR PO), Take 30 mg by mouth., Disp: , Rfl:   •  dilTIAZem XR (DILACOR XR) 180 MG 24 hr capsule, Take 1 capsule by mouth Every Night., Disp: 90 capsule, Rfl: 2  •  famotidine (PEPCID) 40 MG tablet, Take 1 tablet by mouth Daily., Disp: 90 tablet, Rfl: 3  •  Fexofenadine HCl (MUCINEX ALLERGY PO), Take  by mouth 2 (Two) Times a Day., Disp: , Rfl:   •  finasteride (PROSCAR) 5 MG tablet, TAKE 1 TABLET DAILY, Disp: 90 tablet, Rfl: 3  •  lidocaine-prilocaine (EMLA) 2.5-2.5 % cream, APPLY SMALL AMOUNT TO ACCESS SITE (AVF) 1 HOUR BEFORE DIALYSIS. COVER WITH  OCCLUSIVE DRESSING (SARAN WRAP), Disp: , Rfl:   •  Methoxy PEG-Epoetin Beta (MIRCERA IJ), 75 mcg Every 28 (Twenty-Eight) Days., Disp: , Rfl:   •  Multiple Vitamins-Minerals (PRESERVISION AREDS PO), Take  by mouth 2 (Two) Times a Day., Disp: , Rfl:   •  ondansetron ODT (ZOFRAN-ODT) 4 MG disintegrating tablet, Take 1 tablet by mouth Every 8 (Eight) Hours As Needed for Nausea or Vomiting., Disp: 30 tablet, Rfl: 0  •  polyethylene glycol (MIRALAX) packet, Take 17 g by mouth Every 3 (Three) Days., Disp: , Rfl:   •  QUEtiapine (SEROquel) 25 MG tablet, TAKE 1 TABLET NIGHTLY, Disp: 90 tablet, Rfl: 1  •  RENVELA 800 MG tablet, Take 800 mg by mouth 3 (Three) Times a Day. Take 3 tablets x3 daily, Disp: , Rfl:   •  rivaroxaban (Xarelto) 15 MG tablet, Take 1 tablet by mouth Daily. (Patient taking differently: Take one tablet my mouth on Monday, Wednesday, Friday and Saturday), Disp: 90 tablet, Rfl: 3  •  B Complex-C-Biotin-D-FA (Dialyvite 800 Plus D) 800 MCG wafer, CHEW & SWALLOW 1 TABLET DAILY, Disp: , Rfl:   •  donepezil ODT (ARICEPT ODT) 10 MG disintegrating tablet, Place 1 tablet on the tongue Every Night for 90 days., Disp: 90 tablet, Rfl: 3  **I did not stop or change the above medications.  Patient's medication list was updated to reflect medications they have reported as currently taking, including medication changes made by other providers.    OBJECTIVE  Vitals:    09/23/21 1006   BP: 110/58   Pulse: 52   Resp: 16   SpO2: 98%     Body mass index is 21.9 kg/m².    Diagnostics:  CT head 7/21/2020: FINDINGS: There is some mild patchy low-density in the periventricular  white matter consistent with mild small vessel disease. There is a small  ovoid focus of mild hyperdensity in the posterior inferior medial right  frontal lobe juxtacortical white matter that measures about 4 x 3 x 3 mm  in size. Its mean Hounsfield unit is 78. This is a nonspecific finding  and may be an area of hemosiderin deposition causing increased  density  into an underlying cavernous malformation or sequela of a tiny old  microhemorrhage. In retrospect this is unchanged since 01/31/2019. The  ventricles are normal in size. There are prominent CSF spaces overlying  the anterior lateral frontal lobes likely subdural hygromas without  change. I see no mass effect, no midline shift, and no acute  intracranial hemorrhage is seen. Calcified plaques are present in the  intracranial segments of the distal vertebral arteries, cavernous and  supracavernous segments of the internal carotid arteries bilaterally.  Mild cerebral atrophy is present. The paranasal sinuses, mastoid air  cells and middle ear cavities are clear.   IMPRESSION:  1. No significant change when compared to prior head CT from [facility] 01/31/2019 with no acute abnormality seen.  2. There is mild small vessel disease in the cerebral white matter,  calcified plaques in the intracranial segment of the distal vertebral  arteries, cavernous and supracavernous segments of the internal carotid  arteries bilaterally, mild diffuse cerebral atrophy. There is mild  prominence of subdural spaces over the anterior lateral frontal lobes in  a bilateral symmetric fashion, could be secondary to underlying frontal  lobe volume loss or chronic subdural hygromas felt to be of no acute  significance and unchanged since 01/31/2019.  2. There is a 4 x 3 x 3 mm ovoid mildly hyperdense focus in the  posterior inferior medial right frontal juxtacortical white matter  involving the juxtacortical white matter of the posterior right  cingulate gyrus that has a density of 78 Hounsfield units and in  retrospect was present on prior head CT 01/31/2019. It is probably  increased density related to some hemosiderin deposition, possibly an  underlying cavernous malformation or old microhemorrhage possibly  related to amyloid angiopathy. It could be further assessed with an MRI  of the head if clinically indicated.  The remainder of the head CT is  unremarkable.    Laboratory Results:         Lab Results   Component Value Date    WBC 6.26 12/24/2018    HGB 8.3 (L) 12/24/2018    HCT 25.2 (L) 12/24/2018    MCV 94.4 12/24/2018     12/24/2018     Lab Results   Component Value Date    GLUCOSE 84 12/23/2018    BUN 36 (H) 12/23/2018    CREATININE 5.26 (H) 12/23/2018    EGFRIFNONA 10 (L) 12/23/2018    EGFRIFAFRI  12/23/2018      Comment:      <15 Indicative of kidney failure.    BCR 6.8 (L) 12/23/2018    K 4.1 12/23/2018    CO2 19.8 (L) 12/23/2018    CALCIUM 8.4 (L) 12/23/2018    PROTENTOTREF 6.4 04/10/2015    ALBUMIN 2.30 (L) 12/23/2018    LABIL2 2.2 04/10/2015    AST 19 12/22/2018    ALT 18 12/22/2018     Lab Results   Component Value Date    TSH 2.22 04/10/2015     Lab Results   Component Value Date    ZYQZDTTZ74 190 (L) 02/10/2016     Physical Exam:  GENERAL: NAD, thin  HEENT: Normocephalic, atraumatic   COR: RRR  Resp: Even and unlabored  Extremities: No signs of distal embolization.   Skin: No rashes, lesions or ulcers.  Psychiatric: Normal mood and affect.    Neurological:   MS: Alert. Oriented to self, son. Spontaneous speech normal. No neglect. Follows commands.  CN: II-XII grossly normal except for decreased auditory acuity  Motor: LO purposefully.  Sensory: Intact to light touch in arms and legs  Station and Gait: Deferred, currently in wheelchair  Coordination: Normal finger to nose bilaterally    Impression/Plan:  Mr. Brewster presents for f/u of Alzheimer's dementia, doing well on donepezil 10 mg qhs and seroquel 25 mg. He did not tolerate memantine. He will continue the same. He may also benefit from supplemental nutrition shakes, very low weight seated exercises. Continue to use walker, falls prevention discussed. Also encouraged mental stimulation. He will f/u here in 6 months, sooner if symptoms warrant.       I spent a total of 30 minutes today in reviewing records, prior diagnostics, examination of patient as  well as counseling and educating patient, son regarding diagnoses, symptoms, pharmacologic treatment, recommendations, lifestyle modifications, coordination of care and documenting plan of care.      There are no diagnoses linked to this encounter.    Coding      Dictated using Dragon

## 2021-10-12 NOTE — PROGRESS NOTES
[facility]      Patient Name: Shaun Brewster  :1930  Age: 89 y.o.  Encounter Provider:  Shaun Fox Jr, MD      Chief Complaint: Follow-up chronic atrial fibrillation      HPI  Shaun Brewster is a 89 y.o. male past medical history of nonobstructive coronary artery disease, ventricular ectopy, chronic atrial fibrillation, thoracic aortic aneurysm and end-stage renal disease on dialysis who presents for initial evaluation.  Patient has history of progressive dementia but does relatively well at home.  He lives with his son and takes frequent walks around the block.  He has to use an ambulatory aid but still remains very active.  Denies any chest pain or shortness of air.  No palpitations, dizziness syncope.  No orthopnea, PND or edema.  He does note increased leg fatigue when he is doing his walks but he is able to do them nonetheless.  Blood pressures been well controlled at home.  He is tolerating dialysis well.  No bleeding complications on Xarelto.      The following portions of the patient's history were reviewed and updated as appropriate: allergies, current medications, past family history, past medical history, past social history, past surgical history and problem list.      Review of Systems   Constitutional: Negative for chills and fever.   HENT: Negative for hoarse voice and sore throat.    Eyes: Negative for double vision and photophobia.   Cardiovascular: Negative for chest pain, leg swelling, near-syncope, orthopnea, palpitations, paroxysmal nocturnal dyspnea and syncope.   Respiratory: Negative for cough and wheezing.    Skin: Negative for poor wound healing and rash.   Musculoskeletal: Positive for muscle cramps and muscle weakness. Negative for arthritis and joint swelling.   Gastrointestinal: Negative for bloating, abdominal pain, hematemesis and hematochezia.   Neurological: Negative for dizziness and focal weakness.   Psychiatric/Behavioral: Negative for depression  "and suicidal ideas.       OBJECTIVE:   Vital Signs  Vitals:    10/12/21 1238   BP: 118/60   Pulse: 62     Estimated body mass index is 21.9 kg/m² as calculated from the following:    Height as of this encounter: 185.4 cm (73\").    Weight as of this encounter: 75.3 kg (166 lb).    Vitals reviewed.   Constitutional:       Appearance: Frail. Chronically ill-appearing.   Neck:      Vascular: No JVR. JVD normal.   Pulmonary:      Effort: Pulmonary effort is normal.      Breath sounds: No wheezing. No rhonchi. No rales.   Chest:      Chest wall: Not tender to palpatation.   Cardiovascular:      PMI at left midclavicular line. Normal rate. Irregularly irregular rhythm. Normal S1. Normal S2.      Murmurs: There is no murmur.      No gallop. No click. No rub.   Pulses:     Intact distal pulses.   Edema:     Peripheral edema absent.   Abdominal:      General: Bowel sounds are normal.      Palpations: Abdomen is soft.      Tenderness: There is no abdominal tenderness.   Musculoskeletal: Normal range of motion.         General: No tenderness. Skin:     General: Skin is warm and dry.   Neurological:      General: No focal deficit present.      Comments: Follows commands well           ECG 12 Lead    Date/Time: 10/12/2021 12:59 PM  Performed by: Shaun Fox Jr., MD  Authorized by: Shaun Fox Jr., MD                     ASSESSMENT:     Chronic atrial fibrillation  Thoracic aortic aneurysm  End-stage renal disease on dialysis  Dementia  Ventricular ectopy      PLAN OF CARE:     1. Chronic atrial fibrillation -asymptomatic and tolerating rivaroxaban well.  Continue same.  2. 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  We will long discussion about the utility of further surveillance and he does not wish any further testing nor would he want consideration for surgical intervention.  3. Dementia  4. End-stage renal disease on dialysis  5. Ventricular ectopy    Return to clinic with Renetta Lanier in 6 " months.             Discharge Medications          Accurate as of October 12, 2021 12:38 PM. If you have any questions, ask your nurse or doctor.            Changes to Medications      Instructions Start Date   rivaroxaban 15 MG tablet  Commonly known as: Xarelto  What changed:   · how much to take  · how to take this  · when to take this  · additional instructions   15 mg, Oral, Daily         Continue These Medications      Instructions Start Date   acetaminophen 325 MG tablet  Commonly known as: TYLENOL   650 mg, Oral, Every 4 Hours PRN      Dialyvite 800 Plus D 800 MCG wafer   CHEW & SWALLOW 1 TABLET DAILY      dilTIAZem  MG 24 hr capsule  Commonly known as: DILACOR XR   180 mg, Oral, Nightly      donepezil ODT 10 MG disintegrating tablet  Commonly known as: ARICEPT ODT   10 mg, Translingual, Nightly      famotidine 40 MG tablet  Commonly known as: PEPCID   40 mg, Oral, Daily      finasteride 5 MG tablet  Commonly known as: PROSCAR   TAKE 1 TABLET DAILY      lidocaine-prilocaine 2.5-2.5 % cream  Commonly known as: EMLA   APPLY SMALL AMOUNT TO ACCESS SITE (AVF) 1 HOUR BEFORE DIALYSIS. COVER WITH OCCLUSIVE DRESSING (SARAN WRAP)      MIRCERA IJ   75 mcg, Every 28 Days      MUCINEX ALLERGY PO   Oral, 2 Times Daily      multivitamin with minerals tablet tablet   Oral, 2 Times Daily      ondansetron ODT 4 MG disintegrating tablet  Commonly known as: ZOFRAN-ODT   4 mg, Oral, Every 8 Hours PRN      polyethylene glycol packet  Commonly known as: MIRALAX   17 g, Oral, Every 3 Days      QUEtiapine 25 MG tablet  Commonly known as: SEROquel   TAKE 1 TABLET NIGHTLY      Renal 1 MG capsule capsule   1 capsule, Oral, Daily      Renvela 800 MG tablet  Generic drug: sevelamer   800 mg, Oral, 3 Times Daily, Take 3 tablets x3 daily      SENSIPAR PO   30 mg, Oral             Thank you for allowing me to participate in the care of your patient,      Sincerely,   Shaun Fox MD  [facility]  10/12/21  12:38  EDT

## 2022-01-01 ENCOUNTER — APPOINTMENT (OUTPATIENT)
Dept: CT IMAGING | Facility: HOSPITAL | Age: 87
End: 2022-01-01

## 2022-01-01 ENCOUNTER — OFFICE VISIT (OUTPATIENT)
Dept: NEUROLOGY | Facility: CLINIC | Age: 87
End: 2022-01-01

## 2022-01-01 ENCOUNTER — HOME CARE VISIT (OUTPATIENT)
Dept: HOME HEALTH SERVICES | Facility: HOME HEALTHCARE | Age: 87
End: 2022-01-01

## 2022-01-01 ENCOUNTER — DOCUMENTATION (OUTPATIENT)
Dept: PHYSICAL THERAPY | Facility: CLINIC | Age: 87
End: 2022-01-01

## 2022-01-01 ENCOUNTER — HOSPITAL ENCOUNTER (OUTPATIENT)
Dept: GENERAL RADIOLOGY | Facility: HOSPITAL | Age: 87
Discharge: HOME | End: 2022-04-19
Admitting: FAMILY MEDICINE

## 2022-01-01 ENCOUNTER — APPOINTMENT (OUTPATIENT)
Dept: GENERAL RADIOLOGY | Facility: HOSPITAL | Age: 87
End: 2022-01-01

## 2022-01-01 ENCOUNTER — DOCUMENTATION (OUTPATIENT)
Dept: SPORTS MEDICINE | Facility: CLINIC | Age: 87
End: 2022-01-01

## 2022-01-01 ENCOUNTER — TRANSCRIBE ORDERS (OUTPATIENT)
Dept: HOME HEALTH SERVICES | Facility: HOME HEALTHCARE | Age: 87
End: 2022-01-01

## 2022-01-01 ENCOUNTER — HOSPITAL ENCOUNTER (INPATIENT)
Facility: HOSPITAL | Age: 87
LOS: 4 days | End: 2022-07-08
Attending: EMERGENCY MEDICINE | Admitting: INTERNAL MEDICINE

## 2022-01-01 ENCOUNTER — OFFICE VISIT (OUTPATIENT)
Dept: CARDIOLOGY | Facility: CLINIC | Age: 87
End: 2022-01-01

## 2022-01-01 ENCOUNTER — TELEPHONE (OUTPATIENT)
Dept: SPORTS MEDICINE | Facility: CLINIC | Age: 87
End: 2022-01-01

## 2022-01-01 ENCOUNTER — APPOINTMENT (OUTPATIENT)
Dept: CARDIOLOGY | Facility: HOSPITAL | Age: 87
End: 2022-01-01

## 2022-01-01 ENCOUNTER — HOSPITAL ENCOUNTER (EMERGENCY)
Facility: HOSPITAL | Age: 87
Discharge: HOME | End: 2022-07-02
Attending: EMERGENCY MEDICINE | Admitting: EMERGENCY MEDICINE

## 2022-01-01 ENCOUNTER — HOSPITAL ENCOUNTER (EMERGENCY)
Facility: HOSPITAL | Age: 87
Discharge: HOME | End: 2022-04-21
Attending: EMERGENCY MEDICINE | Admitting: EMERGENCY MEDICINE

## 2022-01-01 ENCOUNTER — HOME HEALTH ADMISSION (OUTPATIENT)
Dept: HOME HEALTH SERVICES | Facility: HOME HEALTHCARE | Age: 87
End: 2022-01-01

## 2022-01-01 ENCOUNTER — HOSPITAL ENCOUNTER (INPATIENT)
Facility: HOSPITAL | Age: 87
LOS: 12 days | Discharge: NURSING, REHAB OR LONG-TERM CARE | End: 2022-06-03
Attending: EMERGENCY MEDICINE | Admitting: INTERNAL MEDICINE

## 2022-01-01 ENCOUNTER — APPOINTMENT (OUTPATIENT)
Dept: MRI IMAGING | Facility: HOSPITAL | Age: 87
End: 2022-01-01

## 2022-01-01 VITALS
TEMPERATURE: 97.7 F | OXYGEN SATURATION: 98 % | SYSTOLIC BLOOD PRESSURE: 109 MMHG | RESPIRATION RATE: 19 BRPM | HEART RATE: 81 BPM | DIASTOLIC BLOOD PRESSURE: 68 MMHG

## 2022-01-01 VITALS
HEIGHT: 60 IN | SYSTOLIC BLOOD PRESSURE: 108 MMHG | WEIGHT: 173.2 LBS | DIASTOLIC BLOOD PRESSURE: 50 MMHG | BODY MASS INDEX: 34 KG/M2 | HEART RATE: 92 BPM

## 2022-01-01 VITALS
RESPIRATION RATE: 18 BRPM | TEMPERATURE: 97.9 F | DIASTOLIC BLOOD PRESSURE: 70 MMHG | OXYGEN SATURATION: 99 % | SYSTOLIC BLOOD PRESSURE: 116 MMHG | HEART RATE: 88 BPM

## 2022-01-01 VITALS
OXYGEN SATURATION: 98 % | SYSTOLIC BLOOD PRESSURE: 113 MMHG | HEART RATE: 95 BPM | DIASTOLIC BLOOD PRESSURE: 92 MMHG | TEMPERATURE: 98.1 F | RESPIRATION RATE: 18 BRPM

## 2022-01-01 VITALS
HEART RATE: 82 BPM | RESPIRATION RATE: 18 BRPM | SYSTOLIC BLOOD PRESSURE: 116 MMHG | OXYGEN SATURATION: 97 % | DIASTOLIC BLOOD PRESSURE: 68 MMHG | TEMPERATURE: 97.5 F

## 2022-01-01 VITALS
RESPIRATION RATE: 24 BRPM | OXYGEN SATURATION: 96 % | SYSTOLIC BLOOD PRESSURE: 142 MMHG | TEMPERATURE: 98.4 F | HEART RATE: 85 BPM | DIASTOLIC BLOOD PRESSURE: 70 MMHG

## 2022-01-01 VITALS
HEART RATE: 137 BPM | RESPIRATION RATE: 16 BRPM | TEMPERATURE: 99.1 F | DIASTOLIC BLOOD PRESSURE: 82 MMHG | SYSTOLIC BLOOD PRESSURE: 144 MMHG | OXYGEN SATURATION: 97 %

## 2022-01-01 VITALS
SYSTOLIC BLOOD PRESSURE: 91 MMHG | WEIGHT: 154.98 LBS | OXYGEN SATURATION: 91 % | BODY MASS INDEX: 19.89 KG/M2 | TEMPERATURE: 99.3 F | DIASTOLIC BLOOD PRESSURE: 67 MMHG | HEIGHT: 74 IN

## 2022-01-01 VITALS
DIASTOLIC BLOOD PRESSURE: 79 MMHG | BODY MASS INDEX: 21.04 KG/M2 | TEMPERATURE: 97.7 F | HEIGHT: 73 IN | RESPIRATION RATE: 16 BRPM | WEIGHT: 158.73 LBS | HEART RATE: 75 BPM | SYSTOLIC BLOOD PRESSURE: 137 MMHG | OXYGEN SATURATION: 94 %

## 2022-01-01 VITALS
HEART RATE: 68 BPM | WEIGHT: 164.2 LBS | BODY MASS INDEX: 21.76 KG/M2 | RESPIRATION RATE: 16 BRPM | HEIGHT: 73 IN | SYSTOLIC BLOOD PRESSURE: 116 MMHG | DIASTOLIC BLOOD PRESSURE: 68 MMHG

## 2022-01-01 VITALS — HEART RATE: 84 BPM | OXYGEN SATURATION: 96 %

## 2022-01-01 VITALS
OXYGEN SATURATION: 97 % | TEMPERATURE: 97.5 F | SYSTOLIC BLOOD PRESSURE: 112 MMHG | RESPIRATION RATE: 18 BRPM | HEART RATE: 80 BPM | DIASTOLIC BLOOD PRESSURE: 68 MMHG

## 2022-01-01 DIAGNOSIS — B34.8 RHINOVIRUS INFECTION: ICD-10-CM

## 2022-01-01 DIAGNOSIS — N18.6 ESRD (END STAGE RENAL DISEASE) ON DIALYSIS: ICD-10-CM

## 2022-01-01 DIAGNOSIS — R05.9 COUGH: Primary | ICD-10-CM

## 2022-01-01 DIAGNOSIS — I47.20 VENTRICULAR TACHYCARDIA: ICD-10-CM

## 2022-01-01 DIAGNOSIS — Z99.2 ESRD ON DIALYSIS: ICD-10-CM

## 2022-01-01 DIAGNOSIS — F02.80 LATE ONSET ALZHEIMER'S DISEASE WITHOUT BEHAVIORAL DISTURBANCE: ICD-10-CM
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
## 2022-01-01 DIAGNOSIS — Z99.2 ESRD (END STAGE RENAL DISEASE) ON DIALYSIS: ICD-10-CM

## 2022-01-01 DIAGNOSIS — R05.9 COUGH: ICD-10-CM

## 2022-01-01 DIAGNOSIS — S72.114A CLOSED NONDISPLACED FRACTURE OF GREATER TROCHANTER OF RIGHT FEMUR, INITIAL ENCOUNTER: Primary | ICD-10-CM

## 2022-01-01 DIAGNOSIS — I77.810 AORTIC ROOT DILATATION: ICD-10-CM

## 2022-01-01 DIAGNOSIS — J30.2 SEASONAL ALLERGIES: Primary | ICD-10-CM

## 2022-01-01 DIAGNOSIS — Z79.01 CHRONIC ANTICOAGULATION: ICD-10-CM

## 2022-01-01 DIAGNOSIS — I48.20 CHRONIC ATRIAL FIBRILLATION: Primary | ICD-10-CM

## 2022-01-01 DIAGNOSIS — S06.5XAA BILATERAL SUBDURAL HEMATOMAS: ICD-10-CM

## 2022-01-01 DIAGNOSIS — G30.1 LATE ONSET ALZHEIMER'S DISEASE WITHOUT BEHAVIORAL DISTURBANCE: Primary | ICD-10-CM

## 2022-01-01 DIAGNOSIS — S72.114A CLOSED NONDISPLACED FRACTURE OF GREATER TROCHANTER OF RIGHT FEMUR, INITIAL ENCOUNTER: ICD-10-CM

## 2022-01-01 DIAGNOSIS — R41.82 ALTERED MENTAL STATUS, UNSPECIFIED ALTERED MENTAL STATUS TYPE: Primary | ICD-10-CM

## 2022-01-01 DIAGNOSIS — S02.85XA ORBITAL WALL FRACTURE, CLOSED, INITIAL ENCOUNTER: Primary | ICD-10-CM

## 2022-01-01 DIAGNOSIS — R06.02 SHORTNESS OF BREATH: Primary | ICD-10-CM

## 2022-01-01 DIAGNOSIS — Z86.59 HISTORY OF DEMENTIA: ICD-10-CM

## 2022-01-01 DIAGNOSIS — N18.6 ESRD (END STAGE RENAL DISEASE): ICD-10-CM

## 2022-01-01 DIAGNOSIS — N18.6 ESRD ON DIALYSIS: ICD-10-CM

## 2022-01-01 DIAGNOSIS — G30.1 LATE ONSET ALZHEIMER'S DISEASE WITHOUT BEHAVIORAL DISTURBANCE: ICD-10-CM

## 2022-01-01 DIAGNOSIS — Z91.81 AT RISK FOR FALLS: ICD-10-CM

## 2022-01-01 DIAGNOSIS — F02.80 LATE ONSET ALZHEIMER'S DISEASE WITHOUT BEHAVIORAL DISTURBANCE: Primary | ICD-10-CM

## 2022-01-01 DIAGNOSIS — I48.91 ATRIAL FIBRILLATION WITH RVR: ICD-10-CM

## 2022-01-01 DIAGNOSIS — J18.9 PNEUMONIA OF BOTH LUNGS DUE TO INFECTIOUS ORGANISM, UNSPECIFIED PART OF LUNG: Primary | ICD-10-CM

## 2022-01-01 LAB
ALBUMIN SERPL-MCNC: 2.9 G/DL (ref 3.5–5.2)
ALBUMIN SERPL-MCNC: 2.9 G/DL (ref 3.5–5.2)
ALBUMIN SERPL-MCNC: 3.1 G/DL (ref 3.5–5.2)
ALBUMIN SERPL-MCNC: 3.2 G/DL (ref 3.5–5.2)
ALBUMIN SERPL-MCNC: 3.4 G/DL (ref 3.5–5.2)
ALBUMIN SERPL-MCNC: 3.6 G/DL (ref 3.5–5.2)
ALBUMIN SERPL-MCNC: 3.7 G/DL (ref 3.5–5.2)
ALBUMIN SERPL-MCNC: 3.8 G/DL (ref 3.5–5.2)
ALBUMIN SERPL-MCNC: 3.9 G/DL (ref 3.5–5.2)
ALBUMIN/GLOB SERPL: 1.3 G/DL
ALBUMIN/GLOB SERPL: 1.3 G/DL
ALBUMIN/GLOB SERPL: 1.5 G/DL
ALBUMIN/GLOB SERPL: 1.7 G/DL
ALP SERPL-CCNC: 110 U/L (ref 39–117)
ALP SERPL-CCNC: 114 U/L (ref 39–117)
ALP SERPL-CCNC: 90 U/L (ref 39–117)
ALP SERPL-CCNC: 99 U/L (ref 39–117)
ALT SERPL W P-5'-P-CCNC: 12 U/L (ref 1–41)
ALT SERPL W P-5'-P-CCNC: 12 U/L (ref 1–41)
ALT SERPL W P-5'-P-CCNC: 18 U/L (ref 1–41)
ALT SERPL W P-5'-P-CCNC: 9 U/L (ref 1–41)
ANION GAP SERPL CALCULATED.3IONS-SCNC: 11.1 MMOL/L (ref 5–15)
ANION GAP SERPL CALCULATED.3IONS-SCNC: 13 MMOL/L (ref 5–15)
ANION GAP SERPL CALCULATED.3IONS-SCNC: 14.9 MMOL/L (ref 5–15)
ANION GAP SERPL CALCULATED.3IONS-SCNC: 15 MMOL/L (ref 5–15)
ANION GAP SERPL CALCULATED.3IONS-SCNC: 15.3 MMOL/L (ref 5–15)
ANION GAP SERPL CALCULATED.3IONS-SCNC: 15.9 MMOL/L (ref 5–15)
ANION GAP SERPL CALCULATED.3IONS-SCNC: 16 MMOL/L (ref 5–15)
ANION GAP SERPL CALCULATED.3IONS-SCNC: 16.2 MMOL/L (ref 5–15)
ANION GAP SERPL CALCULATED.3IONS-SCNC: 17.1 MMOL/L (ref 5–15)
ANION GAP SERPL CALCULATED.3IONS-SCNC: 18 MMOL/L (ref 5–15)
ANION GAP SERPL CALCULATED.3IONS-SCNC: 18.5 MMOL/L (ref 5–15)
ANION GAP SERPL CALCULATED.3IONS-SCNC: 20 MMOL/L (ref 5–15)
ANION GAP SERPL CALCULATED.3IONS-SCNC: 8.4 MMOL/L (ref 5–15)
ANION GAP SERPL CALCULATED.3IONS-SCNC: 9.1 MMOL/L (ref 5–15)
AORTIC DIMENSIONLESS INDEX: 0.5 (DI)
APTT PPP: 51.4 SECONDS (ref 22.7–35.4)
AST SERPL-CCNC: 12 U/L (ref 1–40)
AST SERPL-CCNC: 15 U/L (ref 1–40)
AST SERPL-CCNC: 17 U/L (ref 1–40)
AST SERPL-CCNC: 17 U/L (ref 1–40)
B PARAPERT DNA SPEC QL NAA+PROBE: NOT DETECTED
B PARAPERT DNA SPEC QL NAA+PROBE: NOT DETECTED
B PERT DNA SPEC QL NAA+PROBE: NOT DETECTED
B PERT DNA SPEC QL NAA+PROBE: NOT DETECTED
BACTERIA SPEC AEROBE CULT: NORMAL
BACTERIA SPEC AEROBE CULT: NORMAL
BACTERIA UR QL AUTO: ABNORMAL /HPF
BASOPHILS # BLD AUTO: 0.01 10*3/MM3 (ref 0–0.2)
BASOPHILS # BLD AUTO: 0.02 10*3/MM3 (ref 0–0.2)
BASOPHILS # BLD AUTO: 0.02 10*3/MM3 (ref 0–0.2)
BASOPHILS # BLD AUTO: 0.03 10*3/MM3 (ref 0–0.2)
BASOPHILS # BLD AUTO: 0.04 10*3/MM3 (ref 0–0.2)
BASOPHILS # BLD AUTO: 0.06 10*3/MM3 (ref 0–0.2)
BASOPHILS # BLD AUTO: 0.06 10*3/MM3 (ref 0–0.2)
BASOPHILS NFR BLD AUTO: 0.1 % (ref 0–1.5)
BASOPHILS NFR BLD AUTO: 0.1 % (ref 0–1.5)
BASOPHILS NFR BLD AUTO: 0.2 % (ref 0–1.5)
BASOPHILS NFR BLD AUTO: 0.3 % (ref 0–1.5)
BASOPHILS NFR BLD AUTO: 0.4 % (ref 0–1.5)
BASOPHILS NFR BLD AUTO: 0.5 % (ref 0–1.5)
BASOPHILS NFR BLD AUTO: 0.7 % (ref 0–1.5)
BH CV ECHO MEAS - ACS: 1.68 CM
BH CV ECHO MEAS - AO MAX PG: 16.1 MMHG
BH CV ECHO MEAS - AO MEAN PG: 8.3 MMHG
BH CV ECHO MEAS - AO V2 MAX: 200.9 CM/SEC
BH CV ECHO MEAS - AO V2 VTI: 30 CM
BH CV ECHO MEAS - AVA(I,D): 2.5 CM2
BH CV ECHO MEAS - EDV(CUBED): 163.1 ML
BH CV ECHO MEAS - EDV(MOD-SP2): 162 ML
BH CV ECHO MEAS - EDV(MOD-SP4): 150 ML
BH CV ECHO MEAS - EF(MOD-BP): 30 %
BH CV ECHO MEAS - EF(MOD-SP2): 25.9 %
BH CV ECHO MEAS - EF(MOD-SP4): 36 %
BH CV ECHO MEAS - ESV(CUBED): 93 ML
BH CV ECHO MEAS - ESV(MOD-SP2): 120 ML
BH CV ECHO MEAS - ESV(MOD-SP4): 96 ML
BH CV ECHO MEAS - FS: 17.1 %
BH CV ECHO MEAS - IVS/LVPW: 0.87 CM
BH CV ECHO MEAS - IVSD: 1.31 CM
BH CV ECHO MEAS - LAT PEAK E' VEL: 10.3 CM/SEC
BH CV ECHO MEAS - LV DIASTOLIC VOL/BSA (35-75): 75.6 CM2
BH CV ECHO MEAS - LV MASS(C)D: 336.5 GRAMS
BH CV ECHO MEAS - LV MAX PG: 3.3 MMHG
BH CV ECHO MEAS - LV MEAN PG: 1.37 MMHG
BH CV ECHO MEAS - LV SYSTOLIC VOL/BSA (12-30): 48.4 CM2
BH CV ECHO MEAS - LV V1 MAX: 91.2 CM/SEC
BH CV ECHO MEAS - LV V1 VTI: 14 CM
BH CV ECHO MEAS - LVIDD: 5.5 CM
BH CV ECHO MEAS - LVIDS: 4.5 CM
BH CV ECHO MEAS - LVOT AREA: 5.4 CM2
BH CV ECHO MEAS - LVOT DIAM: 2.6 CM
BH CV ECHO MEAS - LVPWD: 1.5 CM
BH CV ECHO MEAS - MED PEAK E' VEL: 6 CM/SEC
BH CV ECHO MEAS - MR MAX PG: 128.8 MMHG
BH CV ECHO MEAS - MR MAX VEL: 567.4 CM/SEC
BH CV ECHO MEAS - MR MEAN PG: 84 MMHG
BH CV ECHO MEAS - MR MEAN VEL: 438.7 CM/SEC
BH CV ECHO MEAS - MR VTI: 162.2 CM
BH CV ECHO MEAS - MV DEC TIME: 110 MSEC
BH CV ECHO MEAS - MV E MAX VEL: 124.5 CM/SEC
BH CV ECHO MEAS - MV MAX PG: 7.5 MMHG
BH CV ECHO MEAS - MV MEAN PG: 3.7 MMHG
BH CV ECHO MEAS - MV V2 VTI: 25.8 CM
BH CV ECHO MEAS - MVA(VTI): 2.9 CM2
BH CV ECHO MEAS - PULM DIAS VEL: 63.2 CM/SEC
BH CV ECHO MEAS - PULM S/D: 0.65
BH CV ECHO MEAS - PULM SYS VEL: 40.9 CM/SEC
BH CV ECHO MEAS - RAP SYSTOLE: 3 MMHG
BH CV ECHO MEAS - RF(MV,LVOT)(1DIAM): 0.45 CM
BH CV ECHO MEAS - RVSP: 30.7 MMHG
BH CV ECHO MEAS - SI(MOD-SP2): 21.2 ML/M2
BH CV ECHO MEAS - SI(MOD-SP4): 27.2 ML/M2
BH CV ECHO MEAS - SV(LVOT): 76.1 ML
BH CV ECHO MEAS - SV(MOD-SP2): 42 ML
BH CV ECHO MEAS - SV(MOD-SP4): 54 ML
BH CV ECHO MEAS - TAPSE (>1.6): 1.21 CM
BH CV ECHO MEAS - TR MAX PG: 27.7 MMHG
BH CV ECHO MEAS - TR MAX VEL: 263.3 CM/SEC
BH CV ECHO MEASUREMENTS AVERAGE E/E' RATIO: 15.28
BH CV VAS BP RIGHT ARM: NORMAL MMHG
BH CV XLRA - RV BASE: 3.7 CM
BH CV XLRA - RV LENGTH: 9 CM
BH CV XLRA - RV MID: 2.7 CM
BH CV XLRA - TDI S': 9.4 CM/SEC
BILIRUB SERPL-MCNC: 0.4 MG/DL (ref 0–1.2)
BILIRUB SERPL-MCNC: 0.4 MG/DL (ref 0–1.2)
BILIRUB SERPL-MCNC: 0.5 MG/DL (ref 0–1.2)
BILIRUB SERPL-MCNC: 0.5 MG/DL (ref 0–1.2)
BILIRUB UR QL STRIP: NEGATIVE
BUN SERPL-MCNC: 25 MG/DL (ref 8–23)
BUN SERPL-MCNC: 26 MG/DL (ref 8–23)
BUN SERPL-MCNC: 36 MG/DL (ref 8–23)
BUN SERPL-MCNC: 36 MG/DL (ref 8–23)
BUN SERPL-MCNC: 37 MG/DL (ref 8–23)
BUN SERPL-MCNC: 39 MG/DL (ref 8–23)
BUN SERPL-MCNC: 39 MG/DL (ref 8–23)
BUN SERPL-MCNC: 43 MG/DL (ref 8–23)
BUN SERPL-MCNC: 44 MG/DL (ref 8–23)
BUN SERPL-MCNC: 50 MG/DL (ref 8–23)
BUN SERPL-MCNC: 54 MG/DL (ref 8–23)
BUN SERPL-MCNC: 58 MG/DL (ref 8–23)
BUN SERPL-MCNC: 61 MG/DL (ref 8–23)
BUN SERPL-MCNC: 75 MG/DL (ref 8–23)
BUN/CREAT SERPL: 10.7 (ref 7–25)
BUN/CREAT SERPL: 5.5 (ref 7–25)
BUN/CREAT SERPL: 6.5 (ref 7–25)
BUN/CREAT SERPL: 6.7 (ref 7–25)
BUN/CREAT SERPL: 6.8 (ref 7–25)
BUN/CREAT SERPL: 6.9 (ref 7–25)
BUN/CREAT SERPL: 7.1 (ref 7–25)
BUN/CREAT SERPL: 7.2 (ref 7–25)
BUN/CREAT SERPL: 7.4 (ref 7–25)
BUN/CREAT SERPL: 8 (ref 7–25)
BUN/CREAT SERPL: 8.1 (ref 7–25)
BUN/CREAT SERPL: 9.1 (ref 7–25)
BUN/CREAT SERPL: 9.4 (ref 7–25)
BUN/CREAT SERPL: 9.8 (ref 7–25)
C PNEUM DNA NPH QL NAA+NON-PROBE: NOT DETECTED
C PNEUM DNA NPH QL NAA+NON-PROBE: NOT DETECTED
CALCIUM SPEC-SCNC: 10.1 MG/DL (ref 8.2–9.6)
CALCIUM SPEC-SCNC: 9.1 MG/DL (ref 8.2–9.6)
CALCIUM SPEC-SCNC: 9.3 MG/DL (ref 8.2–9.6)
CALCIUM SPEC-SCNC: 9.4 MG/DL (ref 8.2–9.6)
CALCIUM SPEC-SCNC: 9.6 MG/DL (ref 8.2–9.6)
CALCIUM SPEC-SCNC: 9.7 MG/DL (ref 8.2–9.6)
CALCIUM SPEC-SCNC: 9.9 MG/DL (ref 8.2–9.6)
CALCIUM SPEC-SCNC: 9.9 MG/DL (ref 8.2–9.6)
CHLORIDE SERPL-SCNC: 100 MMOL/L (ref 98–107)
CHLORIDE SERPL-SCNC: 101 MMOL/L (ref 98–107)
CHLORIDE SERPL-SCNC: 91 MMOL/L (ref 98–107)
CHLORIDE SERPL-SCNC: 92 MMOL/L (ref 98–107)
CHLORIDE SERPL-SCNC: 95 MMOL/L (ref 98–107)
CHLORIDE SERPL-SCNC: 96 MMOL/L (ref 98–107)
CHLORIDE SERPL-SCNC: 98 MMOL/L (ref 98–107)
CHLORIDE SERPL-SCNC: 99 MMOL/L (ref 98–107)
CLARITY UR: CLEAR
CO2 SERPL-SCNC: 21.5 MMOL/L (ref 22–29)
CO2 SERPL-SCNC: 22 MMOL/L (ref 22–29)
CO2 SERPL-SCNC: 23.8 MMOL/L (ref 22–29)
CO2 SERPL-SCNC: 23.9 MMOL/L (ref 22–29)
CO2 SERPL-SCNC: 24 MMOL/L (ref 22–29)
CO2 SERPL-SCNC: 24 MMOL/L (ref 22–29)
CO2 SERPL-SCNC: 25.1 MMOL/L (ref 22–29)
CO2 SERPL-SCNC: 25.7 MMOL/L (ref 22–29)
CO2 SERPL-SCNC: 26 MMOL/L (ref 22–29)
CO2 SERPL-SCNC: 26 MMOL/L (ref 22–29)
CO2 SERPL-SCNC: 26.1 MMOL/L (ref 22–29)
CO2 SERPL-SCNC: 26.9 MMOL/L (ref 22–29)
CO2 SERPL-SCNC: 28.6 MMOL/L (ref 22–29)
CO2 SERPL-SCNC: 28.9 MMOL/L (ref 22–29)
COLOR UR: YELLOW
CREAT SERPL-MCNC: 3.95 MG/DL (ref 0.76–1.27)
CREAT SERPL-MCNC: 4.01 MG/DL (ref 0.76–1.27)
CREAT SERPL-MCNC: 4.44 MG/DL (ref 0.76–1.27)
CREAT SERPL-MCNC: 4.54 MG/DL (ref 0.76–1.27)
CREAT SERPL-MCNC: 5.36 MG/DL (ref 0.76–1.27)
CREAT SERPL-MCNC: 5.5 MG/DL (ref 0.76–1.27)
CREAT SERPL-MCNC: 5.51 MG/DL (ref 0.76–1.27)
CREAT SERPL-MCNC: 5.84 MG/DL (ref 0.76–1.27)
CREAT SERPL-MCNC: 6.16 MG/DL (ref 0.76–1.27)
CREAT SERPL-MCNC: 6.24 MG/DL (ref 0.76–1.27)
CREAT SERPL-MCNC: 6.37 MG/DL (ref 0.76–1.27)
CREAT SERPL-MCNC: 6.99 MG/DL (ref 0.76–1.27)
CREAT SERPL-MCNC: 7.03 MG/DL (ref 0.76–1.27)
CREAT SERPL-MCNC: 7.28 MG/DL (ref 0.76–1.27)
D-LACTATE SERPL-SCNC: 2 MMOL/L (ref 0.5–2)
D-LACTATE SERPL-SCNC: 2.1 MMOL/L (ref 0.5–2)
D-LACTATE SERPL-SCNC: 2.3 MMOL/L (ref 0.5–2)
D-LACTATE SERPL-SCNC: 2.3 MMOL/L (ref 0.5–2)
D-LACTATE SERPL-SCNC: 2.5 MMOL/L (ref 0.5–2)
DEPRECATED RDW RBC AUTO: 53.6 FL (ref 37–54)
DEPRECATED RDW RBC AUTO: 53.7 FL (ref 37–54)
DEPRECATED RDW RBC AUTO: 54.5 FL (ref 37–54)
DEPRECATED RDW RBC AUTO: 54.6 FL (ref 37–54)
DEPRECATED RDW RBC AUTO: 55.2 FL (ref 37–54)
DEPRECATED RDW RBC AUTO: 55.4 FL (ref 37–54)
DEPRECATED RDW RBC AUTO: 55.4 FL (ref 37–54)
DEPRECATED RDW RBC AUTO: 55.8 FL (ref 37–54)
DEPRECATED RDW RBC AUTO: 55.9 FL (ref 37–54)
DEPRECATED RDW RBC AUTO: 55.9 FL (ref 37–54)
DEPRECATED RDW RBC AUTO: 57.6 FL (ref 37–54)
DEPRECATED RDW RBC AUTO: 58.5 FL (ref 37–54)
DEPRECATED RDW RBC AUTO: 59.2 FL (ref 37–54)
DEPRECATED RDW RBC AUTO: 59.2 FL (ref 37–54)
EGFRCR SERPLBLD CKD-EPI 2021: 11.5 ML/MIN/1.73
EGFRCR SERPLBLD CKD-EPI 2021: 11.8 ML/MIN/1.73
EGFRCR SERPLBLD CKD-EPI 2021: 13.4 ML/MIN/1.73
EGFRCR SERPLBLD CKD-EPI 2021: 13.7 ML/MIN/1.73
EGFRCR SERPLBLD CKD-EPI 2021: 6.5 ML/MIN/1.73
EGFRCR SERPLBLD CKD-EPI 2021: 6.8 ML/MIN/1.73
EGFRCR SERPLBLD CKD-EPI 2021: 6.8 ML/MIN/1.73
EGFRCR SERPLBLD CKD-EPI 2021: 7.7 ML/MIN/1.73
EGFRCR SERPLBLD CKD-EPI 2021: 7.9 ML/MIN/1.73
EGFRCR SERPLBLD CKD-EPI 2021: 8 ML/MIN/1.73
EGFRCR SERPLBLD CKD-EPI 2021: 8.5 ML/MIN/1.73
EGFRCR SERPLBLD CKD-EPI 2021: 9.1 ML/MIN/1.73
EGFRCR SERPLBLD CKD-EPI 2021: 9.2 ML/MIN/1.73
EGFRCR SERPLBLD CKD-EPI 2021: 9.5 ML/MIN/1.73
EOSINOPHIL # BLD AUTO: 0 10*3/MM3 (ref 0–0.4)
EOSINOPHIL # BLD AUTO: 0.04 10*3/MM3 (ref 0–0.4)
EOSINOPHIL # BLD AUTO: 0.05 10*3/MM3 (ref 0–0.4)
EOSINOPHIL # BLD AUTO: 0.06 10*3/MM3 (ref 0–0.4)
EOSINOPHIL # BLD AUTO: 0.12 10*3/MM3 (ref 0–0.4)
EOSINOPHIL # BLD AUTO: 0.17 10*3/MM3 (ref 0–0.4)
EOSINOPHIL # BLD AUTO: 0.22 10*3/MM3 (ref 0–0.4)
EOSINOPHIL # BLD AUTO: 0.22 10*3/MM3 (ref 0–0.4)
EOSINOPHIL # BLD AUTO: 0.23 10*3/MM3 (ref 0–0.4)
EOSINOPHIL # BLD AUTO: 0.28 10*3/MM3 (ref 0–0.4)
EOSINOPHIL # BLD AUTO: 0.29 10*3/MM3 (ref 0–0.4)
EOSINOPHIL NFR BLD AUTO: 0 % (ref 0.3–6.2)
EOSINOPHIL NFR BLD AUTO: 0.3 % (ref 0.3–6.2)
EOSINOPHIL NFR BLD AUTO: 0.3 % (ref 0.3–6.2)
EOSINOPHIL NFR BLD AUTO: 0.4 % (ref 0.3–6.2)
EOSINOPHIL NFR BLD AUTO: 0.4 % (ref 0.3–6.2)
EOSINOPHIL NFR BLD AUTO: 0.5 % (ref 0.3–6.2)
EOSINOPHIL NFR BLD AUTO: 1.3 % (ref 0.3–6.2)
EOSINOPHIL NFR BLD AUTO: 2 % (ref 0.3–6.2)
EOSINOPHIL NFR BLD AUTO: 2.2 % (ref 0.3–6.2)
EOSINOPHIL NFR BLD AUTO: 2.4 % (ref 0.3–6.2)
EOSINOPHIL NFR BLD AUTO: 2.6 % (ref 0.3–6.2)
EOSINOPHIL NFR BLD AUTO: 2.9 % (ref 0.3–6.2)
EOSINOPHIL NFR BLD AUTO: 3.9 % (ref 0.3–6.2)
ERYTHROCYTE [DISTWIDTH] IN BLOOD BY AUTOMATED COUNT: 15.5 % (ref 12.3–15.4)
ERYTHROCYTE [DISTWIDTH] IN BLOOD BY AUTOMATED COUNT: 16 % (ref 12.3–15.4)
ERYTHROCYTE [DISTWIDTH] IN BLOOD BY AUTOMATED COUNT: 16.1 % (ref 12.3–15.4)
ERYTHROCYTE [DISTWIDTH] IN BLOOD BY AUTOMATED COUNT: 16.4 % (ref 12.3–15.4)
ERYTHROCYTE [DISTWIDTH] IN BLOOD BY AUTOMATED COUNT: 16.5 % (ref 12.3–15.4)
ERYTHROCYTE [DISTWIDTH] IN BLOOD BY AUTOMATED COUNT: 16.6 % (ref 12.3–15.4)
ERYTHROCYTE [DISTWIDTH] IN BLOOD BY AUTOMATED COUNT: 16.7 % (ref 12.3–15.4)
ERYTHROCYTE [DISTWIDTH] IN BLOOD BY AUTOMATED COUNT: 16.7 % (ref 12.3–15.4)
ERYTHROCYTE [DISTWIDTH] IN BLOOD BY AUTOMATED COUNT: 16.8 % (ref 12.3–15.4)
ERYTHROCYTE [DISTWIDTH] IN BLOOD BY AUTOMATED COUNT: 17 % (ref 12.3–15.4)
FLUAV SUBTYP SPEC NAA+PROBE: NOT DETECTED
FLUAV SUBTYP SPEC NAA+PROBE: NOT DETECTED
FLUBV RNA ISLT QL NAA+PROBE: NOT DETECTED
FLUBV RNA ISLT QL NAA+PROBE: NOT DETECTED
GLOBULIN UR ELPH-MCNC: 2.1 GM/DL
GLOBULIN UR ELPH-MCNC: 2.4 GM/DL
GLOBULIN UR ELPH-MCNC: 2.9 GM/DL
GLOBULIN UR ELPH-MCNC: 3 GM/DL
GLUCOSE BLDC GLUCOMTR-MCNC: 134 MG/DL (ref 70–130)
GLUCOSE SERPL-MCNC: 100 MG/DL (ref 65–99)
GLUCOSE SERPL-MCNC: 106 MG/DL (ref 65–99)
GLUCOSE SERPL-MCNC: 112 MG/DL (ref 65–99)
GLUCOSE SERPL-MCNC: 118 MG/DL (ref 65–99)
GLUCOSE SERPL-MCNC: 124 MG/DL (ref 65–99)
GLUCOSE SERPL-MCNC: 133 MG/DL (ref 65–99)
GLUCOSE SERPL-MCNC: 162 MG/DL (ref 65–99)
GLUCOSE SERPL-MCNC: 86 MG/DL (ref 65–99)
GLUCOSE SERPL-MCNC: 89 MG/DL (ref 65–99)
GLUCOSE SERPL-MCNC: 90 MG/DL (ref 65–99)
GLUCOSE SERPL-MCNC: 92 MG/DL (ref 65–99)
GLUCOSE SERPL-MCNC: 92 MG/DL (ref 65–99)
GLUCOSE SERPL-MCNC: 97 MG/DL (ref 65–99)
GLUCOSE SERPL-MCNC: 98 MG/DL (ref 65–99)
GLUCOSE UR STRIP-MCNC: ABNORMAL MG/DL
HADV DNA SPEC NAA+PROBE: NOT DETECTED
HADV DNA SPEC NAA+PROBE: NOT DETECTED
HCOV 229E RNA SPEC QL NAA+PROBE: NOT DETECTED
HCOV 229E RNA SPEC QL NAA+PROBE: NOT DETECTED
HCOV HKU1 RNA SPEC QL NAA+PROBE: NOT DETECTED
HCOV HKU1 RNA SPEC QL NAA+PROBE: NOT DETECTED
HCOV NL63 RNA SPEC QL NAA+PROBE: NOT DETECTED
HCOV NL63 RNA SPEC QL NAA+PROBE: NOT DETECTED
HCOV OC43 RNA SPEC QL NAA+PROBE: NOT DETECTED
HCOV OC43 RNA SPEC QL NAA+PROBE: NOT DETECTED
HCT VFR BLD AUTO: 23.4 % (ref 37.5–51)
HCT VFR BLD AUTO: 23.7 % (ref 37.5–51)
HCT VFR BLD AUTO: 24 % (ref 37.5–51)
HCT VFR BLD AUTO: 24.4 % (ref 37.5–51)
HCT VFR BLD AUTO: 25.7 % (ref 37.5–51)
HCT VFR BLD AUTO: 25.8 % (ref 37.5–51)
HCT VFR BLD AUTO: 26.1 % (ref 37.5–51)
HCT VFR BLD AUTO: 26.6 % (ref 37.5–51)
HCT VFR BLD AUTO: 27 % (ref 37.5–51)
HCT VFR BLD AUTO: 27.1 % (ref 37.5–51)
HCT VFR BLD AUTO: 27.6 % (ref 37.5–51)
HCT VFR BLD AUTO: 28.9 % (ref 37.5–51)
HCT VFR BLD AUTO: 29.1 % (ref 37.5–51)
HCT VFR BLD AUTO: 31.4 % (ref 37.5–51)
HGB BLD-MCNC: 10 G/DL (ref 13–17.7)
HGB BLD-MCNC: 10.1 G/DL (ref 13–17.7)
HGB BLD-MCNC: 7.5 G/DL (ref 13–17.7)
HGB BLD-MCNC: 7.8 G/DL (ref 13–17.7)
HGB BLD-MCNC: 7.8 G/DL (ref 13–17.7)
HGB BLD-MCNC: 8.1 G/DL (ref 13–17.7)
HGB BLD-MCNC: 8.4 G/DL (ref 13–17.7)
HGB BLD-MCNC: 8.4 G/DL (ref 13–17.7)
HGB BLD-MCNC: 8.6 G/DL (ref 13–17.7)
HGB BLD-MCNC: 9 G/DL (ref 13–17.7)
HGB BLD-MCNC: 9 G/DL (ref 13–17.7)
HGB BLD-MCNC: 9.1 G/DL (ref 13–17.7)
HGB BLD-MCNC: 9.2 G/DL (ref 13–17.7)
HGB BLD-MCNC: 9.2 G/DL (ref 13–17.7)
HGB UR QL STRIP.AUTO: NEGATIVE
HMPV RNA NPH QL NAA+NON-PROBE: NOT DETECTED
HMPV RNA NPH QL NAA+NON-PROBE: NOT DETECTED
HOLD SPECIMEN: NORMAL
HPIV1 RNA ISLT QL NAA+PROBE: NOT DETECTED
HPIV1 RNA ISLT QL NAA+PROBE: NOT DETECTED
HPIV2 RNA SPEC QL NAA+PROBE: NOT DETECTED
HPIV2 RNA SPEC QL NAA+PROBE: NOT DETECTED
HPIV3 RNA NPH QL NAA+PROBE: NOT DETECTED
HPIV3 RNA NPH QL NAA+PROBE: NOT DETECTED
HPIV4 P GENE NPH QL NAA+PROBE: NOT DETECTED
HPIV4 P GENE NPH QL NAA+PROBE: NOT DETECTED
HYALINE CASTS UR QL AUTO: ABNORMAL /LPF
IMM GRANULOCYTES # BLD AUTO: 0.04 10*3/MM3 (ref 0–0.05)
IMM GRANULOCYTES # BLD AUTO: 0.05 10*3/MM3 (ref 0–0.05)
IMM GRANULOCYTES # BLD AUTO: 0.05 10*3/MM3 (ref 0–0.05)
IMM GRANULOCYTES # BLD AUTO: 0.06 10*3/MM3 (ref 0–0.05)
IMM GRANULOCYTES # BLD AUTO: 0.07 10*3/MM3 (ref 0–0.05)
IMM GRANULOCYTES # BLD AUTO: 0.08 10*3/MM3 (ref 0–0.05)
IMM GRANULOCYTES # BLD AUTO: 0.09 10*3/MM3 (ref 0–0.05)
IMM GRANULOCYTES # BLD AUTO: 0.15 10*3/MM3 (ref 0–0.05)
IMM GRANULOCYTES NFR BLD AUTO: 0.3 % (ref 0–0.5)
IMM GRANULOCYTES NFR BLD AUTO: 0.3 % (ref 0–0.5)
IMM GRANULOCYTES NFR BLD AUTO: 0.4 % (ref 0–0.5)
IMM GRANULOCYTES NFR BLD AUTO: 0.4 % (ref 0–0.5)
IMM GRANULOCYTES NFR BLD AUTO: 0.5 % (ref 0–0.5)
IMM GRANULOCYTES NFR BLD AUTO: 0.5 % (ref 0–0.5)
IMM GRANULOCYTES NFR BLD AUTO: 0.6 % (ref 0–0.5)
IMM GRANULOCYTES NFR BLD AUTO: 0.7 % (ref 0–0.5)
IMM GRANULOCYTES NFR BLD AUTO: 0.7 % (ref 0–0.5)
IMM GRANULOCYTES NFR BLD AUTO: 0.8 % (ref 0–0.5)
IMM GRANULOCYTES NFR BLD AUTO: 0.8 % (ref 0–0.5)
INR PPP: 1.39 (ref 0.9–1.1)
INR PPP: 1.48 (ref 0.9–1.1)
KETONES UR QL STRIP: NEGATIVE
L PNEUMO1 AG UR QL IA: NEGATIVE
LEFT ATRIUM VOLUME INDEX: 53.5 ML/M2
LEUKOCYTE ESTERASE UR QL STRIP.AUTO: NEGATIVE
LIPASE SERPL-CCNC: 43 U/L (ref 13–60)
LV EF 2D ECHO EST: 30 %
LYMPHOCYTES # BLD AUTO: 0.23 10*3/MM3 (ref 0.7–3.1)
LYMPHOCYTES # BLD AUTO: 0.28 10*3/MM3 (ref 0.7–3.1)
LYMPHOCYTES # BLD AUTO: 0.32 10*3/MM3 (ref 0.7–3.1)
LYMPHOCYTES # BLD AUTO: 0.45 10*3/MM3 (ref 0.7–3.1)
LYMPHOCYTES # BLD AUTO: 0.54 10*3/MM3 (ref 0.7–3.1)
LYMPHOCYTES # BLD AUTO: 0.56 10*3/MM3 (ref 0.7–3.1)
LYMPHOCYTES # BLD AUTO: 0.64 10*3/MM3 (ref 0.7–3.1)
LYMPHOCYTES # BLD AUTO: 0.69 10*3/MM3 (ref 0.7–3.1)
LYMPHOCYTES # BLD AUTO: 0.69 10*3/MM3 (ref 0.7–3.1)
LYMPHOCYTES # BLD AUTO: 0.7 10*3/MM3 (ref 0.7–3.1)
LYMPHOCYTES # BLD AUTO: 0.74 10*3/MM3 (ref 0.7–3.1)
LYMPHOCYTES # BLD AUTO: 0.78 10*3/MM3 (ref 0.7–3.1)
LYMPHOCYTES # BLD AUTO: 0.93 10*3/MM3 (ref 0.7–3.1)
LYMPHOCYTES NFR BLD AUTO: 1.2 % (ref 19.6–45.3)
LYMPHOCYTES NFR BLD AUTO: 1.9 % (ref 19.6–45.3)
LYMPHOCYTES NFR BLD AUTO: 13 % (ref 19.6–45.3)
LYMPHOCYTES NFR BLD AUTO: 2.1 % (ref 19.6–45.3)
LYMPHOCYTES NFR BLD AUTO: 2.9 % (ref 19.6–45.3)
LYMPHOCYTES NFR BLD AUTO: 3.9 % (ref 19.6–45.3)
LYMPHOCYTES NFR BLD AUTO: 6.3 % (ref 19.6–45.3)
LYMPHOCYTES NFR BLD AUTO: 6.7 % (ref 19.6–45.3)
LYMPHOCYTES NFR BLD AUTO: 7.1 % (ref 19.6–45.3)
LYMPHOCYTES NFR BLD AUTO: 7.4 % (ref 19.6–45.3)
LYMPHOCYTES NFR BLD AUTO: 7.8 % (ref 19.6–45.3)
LYMPHOCYTES NFR BLD AUTO: 7.8 % (ref 19.6–45.3)
LYMPHOCYTES NFR BLD AUTO: 8 % (ref 19.6–45.3)
M PNEUMO IGG SER IA-ACNC: NOT DETECTED
M PNEUMO IGG SER IA-ACNC: NOT DETECTED
MAGNESIUM SERPL-MCNC: 2.3 MG/DL (ref 1.7–2.3)
MAGNESIUM SERPL-MCNC: 2.4 MG/DL (ref 1.7–2.3)
MAXIMAL PREDICTED HEART RATE: 129 BPM
MCH RBC QN AUTO: 30 PG (ref 26.6–33)
MCH RBC QN AUTO: 30.7 PG (ref 26.6–33)
MCH RBC QN AUTO: 30.9 PG (ref 26.6–33)
MCH RBC QN AUTO: 31 PG (ref 26.6–33)
MCH RBC QN AUTO: 31 PG (ref 26.6–33)
MCH RBC QN AUTO: 31.1 PG (ref 26.6–33)
MCH RBC QN AUTO: 31.2 PG (ref 26.6–33)
MCH RBC QN AUTO: 31.3 PG (ref 26.6–33)
MCH RBC QN AUTO: 31.6 PG (ref 26.6–33)
MCH RBC QN AUTO: 31.6 PG (ref 26.6–33)
MCH RBC QN AUTO: 31.9 PG (ref 26.6–33)
MCHC RBC AUTO-ENTMCNC: 31.8 G/DL (ref 31.5–35.7)
MCHC RBC AUTO-ENTMCNC: 31.8 G/DL (ref 31.5–35.7)
MCHC RBC AUTO-ENTMCNC: 32.1 G/DL (ref 31.5–35.7)
MCHC RBC AUTO-ENTMCNC: 32.2 G/DL (ref 31.5–35.7)
MCHC RBC AUTO-ENTMCNC: 32.5 G/DL (ref 31.5–35.7)
MCHC RBC AUTO-ENTMCNC: 32.6 G/DL (ref 31.5–35.7)
MCHC RBC AUTO-ENTMCNC: 32.9 G/DL (ref 31.5–35.7)
MCHC RBC AUTO-ENTMCNC: 33 G/DL (ref 31.5–35.7)
MCHC RBC AUTO-ENTMCNC: 33.2 G/DL (ref 31.5–35.7)
MCHC RBC AUTO-ENTMCNC: 33.2 G/DL (ref 31.5–35.7)
MCHC RBC AUTO-ENTMCNC: 33.3 G/DL (ref 31.5–35.7)
MCHC RBC AUTO-ENTMCNC: 33.5 G/DL (ref 31.5–35.7)
MCHC RBC AUTO-ENTMCNC: 34.6 G/DL (ref 31.5–35.7)
MCHC RBC AUTO-ENTMCNC: 34.7 G/DL (ref 31.5–35.7)
MCV RBC AUTO: 91.4 FL (ref 79–97)
MCV RBC AUTO: 91.8 FL (ref 79–97)
MCV RBC AUTO: 91.8 FL (ref 79–97)
MCV RBC AUTO: 92.8 FL (ref 79–97)
MCV RBC AUTO: 93.5 FL (ref 79–97)
MCV RBC AUTO: 93.6 FL (ref 79–97)
MCV RBC AUTO: 93.8 FL (ref 79–97)
MCV RBC AUTO: 94 FL (ref 79–97)
MCV RBC AUTO: 95.6 FL (ref 79–97)
MCV RBC AUTO: 95.6 FL (ref 79–97)
MCV RBC AUTO: 95.8 FL (ref 79–97)
MCV RBC AUTO: 97 FL (ref 79–97)
MCV RBC AUTO: 97.5 FL (ref 79–97)
MCV RBC AUTO: 98.3 FL (ref 79–97)
MONOCYTES # BLD AUTO: 0.51 10*3/MM3 (ref 0.1–0.9)
MONOCYTES # BLD AUTO: 0.51 10*3/MM3 (ref 0.1–0.9)
MONOCYTES # BLD AUTO: 0.56 10*3/MM3 (ref 0.1–0.9)
MONOCYTES # BLD AUTO: 0.58 10*3/MM3 (ref 0.1–0.9)
MONOCYTES # BLD AUTO: 0.59 10*3/MM3 (ref 0.1–0.9)
MONOCYTES # BLD AUTO: 0.66 10*3/MM3 (ref 0.1–0.9)
MONOCYTES # BLD AUTO: 0.68 10*3/MM3 (ref 0.1–0.9)
MONOCYTES # BLD AUTO: 0.75 10*3/MM3 (ref 0.1–0.9)
MONOCYTES # BLD AUTO: 0.87 10*3/MM3 (ref 0.1–0.9)
MONOCYTES # BLD AUTO: 0.9 10*3/MM3 (ref 0.1–0.9)
MONOCYTES # BLD AUTO: 0.97 10*3/MM3 (ref 0.1–0.9)
MONOCYTES # BLD AUTO: 1.08 10*3/MM3 (ref 0.1–0.9)
MONOCYTES # BLD AUTO: 1.13 10*3/MM3 (ref 0.1–0.9)
MONOCYTES NFR BLD AUTO: 12.5 % (ref 5–12)
MONOCYTES NFR BLD AUTO: 4.2 % (ref 5–12)
MONOCYTES NFR BLD AUTO: 5.1 % (ref 5–12)
MONOCYTES NFR BLD AUTO: 5.2 % (ref 5–12)
MONOCYTES NFR BLD AUTO: 5.6 % (ref 5–12)
MONOCYTES NFR BLD AUTO: 5.7 % (ref 5–12)
MONOCYTES NFR BLD AUTO: 5.8 % (ref 5–12)
MONOCYTES NFR BLD AUTO: 6.3 % (ref 5–12)
MONOCYTES NFR BLD AUTO: 6.5 % (ref 5–12)
MONOCYTES NFR BLD AUTO: 6.6 % (ref 5–12)
MONOCYTES NFR BLD AUTO: 6.8 % (ref 5–12)
MONOCYTES NFR BLD AUTO: 8.2 % (ref 5–12)
MONOCYTES NFR BLD AUTO: 9.7 % (ref 5–12)
MRSA DNA SPEC QL NAA+PROBE: NORMAL
NEUTROPHILS NFR BLD AUTO: 11.85 10*3/MM3 (ref 1.7–7)
NEUTROPHILS NFR BLD AUTO: 12.82 10*3/MM3 (ref 1.7–7)
NEUTROPHILS NFR BLD AUTO: 14.27 10*3/MM3 (ref 1.7–7)
NEUTROPHILS NFR BLD AUTO: 15.31 10*3/MM3 (ref 1.7–7)
NEUTROPHILS NFR BLD AUTO: 17.97 10*3/MM3 (ref 1.7–7)
NEUTROPHILS NFR BLD AUTO: 5.29 10*3/MM3 (ref 1.7–7)
NEUTROPHILS NFR BLD AUTO: 7.03 10*3/MM3 (ref 1.7–7)
NEUTROPHILS NFR BLD AUTO: 7.07 10*3/MM3 (ref 1.7–7)
NEUTROPHILS NFR BLD AUTO: 7.18 10*3/MM3 (ref 1.7–7)
NEUTROPHILS NFR BLD AUTO: 7.48 10*3/MM3 (ref 1.7–7)
NEUTROPHILS NFR BLD AUTO: 73.9 % (ref 42.7–76)
NEUTROPHILS NFR BLD AUTO: 78 % (ref 42.7–76)
NEUTROPHILS NFR BLD AUTO: 8.16 10*3/MM3 (ref 1.7–7)
NEUTROPHILS NFR BLD AUTO: 8.31 10*3/MM3 (ref 1.7–7)
NEUTROPHILS NFR BLD AUTO: 8.59 10*3/MM3 (ref 1.7–7)
NEUTROPHILS NFR BLD AUTO: 81.6 % (ref 42.7–76)
NEUTROPHILS NFR BLD AUTO: 81.7 % (ref 42.7–76)
NEUTROPHILS NFR BLD AUTO: 83.1 % (ref 42.7–76)
NEUTROPHILS NFR BLD AUTO: 83.2 % (ref 42.7–76)
NEUTROPHILS NFR BLD AUTO: 83.7 % (ref 42.7–76)
NEUTROPHILS NFR BLD AUTO: 83.7 % (ref 42.7–76)
NEUTROPHILS NFR BLD AUTO: 88.9 % (ref 42.7–76)
NEUTROPHILS NFR BLD AUTO: 90.9 % (ref 42.7–76)
NEUTROPHILS NFR BLD AUTO: 91.8 % (ref 42.7–76)
NEUTROPHILS NFR BLD AUTO: 92 % (ref 42.7–76)
NEUTROPHILS NFR BLD AUTO: 92.3 % (ref 42.7–76)
NITRITE UR QL STRIP: NEGATIVE
NRBC BLD AUTO-RTO: 0 /100 WBC (ref 0–0.2)
NRBC BLD AUTO-RTO: 0.1 /100 WBC (ref 0–0.2)
NRBC BLD AUTO-RTO: 0.1 /100 WBC (ref 0–0.2)
NT-PROBNP SERPL-MCNC: ABNORMAL PG/ML (ref 0–1800)
NT-PROBNP SERPL-MCNC: ABNORMAL PG/ML (ref 0–1800)
PH UR STRIP.AUTO: 8.5 [PH] (ref 5–8)
PHOSPHATE SERPL-MCNC: 4.5 MG/DL (ref 2.5–4.5)
PHOSPHATE SERPL-MCNC: 4.6 MG/DL (ref 2.5–4.5)
PHOSPHATE SERPL-MCNC: 4.7 MG/DL (ref 2.5–4.5)
PHOSPHATE SERPL-MCNC: 5.3 MG/DL (ref 2.5–4.5)
PHOSPHATE SERPL-MCNC: 5.7 MG/DL (ref 2.5–4.5)
PHOSPHATE SERPL-MCNC: 5.8 MG/DL (ref 2.5–4.5)
PHOSPHATE SERPL-MCNC: 6.1 MG/DL (ref 2.5–4.5)
PHOSPHATE SERPL-MCNC: 6.4 MG/DL (ref 2.5–4.5)
PLATELET # BLD AUTO: 138 10*3/MM3 (ref 140–450)
PLATELET # BLD AUTO: 161 10*3/MM3 (ref 140–450)
PLATELET # BLD AUTO: 166 10*3/MM3 (ref 140–450)
PLATELET # BLD AUTO: 166 10*3/MM3 (ref 140–450)
PLATELET # BLD AUTO: 171 10*3/MM3 (ref 140–450)
PLATELET # BLD AUTO: 176 10*3/MM3 (ref 140–450)
PLATELET # BLD AUTO: 178 10*3/MM3 (ref 140–450)
PLATELET # BLD AUTO: 180 10*3/MM3 (ref 140–450)
PLATELET # BLD AUTO: 181 10*3/MM3 (ref 140–450)
PLATELET # BLD AUTO: 186 10*3/MM3 (ref 140–450)
PLATELET # BLD AUTO: 186 10*3/MM3 (ref 140–450)
PLATELET # BLD AUTO: 211 10*3/MM3 (ref 140–450)
PLATELET # BLD AUTO: 213 10*3/MM3 (ref 140–450)
PLATELET # BLD AUTO: 250 10*3/MM3 (ref 140–450)
PMV BLD AUTO: 10 FL (ref 6–12)
PMV BLD AUTO: 10 FL (ref 6–12)
PMV BLD AUTO: 10.1 FL (ref 6–12)
PMV BLD AUTO: 10.2 FL (ref 6–12)
PMV BLD AUTO: 10.3 FL (ref 6–12)
PMV BLD AUTO: 10.4 FL (ref 6–12)
PMV BLD AUTO: 9.9 FL (ref 6–12)
POTASSIUM SERPL-SCNC: 3.7 MMOL/L (ref 3.5–5.2)
POTASSIUM SERPL-SCNC: 4 MMOL/L (ref 3.5–5.2)
POTASSIUM SERPL-SCNC: 4.1 MMOL/L (ref 3.5–5.2)
POTASSIUM SERPL-SCNC: 4.3 MMOL/L (ref 3.5–5.2)
POTASSIUM SERPL-SCNC: 4.4 MMOL/L (ref 3.5–5.2)
POTASSIUM SERPL-SCNC: 4.6 MMOL/L (ref 3.5–5.2)
POTASSIUM SERPL-SCNC: 4.9 MMOL/L (ref 3.5–5.2)
POTASSIUM SERPL-SCNC: 5.1 MMOL/L (ref 3.5–5.2)
POTASSIUM SERPL-SCNC: 5.3 MMOL/L (ref 3.5–5.2)
POTASSIUM SERPL-SCNC: 5.4 MMOL/L (ref 3.5–5.2)
POTASSIUM SERPL-SCNC: 5.5 MMOL/L (ref 3.5–5.2)
POTASSIUM SERPL-SCNC: 5.6 MMOL/L (ref 3.5–5.2)
POTASSIUM SERPL-SCNC: 5.7 MMOL/L (ref 3.5–5.2)
POTASSIUM SERPL-SCNC: 5.9 MMOL/L (ref 3.5–5.2)
PROT SERPL-MCNC: 5.7 G/DL (ref 6–8.5)
PROT SERPL-MCNC: 6.1 G/DL (ref 6–8.5)
PROT SERPL-MCNC: 6.8 G/DL (ref 6–8.5)
PROT SERPL-MCNC: 6.8 G/DL (ref 6–8.5)
PROT UR QL STRIP: ABNORMAL
PROTHROMBIN TIME: 16.8 SECONDS (ref 11.7–14.2)
PROTHROMBIN TIME: 17.9 SECONDS (ref 11.7–14.2)
QT INTERVAL: 319 MS
QT INTERVAL: 343 MS
QT INTERVAL: 379 MS
RBC # BLD AUTO: 2.5 10*6/MM3 (ref 4.14–5.8)
RBC # BLD AUTO: 2.51 10*6/MM3 (ref 4.14–5.8)
RBC # BLD AUTO: 2.52 10*6/MM3 (ref 4.14–5.8)
RBC # BLD AUTO: 2.61 10*6/MM3 (ref 4.14–5.8)
RBC # BLD AUTO: 2.69 10*6/MM3 (ref 4.14–5.8)
RBC # BLD AUTO: 2.7 10*6/MM3 (ref 4.14–5.8)
RBC # BLD AUTO: 2.8 10*6/MM3 (ref 4.14–5.8)
RBC # BLD AUTO: 2.88 10*6/MM3 (ref 4.14–5.8)
RBC # BLD AUTO: 2.89 10*6/MM3 (ref 4.14–5.8)
RBC # BLD AUTO: 2.91 10*6/MM3 (ref 4.14–5.8)
RBC # BLD AUTO: 2.91 10*6/MM3 (ref 4.14–5.8)
RBC # BLD AUTO: 2.94 10*6/MM3 (ref 4.14–5.8)
RBC # BLD AUTO: 3.17 10*6/MM3 (ref 4.14–5.8)
RBC # BLD AUTO: 3.22 10*6/MM3 (ref 4.14–5.8)
RBC # UR STRIP: ABNORMAL /HPF
REF LAB TEST METHOD: ABNORMAL
RHINOVIRUS RNA SPEC NAA+PROBE: DETECTED
RHINOVIRUS RNA SPEC NAA+PROBE: NOT DETECTED
RSV RNA NPH QL NAA+NON-PROBE: NOT DETECTED
RSV RNA NPH QL NAA+NON-PROBE: NOT DETECTED
S PNEUM AG SPEC QL LA: NEGATIVE
SARS-COV-2 ORF1AB RESP QL NAA+PROBE: NOT DETECTED
SARS-COV-2 RNA NPH QL NAA+NON-PROBE: NOT DETECTED
SARS-COV-2 RNA NPH QL NAA+NON-PROBE: NOT DETECTED
SARS-COV-2 RNA RESP QL NAA+PROBE: NOT DETECTED
SINUS: 3.8 CM
SODIUM SERPL-SCNC: 131 MMOL/L (ref 136–145)
SODIUM SERPL-SCNC: 132 MMOL/L (ref 136–145)
SODIUM SERPL-SCNC: 136 MMOL/L (ref 136–145)
SODIUM SERPL-SCNC: 136 MMOL/L (ref 136–145)
SODIUM SERPL-SCNC: 137 MMOL/L (ref 136–145)
SODIUM SERPL-SCNC: 139 MMOL/L (ref 136–145)
SODIUM SERPL-SCNC: 140 MMOL/L (ref 136–145)
SODIUM SERPL-SCNC: 141 MMOL/L (ref 136–145)
SODIUM SERPL-SCNC: 141 MMOL/L (ref 136–145)
SODIUM SERPL-SCNC: 142 MMOL/L (ref 136–145)
SP GR UR STRIP: 1.01 (ref 1–1.03)
SQUAMOUS #/AREA URNS HPF: ABNORMAL /HPF
STRESS TARGET HR: 110 BPM
T4 FREE SERPL-MCNC: 1.06 NG/DL (ref 0.93–1.7)
TROPONIN T SERPL-MCNC: 0.18 NG/ML (ref 0–0.03)
TROPONIN T SERPL-MCNC: 0.21 NG/ML (ref 0–0.03)
TROPONIN T SERPL-MCNC: 0.26 NG/ML (ref 0–0.03)
TROPONIN T SERPL-MCNC: 0.28 NG/ML (ref 0–0.03)
TSH SERPL DL<=0.05 MIU/L-ACNC: 2.74 UIU/ML (ref 0.27–4.2)
UROBILINOGEN UR QL STRIP: ABNORMAL
WBC # UR STRIP: ABNORMAL /HPF
WBC NRBC COR # BLD: 10 10*3/MM3 (ref 3.4–10.8)
WBC NRBC COR # BLD: 10.14 10*3/MM3 (ref 3.4–10.8)
WBC NRBC COR # BLD: 10.27 10*3/MM3 (ref 3.4–10.8)
WBC NRBC COR # BLD: 13.03 10*3/MM3 (ref 3.4–10.8)
WBC NRBC COR # BLD: 14.4 10*3/MM3 (ref 3.4–10.8)
WBC NRBC COR # BLD: 15.55 10*3/MM3 (ref 3.4–10.8)
WBC NRBC COR # BLD: 16.65 10*3/MM3 (ref 3.4–10.8)
WBC NRBC COR # BLD: 19.45 10*3/MM3 (ref 3.4–10.8)
WBC NRBC COR # BLD: 7.16 10*3/MM3 (ref 3.4–10.8)
WBC NRBC COR # BLD: 8.57 10*3/MM3 (ref 3.4–10.8)
WBC NRBC COR # BLD: 8.6 10*3/MM3 (ref 3.4–10.8)
WBC NRBC COR # BLD: 9 10*3/MM3 (ref 3.4–10.8)
WBC NRBC COR # BLD: 9.06 10*3/MM3 (ref 3.4–10.8)
WBC NRBC COR # BLD: 9.99 10*3/MM3 (ref 3.4–10.8)
WHOLE BLOOD HOLD COAG: NORMAL
WHOLE BLOOD HOLD SPECIMEN: NORMAL

## 2022-01-01 PROCEDURE — 97530 THERAPEUTIC ACTIVITIES: CPT

## 2022-01-01 PROCEDURE — 97110 THERAPEUTIC EXERCISES: CPT

## 2022-01-01 PROCEDURE — 72125 CT NECK SPINE W/O DYE: CPT

## 2022-01-01 PROCEDURE — 85025 COMPLETE CBC W/AUTO DIFF WBC: CPT | Performed by: INTERNAL MEDICINE

## 2022-01-01 PROCEDURE — 93306 TTE W/DOPPLER COMPLETE: CPT

## 2022-01-01 PROCEDURE — 80069 RENAL FUNCTION PANEL: CPT | Performed by: INTERNAL MEDICINE

## 2022-01-01 PROCEDURE — 97162 PT EVAL MOD COMPLEX 30 MIN: CPT

## 2022-01-01 PROCEDURE — 93306 TTE W/DOPPLER COMPLETE: CPT | Performed by: INTERNAL MEDICINE

## 2022-01-01 PROCEDURE — 93010 ELECTROCARDIOGRAM REPORT: CPT | Performed by: INTERNAL MEDICINE

## 2022-01-01 PROCEDURE — 25010000002 HYDROMORPHONE PER 4 MG: Performed by: INTERNAL MEDICINE

## 2022-01-01 PROCEDURE — 99233 SBSQ HOSP IP/OBS HIGH 50: CPT | Performed by: NURSE PRACTITIONER

## 2022-01-01 PROCEDURE — 83605 ASSAY OF LACTIC ACID: CPT | Performed by: EMERGENCY MEDICINE

## 2022-01-01 PROCEDURE — 87899 AGENT NOS ASSAY W/OPTIC: CPT | Performed by: INTERNAL MEDICINE

## 2022-01-01 PROCEDURE — 25010000002 AZITHROMYCIN PER 500 MG: Performed by: EMERGENCY MEDICINE

## 2022-01-01 PROCEDURE — 85610 PROTHROMBIN TIME: CPT | Performed by: NURSE PRACTITIONER

## 2022-01-01 PROCEDURE — 83690 ASSAY OF LIPASE: CPT | Performed by: EMERGENCY MEDICINE

## 2022-01-01 PROCEDURE — 85025 COMPLETE CBC W/AUTO DIFF WBC: CPT | Performed by: HOSPITALIST

## 2022-01-01 PROCEDURE — 87040 BLOOD CULTURE FOR BACTERIA: CPT | Performed by: EMERGENCY MEDICINE

## 2022-01-01 PROCEDURE — 73110 X-RAY EXAM OF WRIST: CPT

## 2022-01-01 PROCEDURE — 84484 ASSAY OF TROPONIN QUANT: CPT | Performed by: INTERNAL MEDICINE

## 2022-01-01 PROCEDURE — 97116 GAIT TRAINING THERAPY: CPT

## 2022-01-01 PROCEDURE — 92610 EVALUATE SWALLOWING FUNCTION: CPT | Performed by: SPEECH-LANGUAGE PATHOLOGIST

## 2022-01-01 PROCEDURE — 99213 OFFICE O/P EST LOW 20 MIN: CPT | Performed by: NURSE PRACTITIONER

## 2022-01-01 PROCEDURE — 25010000002 AMPICILLIN-SULBACTAM PER 1.5 G: Performed by: INTERNAL MEDICINE

## 2022-01-01 PROCEDURE — 70450 CT HEAD/BRAIN W/O DYE: CPT

## 2022-01-01 PROCEDURE — 70486 CT MAXILLOFACIAL W/O DYE: CPT

## 2022-01-01 PROCEDURE — 80048 BASIC METABOLIC PNL TOTAL CA: CPT | Performed by: INTERNAL MEDICINE

## 2022-01-01 PROCEDURE — 84484 ASSAY OF TROPONIN QUANT: CPT | Performed by: EMERGENCY MEDICINE

## 2022-01-01 PROCEDURE — 84100 ASSAY OF PHOSPHORUS: CPT | Performed by: INTERNAL MEDICINE

## 2022-01-01 PROCEDURE — 99222 1ST HOSP IP/OBS MODERATE 55: CPT | Performed by: INTERNAL MEDICINE

## 2022-01-01 PROCEDURE — 92526 ORAL FUNCTION THERAPY: CPT | Performed by: SPEECH-LANGUAGE PATHOLOGIST

## 2022-01-01 PROCEDURE — 25010000002 PIPERACILLIN SOD-TAZOBACTAM PER 1 G: Performed by: INTERNAL MEDICINE

## 2022-01-01 PROCEDURE — 99284 EMERGENCY DEPT VISIT MOD MDM: CPT

## 2022-01-01 PROCEDURE — 99232 SBSQ HOSP IP/OBS MODERATE 35: CPT | Performed by: INTERNAL MEDICINE

## 2022-01-01 PROCEDURE — 5A1D70Z PERFORMANCE OF URINARY FILTRATION, INTERMITTENT, LESS THAN 6 HOURS PER DAY: ICD-10-PCS | Performed by: INTERNAL MEDICINE

## 2022-01-01 PROCEDURE — 93005 ELECTROCARDIOGRAM TRACING: CPT

## 2022-01-01 PROCEDURE — U0003 INFECTIOUS AGENT DETECTION BY NUCLEIC ACID (DNA OR RNA); SEVERE ACUTE RESPIRATORY SYNDROME CORONAVIRUS 2 (SARS-COV-2) (CORONAVIRUS DISEASE [COVID-19]), AMPLIFIED PROBE TECHNIQUE, MAKING USE OF HIGH THROUGHPUT TECHNOLOGIES AS DESCRIBED BY CMS-2020-01-R: HCPCS | Performed by: INTERNAL MEDICINE

## 2022-01-01 PROCEDURE — 85610 PROTHROMBIN TIME: CPT | Performed by: EMERGENCY MEDICINE

## 2022-01-01 PROCEDURE — 72110 X-RAY EXAM L-2 SPINE 4/>VWS: CPT

## 2022-01-01 PROCEDURE — U0005 INFEC AGEN DETEC AMPLI PROBE: HCPCS | Performed by: INTERNAL MEDICINE

## 2022-01-01 PROCEDURE — 96374 THER/PROPH/DIAG INJ IV PUSH: CPT

## 2022-01-01 PROCEDURE — 25010000002 HEPARIN (PORCINE) PER 1000 UNITS: Performed by: INTERNAL MEDICINE

## 2022-01-01 PROCEDURE — 97166 OT EVAL MOD COMPLEX 45 MIN: CPT | Performed by: OCCUPATIONAL THERAPIST

## 2022-01-01 PROCEDURE — 25010000002 HYDROMORPHONE PER 4 MG: Performed by: HOSPITALIST

## 2022-01-01 PROCEDURE — 83880 ASSAY OF NATRIURETIC PEPTIDE: CPT | Performed by: NURSE PRACTITIONER

## 2022-01-01 PROCEDURE — G0152 HHCP-SERV OF OT,EA 15 MIN: HCPCS

## 2022-01-01 PROCEDURE — G0299 HHS/HOSPICE OF RN EA 15 MIN: HCPCS

## 2022-01-01 PROCEDURE — 73502 X-RAY EXAM HIP UNI 2-3 VIEWS: CPT

## 2022-01-01 PROCEDURE — U0005 INFEC AGEN DETEC AMPLI PROBE: HCPCS | Performed by: EMERGENCY MEDICINE

## 2022-01-01 PROCEDURE — 25010000002 HYDROMORPHONE 1 MG/ML SOLUTION: Performed by: HOSPITALIST

## 2022-01-01 PROCEDURE — 93005 ELECTROCARDIOGRAM TRACING: CPT | Performed by: HOSPITALIST

## 2022-01-01 PROCEDURE — 82962 GLUCOSE BLOOD TEST: CPT

## 2022-01-01 PROCEDURE — 99283 EMERGENCY DEPT VISIT LOW MDM: CPT

## 2022-01-01 PROCEDURE — 84439 ASSAY OF FREE THYROXINE: CPT | Performed by: NURSE PRACTITIONER

## 2022-01-01 PROCEDURE — 72170 X-RAY EXAM OF PELVIS: CPT

## 2022-01-01 PROCEDURE — 71045 X-RAY EXAM CHEST 1 VIEW: CPT

## 2022-01-01 PROCEDURE — 92526 ORAL FUNCTION THERAPY: CPT

## 2022-01-01 PROCEDURE — 80053 COMPREHEN METABOLIC PANEL: CPT | Performed by: EMERGENCY MEDICINE

## 2022-01-01 PROCEDURE — 25010000002 CEFTRIAXONE PER 250 MG: Performed by: EMERGENCY MEDICINE

## 2022-01-01 PROCEDURE — 87641 MR-STAPH DNA AMP PROBE: CPT | Performed by: INTERNAL MEDICINE

## 2022-01-01 PROCEDURE — 71250 CT THORAX DX C-: CPT

## 2022-01-01 PROCEDURE — 93005 ELECTROCARDIOGRAM TRACING: CPT | Performed by: EMERGENCY MEDICINE

## 2022-01-01 PROCEDURE — 71046 X-RAY EXAM CHEST 2 VIEWS: CPT

## 2022-01-01 PROCEDURE — 93005 ELECTROCARDIOGRAM TRACING: CPT | Performed by: NURSE PRACTITIONER

## 2022-01-01 PROCEDURE — 36415 COLL VENOUS BLD VENIPUNCTURE: CPT | Performed by: EMERGENCY MEDICINE

## 2022-01-01 PROCEDURE — 85025 COMPLETE CBC W/AUTO DIFF WBC: CPT | Performed by: NURSE PRACTITIONER

## 2022-01-01 PROCEDURE — 84443 ASSAY THYROID STIM HORMONE: CPT | Performed by: NURSE PRACTITIONER

## 2022-01-01 PROCEDURE — 85025 COMPLETE CBC W/AUTO DIFF WBC: CPT | Performed by: EMERGENCY MEDICINE

## 2022-01-01 PROCEDURE — 83880 ASSAY OF NATRIURETIC PEPTIDE: CPT | Performed by: EMERGENCY MEDICINE

## 2022-01-01 PROCEDURE — G0151 HHCP-SERV OF PT,EA 15 MIN: HCPCS

## 2022-01-01 PROCEDURE — 80053 COMPREHEN METABOLIC PANEL: CPT | Performed by: INTERNAL MEDICINE

## 2022-01-01 PROCEDURE — 99214 OFFICE O/P EST MOD 30 MIN: CPT | Performed by: NURSE PRACTITIONER

## 2022-01-01 PROCEDURE — 0202U NFCT DS 22 TRGT SARS-COV-2: CPT | Performed by: NURSE PRACTITIONER

## 2022-01-01 PROCEDURE — 25010000002 HEPARIN (PORCINE) PER 1000 UNITS: Performed by: HOSPITALIST

## 2022-01-01 PROCEDURE — 0202U NFCT DS 22 TRGT SARS-COV-2: CPT | Performed by: EMERGENCY MEDICINE

## 2022-01-01 PROCEDURE — 25010000002 VANCOMYCIN 10 G RECONSTITUTED SOLUTION: Performed by: INTERNAL MEDICINE

## 2022-01-01 PROCEDURE — G0157 HHC PT ASSISTANT EA 15: HCPCS

## 2022-01-01 PROCEDURE — 83735 ASSAY OF MAGNESIUM: CPT | Performed by: NURSE PRACTITIONER

## 2022-01-01 PROCEDURE — 99223 1ST HOSP IP/OBS HIGH 75: CPT | Performed by: PSYCHIATRY & NEUROLOGY

## 2022-01-01 PROCEDURE — 99222 1ST HOSP IP/OBS MODERATE 55: CPT | Performed by: NURSE PRACTITIONER

## 2022-01-01 PROCEDURE — 81001 URINALYSIS AUTO W/SCOPE: CPT | Performed by: EMERGENCY MEDICINE

## 2022-01-01 PROCEDURE — 85027 COMPLETE CBC AUTOMATED: CPT | Performed by: INTERNAL MEDICINE

## 2022-01-01 PROCEDURE — 74176 CT ABD & PELVIS W/O CONTRAST: CPT

## 2022-01-01 PROCEDURE — 80053 COMPREHEN METABOLIC PANEL: CPT | Performed by: NURSE PRACTITIONER

## 2022-01-01 PROCEDURE — 83735 ASSAY OF MAGNESIUM: CPT | Performed by: HOSPITALIST

## 2022-01-01 PROCEDURE — 87449 NOS EACH ORGANISM AG IA: CPT | Performed by: INTERNAL MEDICINE

## 2022-01-01 PROCEDURE — U0004 COV-19 TEST NON-CDC HGH THRU: HCPCS | Performed by: EMERGENCY MEDICINE

## 2022-01-01 PROCEDURE — 99285 EMERGENCY DEPT VISIT HI MDM: CPT

## 2022-01-01 PROCEDURE — 85730 THROMBOPLASTIN TIME PARTIAL: CPT | Performed by: EMERGENCY MEDICINE

## 2022-01-01 PROCEDURE — 84484 ASSAY OF TROPONIN QUANT: CPT | Performed by: NURSE PRACTITIONER

## 2022-01-01 RX ORDER — KETOROLAC TROMETHAMINE 15 MG/ML
15 INJECTION, SOLUTION INTRAMUSCULAR; INTRAVENOUS EVERY 6 HOURS PRN
Status: DISCONTINUED | OUTPATIENT
Start: 2022-01-01 | End: 2022-01-01

## 2022-01-01 RX ORDER — SODIUM CHLORIDE 0.9 % (FLUSH) 0.9 %
10 SYRINGE (ML) INJECTION AS NEEDED
Status: DISCONTINUED | OUTPATIENT
Start: 2022-01-01 | End: 2022-01-01 | Stop reason: HOSPADM

## 2022-01-01 RX ORDER — MORPHINE SULFATE 20 MG/ML
20 SOLUTION ORAL
Status: DISCONTINUED | OUTPATIENT
Start: 2022-01-01 | End: 2022-01-01 | Stop reason: HOSPADM

## 2022-01-01 RX ORDER — ALBUMIN (HUMAN) 12.5 G/50ML
12.5 SOLUTION INTRAVENOUS AS NEEDED
Status: ACTIVE | OUTPATIENT
Start: 2022-01-01 | End: 2022-01-01

## 2022-01-01 RX ORDER — GUAIFENESIN 200 MG/10ML
200 LIQUID ORAL NIGHTLY
Status: DISCONTINUED | OUTPATIENT
Start: 2022-01-01 | End: 2022-01-01 | Stop reason: HOSPADM

## 2022-01-01 RX ORDER — BENZONATATE 200 MG/1
CAPSULE ORAL
Qty: 30 CAPSULE | Refills: 0 | Status: SHIPPED | OUTPATIENT
Start: 2022-01-01 | End: 2022-01-01 | Stop reason: HOSPADM

## 2022-01-01 RX ORDER — LIDOCAINE AND PRILOCAINE 25; 25 MG/G; MG/G
CREAM TOPICAL AS NEEDED
Status: DISCONTINUED | OUTPATIENT
Start: 2022-01-01 | End: 2022-01-01 | Stop reason: HOSPADM

## 2022-01-01 RX ORDER — ACETAMINOPHEN 160 MG/5ML
650 SOLUTION ORAL EVERY 4 HOURS PRN
Status: DISCONTINUED | OUTPATIENT
Start: 2022-01-01 | End: 2022-01-01

## 2022-01-01 RX ORDER — FAMOTIDINE 40 MG/1
40 TABLET, FILM COATED ORAL DAILY
Qty: 90 TABLET | Refills: 3 | Status: SHIPPED | OUTPATIENT
Start: 2022-01-01

## 2022-01-01 RX ORDER — PREDNISONE 20 MG/1
20 TABLET ORAL DAILY
Qty: 10 TABLET | Refills: 0 | Status: SHIPPED | OUTPATIENT
Start: 2022-01-01 | End: 2022-01-01

## 2022-01-01 RX ORDER — BENZONATATE 100 MG/1
200 CAPSULE ORAL NIGHTLY
Status: DISCONTINUED | OUTPATIENT
Start: 2022-01-01 | End: 2022-01-01 | Stop reason: HOSPADM

## 2022-01-01 RX ORDER — GUAIFENESIN 200 MG/10ML
100 LIQUID ORAL NIGHTLY PRN
Status: DISCONTINUED | OUTPATIENT
Start: 2022-01-01 | End: 2022-01-01

## 2022-01-01 RX ORDER — POLYETHYLENE GLYCOL 3350 17 G/17G
17 POWDER, FOR SOLUTION ORAL DAILY
Status: DISCONTINUED | OUTPATIENT
Start: 2022-01-01 | End: 2022-01-01 | Stop reason: HOSPADM

## 2022-01-01 RX ORDER — QUETIAPINE FUMARATE 25 MG/1
25 TABLET, FILM COATED ORAL NIGHTLY
Status: DISCONTINUED | OUTPATIENT
Start: 2022-01-01 | End: 2022-01-01

## 2022-01-01 RX ORDER — BENZONATATE 200 MG/1
CAPSULE ORAL
Qty: 30 CAPSULE | Refills: 0 | Status: SHIPPED | OUTPATIENT
Start: 2022-01-01 | End: 2022-01-01 | Stop reason: SDUPTHER

## 2022-01-01 RX ORDER — HEPARIN SODIUM 5000 [USP'U]/ML
5000 INJECTION, SOLUTION INTRAVENOUS; SUBCUTANEOUS EVERY 12 HOURS SCHEDULED
Status: DISCONTINUED | OUTPATIENT
Start: 2022-01-01 | End: 2022-01-01

## 2022-01-01 RX ORDER — AZELASTINE HYDROCHLORIDE, FLUTICASONE PROPIONATE 137; 50 UG/1; UG/1
SPRAY, METERED NASAL
Qty: 23 G | Refills: 5 | Status: SHIPPED | OUTPATIENT
Start: 2022-01-01

## 2022-01-01 RX ORDER — SEVELAMER CARBONATE 800 MG/1
800 TABLET, FILM COATED ORAL 3 TIMES DAILY
Status: DISCONTINUED | OUTPATIENT
Start: 2022-01-01 | End: 2022-01-01

## 2022-01-01 RX ORDER — MAGNESIUM 200 MG
TABLET ORAL DAILY
COMMUNITY

## 2022-01-01 RX ORDER — ONDANSETRON 2 MG/ML
4 INJECTION INTRAMUSCULAR; INTRAVENOUS EVERY 6 HOURS PRN
Status: DISCONTINUED | OUTPATIENT
Start: 2022-01-01 | End: 2022-01-01 | Stop reason: HOSPADM

## 2022-01-01 RX ORDER — BENZONATATE 100 MG/1
100 CAPSULE ORAL 3 TIMES DAILY PRN
Start: 2022-01-01

## 2022-01-01 RX ORDER — DONEPEZIL HYDROCHLORIDE 10 MG/1
10 TABLET, FILM COATED ORAL NIGHTLY
Status: DISCONTINUED | OUTPATIENT
Start: 2022-01-01 | End: 2022-01-01

## 2022-01-01 RX ORDER — DILTIAZEM HYDROCHLORIDE 180 MG/1
180 CAPSULE, COATED, EXTENDED RELEASE ORAL
Refills: 2 | Status: DISCONTINUED | OUTPATIENT
Start: 2022-01-01 | End: 2022-01-01

## 2022-01-01 RX ORDER — LIDOCAINE AND PRILOCAINE 25; 25 MG/G; MG/G
1 CREAM TOPICAL 3 TIMES WEEKLY
Status: DISCONTINUED | OUTPATIENT
Start: 2022-01-01 | End: 2022-01-01 | Stop reason: HOSPADM

## 2022-01-01 RX ORDER — DIPHENOXYLATE HYDROCHLORIDE AND ATROPINE SULFATE 2.5; .025 MG/1; MG/1
1 TABLET ORAL
Status: DISCONTINUED | OUTPATIENT
Start: 2022-01-01 | End: 2022-01-01

## 2022-01-01 RX ORDER — ACETAMINOPHEN 325 MG/1
650 TABLET ORAL EVERY 4 HOURS PRN
Start: 2022-01-01

## 2022-01-01 RX ORDER — LORAZEPAM 2 MG/ML
0.5 INJECTION INTRAMUSCULAR
Status: DISCONTINUED | OUTPATIENT
Start: 2022-01-01 | End: 2022-01-01

## 2022-01-01 RX ORDER — HYDROMORPHONE HYDROCHLORIDE 1 MG/ML
0.5 INJECTION, SOLUTION INTRAMUSCULAR; INTRAVENOUS; SUBCUTANEOUS
Status: DISCONTINUED | OUTPATIENT
Start: 2022-01-01 | End: 2022-01-01 | Stop reason: HOSPADM

## 2022-01-01 RX ORDER — DIPHENOXYLATE HYDROCHLORIDE AND ATROPINE SULFATE 2.5; .025 MG/1; MG/1
TABLET ORAL DAILY
COMMUNITY

## 2022-01-01 RX ORDER — HYDROCODONE BITARTRATE AND HOMATROPINE METHYLBROMIDE ORAL SOLUTION 5; 1.5 MG/5ML; MG/5ML
5 LIQUID ORAL EVERY 6 HOURS PRN
Qty: 30 ML | Refills: 0 | Status: SHIPPED | OUTPATIENT
Start: 2022-01-01 | End: 2022-01-01

## 2022-01-01 RX ORDER — SEVELAMER CARBONATE 800 MG/1
2400 TABLET, FILM COATED ORAL
Status: DISCONTINUED | OUTPATIENT
Start: 2022-01-01 | End: 2022-01-01 | Stop reason: HOSPADM

## 2022-01-01 RX ORDER — LORAZEPAM 2 MG/ML
1 INJECTION INTRAMUSCULAR
Status: DISCONTINUED | OUTPATIENT
Start: 2022-01-01 | End: 2022-01-01

## 2022-01-01 RX ORDER — PREDNISONE 10 MG/1
TABLET ORAL
Qty: 30 TABLET | Refills: 0 | Status: ON HOLD | OUTPATIENT
Start: 2022-01-01 | End: 2022-01-01

## 2022-01-01 RX ORDER — ACETAMINOPHEN 650 MG/1
650 SUPPOSITORY RECTAL EVERY 4 HOURS PRN
Status: DISCONTINUED | OUTPATIENT
Start: 2022-01-01 | End: 2022-01-01 | Stop reason: HOSPADM

## 2022-01-01 RX ORDER — OLANZAPINE 10 MG/1
5 INJECTION, POWDER, LYOPHILIZED, FOR SOLUTION INTRAMUSCULAR ONCE AS NEEDED
Status: COMPLETED | OUTPATIENT
Start: 2022-01-01 | End: 2022-01-01

## 2022-01-01 RX ORDER — SODIUM CHLORIDE 0.9 % (FLUSH) 0.9 %
10 SYRINGE (ML) INJECTION EVERY 12 HOURS SCHEDULED
Status: DISCONTINUED | OUTPATIENT
Start: 2022-01-01 | End: 2022-01-01

## 2022-01-01 RX ORDER — ACETAMINOPHEN 325 MG/1
650 TABLET ORAL EVERY 4 HOURS PRN
Status: DISCONTINUED | OUTPATIENT
Start: 2022-01-01 | End: 2022-01-01

## 2022-01-01 RX ORDER — DILTIAZEM HYDROCHLORIDE 5 MG/ML
10 INJECTION INTRAVENOUS ONCE
Status: COMPLETED | OUTPATIENT
Start: 2022-01-01 | End: 2022-01-01

## 2022-01-01 RX ORDER — FAMOTIDINE 20 MG/1
40 TABLET, FILM COATED ORAL DAILY
Status: DISCONTINUED | OUTPATIENT
Start: 2022-01-01 | End: 2022-01-01

## 2022-01-01 RX ORDER — GLYCOPYRROLATE 0.2 MG/ML
0.2 INJECTION INTRAMUSCULAR; INTRAVENOUS
Status: DISCONTINUED | OUTPATIENT
Start: 2022-01-01 | End: 2022-01-01 | Stop reason: HOSPADM

## 2022-01-01 RX ORDER — LIDOCAINE AND PRILOCAINE 25; 25 MG/G; MG/G
1 CREAM TOPICAL ONCE
Status: COMPLETED | OUTPATIENT
Start: 2022-01-01 | End: 2022-01-01

## 2022-01-01 RX ORDER — FINASTERIDE 5 MG/1
5 TABLET, FILM COATED ORAL DAILY
Status: DISCONTINUED | OUTPATIENT
Start: 2022-01-01 | End: 2022-01-01

## 2022-01-01 RX ORDER — HYDROCODONE BITARTRATE AND HOMATROPINE METHYLBROMIDE ORAL SOLUTION 5; 1.5 MG/5ML; MG/5ML
5 LIQUID ORAL EVERY 6 HOURS PRN
Status: DISCONTINUED | OUTPATIENT
Start: 2022-01-01 | End: 2022-01-01 | Stop reason: HOSPADM

## 2022-01-01 RX ORDER — CARVEDILOL 6.25 MG/1
6.25 TABLET ORAL 2 TIMES DAILY WITH MEALS
Start: 2022-01-01

## 2022-01-01 RX ORDER — MORPHINE SULFATE 10 MG/ML
6 INJECTION INTRAMUSCULAR; INTRAVENOUS; SUBCUTANEOUS
Status: DISCONTINUED | OUTPATIENT
Start: 2022-01-01 | End: 2022-01-01

## 2022-01-01 RX ORDER — ACETAMINOPHEN 650 MG/1
650 SUPPOSITORY RECTAL EVERY 4 HOURS PRN
Status: DISCONTINUED | OUTPATIENT
Start: 2022-01-01 | End: 2022-01-01

## 2022-01-01 RX ORDER — QUETIAPINE FUMARATE 25 MG/1
TABLET, FILM COATED ORAL
Qty: 90 TABLET | Refills: 1 | Status: SHIPPED | OUTPATIENT
Start: 2022-01-01

## 2022-01-01 RX ORDER — DILTIAZEM HYDROCHLORIDE 180 MG/1
CAPSULE, EXTENDED RELEASE ORAL
Qty: 90 CAPSULE | Refills: 2 | Status: SHIPPED | OUTPATIENT
Start: 2022-01-01 | End: 2022-01-01 | Stop reason: HOSPADM

## 2022-01-01 RX ORDER — FINASTERIDE 5 MG/1
5 TABLET, FILM COATED ORAL DAILY
Status: DISCONTINUED | OUTPATIENT
Start: 2022-01-01 | End: 2022-01-01 | Stop reason: HOSPADM

## 2022-01-01 RX ORDER — MORPHINE SULFATE 2 MG/ML
2 INJECTION, SOLUTION INTRAMUSCULAR; INTRAVENOUS
Status: DISCONTINUED | OUTPATIENT
Start: 2022-01-01 | End: 2022-01-01

## 2022-01-01 RX ORDER — SCOLOPAMINE TRANSDERMAL SYSTEM 1 MG/1
1 PATCH, EXTENDED RELEASE TRANSDERMAL
Status: DISCONTINUED | OUTPATIENT
Start: 2022-01-01 | End: 2022-01-01 | Stop reason: HOSPADM

## 2022-01-01 RX ORDER — CEFUROXIME AXETIL 500 MG/1
500 TABLET ORAL 2 TIMES DAILY
Qty: 20 TABLET | Refills: 0 | Status: SHIPPED | OUTPATIENT
Start: 2022-01-01 | End: 2022-01-01

## 2022-01-01 RX ORDER — LORAZEPAM 2 MG/ML
1 INJECTION INTRAMUSCULAR
Status: DISCONTINUED | OUTPATIENT
Start: 2022-01-01 | End: 2022-01-01 | Stop reason: HOSPADM

## 2022-01-01 RX ORDER — MORPHINE SULFATE 20 MG/ML
10 SOLUTION ORAL
Status: DISCONTINUED | OUTPATIENT
Start: 2022-01-01 | End: 2022-01-01 | Stop reason: HOSPADM

## 2022-01-01 RX ORDER — LORAZEPAM 2 MG/ML
0.5 CONCENTRATE ORAL
Status: DISCONTINUED | OUTPATIENT
Start: 2022-01-01 | End: 2022-01-01 | Stop reason: HOSPADM

## 2022-01-01 RX ORDER — CARVEDILOL 6.25 MG/1
6.25 TABLET ORAL 2 TIMES DAILY WITH MEALS
Status: DISCONTINUED | OUTPATIENT
Start: 2022-01-01 | End: 2022-01-01

## 2022-01-01 RX ORDER — OLANZAPINE 2.5 MG/1
2.5 TABLET ORAL 2 TIMES DAILY PRN
Status: DISCONTINUED | OUTPATIENT
Start: 2022-01-01 | End: 2022-01-01

## 2022-01-01 RX ORDER — NITROGLYCERIN 0.4 MG/1
0.4 TABLET SUBLINGUAL
Status: DISCONTINUED | OUTPATIENT
Start: 2022-01-01 | End: 2022-01-01

## 2022-01-01 RX ORDER — ACETAMINOPHEN 325 MG/1
650 TABLET ORAL EVERY 4 HOURS PRN
Status: DISCONTINUED | OUTPATIENT
Start: 2022-01-01 | End: 2022-01-01 | Stop reason: HOSPADM

## 2022-01-01 RX ORDER — DIPHENOXYLATE HYDROCHLORIDE AND ATROPINE SULFATE 2.5; .025 MG/1; MG/1
1 TABLET ORAL DAILY
Status: DISCONTINUED | OUTPATIENT
Start: 2022-01-01 | End: 2022-01-01

## 2022-01-01 RX ORDER — GUAIFENESIN 600 MG/1
1200 TABLET, EXTENDED RELEASE ORAL 2 TIMES DAILY
COMMUNITY

## 2022-01-01 RX ORDER — POLYETHYLENE GLYCOL 3350 17 G/17G
17 POWDER, FOR SOLUTION ORAL
Status: DISCONTINUED | OUTPATIENT
Start: 2022-01-01 | End: 2022-01-01

## 2022-01-01 RX ORDER — QUETIAPINE FUMARATE 25 MG/1
25 TABLET, FILM COATED ORAL NIGHTLY
Status: DISCONTINUED | OUTPATIENT
Start: 2022-01-01 | End: 2022-01-01 | Stop reason: HOSPADM

## 2022-01-01 RX ORDER — LORAZEPAM 2 MG/ML
1 CONCENTRATE ORAL
Status: DISCONTINUED | OUTPATIENT
Start: 2022-01-01 | End: 2022-01-01 | Stop reason: HOSPADM

## 2022-01-01 RX ORDER — CHOLECALCIFEROL (VITAMIN D3) 125 MCG
1000 CAPSULE ORAL DAILY
Status: DISCONTINUED | OUTPATIENT
Start: 2022-01-01 | End: 2022-01-01

## 2022-01-01 RX ORDER — DONEPEZIL HYDROCHLORIDE 10 MG/1
10 TABLET, FILM COATED ORAL NIGHTLY
Status: DISCONTINUED | OUTPATIENT
Start: 2022-01-01 | End: 2022-01-01 | Stop reason: HOSPADM

## 2022-01-01 RX ORDER — FAMOTIDINE 20 MG/1
40 TABLET, FILM COATED ORAL DAILY
Status: DISCONTINUED | OUTPATIENT
Start: 2022-01-01 | End: 2022-01-01 | Stop reason: HOSPADM

## 2022-01-01 RX ORDER — OLANZAPINE 7.5 MG/1
7.5 TABLET ORAL ONCE
Status: COMPLETED | OUTPATIENT
Start: 2022-01-01 | End: 2022-01-01

## 2022-01-01 RX ORDER — HYDROCODONE BITARTRATE AND HOMATROPINE METHYLBROMIDE ORAL SOLUTION 5; 1.5 MG/5ML; MG/5ML
5 LIQUID ORAL NIGHTLY
Status: DISCONTINUED | OUTPATIENT
Start: 2022-01-01 | End: 2022-01-01 | Stop reason: HOSPADM

## 2022-01-01 RX ORDER — MORPHINE SULFATE 2 MG/ML
4 INJECTION, SOLUTION INTRAMUSCULAR; INTRAVENOUS
Status: DISCONTINUED | OUTPATIENT
Start: 2022-01-01 | End: 2022-01-01

## 2022-01-01 RX ORDER — LIDOCAINE AND PRILOCAINE 25; 25 MG/G; MG/G
1 CREAM TOPICAL ONCE
Status: CANCELLED | OUTPATIENT
Start: 2022-01-01

## 2022-01-01 RX ORDER — GLYCOPYRROLATE 0.2 MG/ML
0.4 INJECTION INTRAMUSCULAR; INTRAVENOUS
Status: DISCONTINUED | OUTPATIENT
Start: 2022-01-01 | End: 2022-01-01 | Stop reason: HOSPADM

## 2022-01-01 RX ORDER — HEPARIN SODIUM 5000 [USP'U]/ML
5000 INJECTION, SOLUTION INTRAVENOUS; SUBCUTANEOUS EVERY 12 HOURS SCHEDULED
Status: COMPLETED | OUTPATIENT
Start: 2022-01-01 | End: 2022-01-01

## 2022-01-01 RX ORDER — GUAIFENESIN 200 MG/10ML
200 LIQUID ORAL NIGHTLY
Start: 2022-01-01

## 2022-01-01 RX ORDER — LORAZEPAM 2 MG/ML
0.5 INJECTION INTRAMUSCULAR
Status: DISCONTINUED | OUTPATIENT
Start: 2022-01-01 | End: 2022-01-01 | Stop reason: HOSPADM

## 2022-01-01 RX ORDER — CARVEDILOL 6.25 MG/1
6.25 TABLET ORAL 2 TIMES DAILY WITH MEALS
Status: DISCONTINUED | OUTPATIENT
Start: 2022-01-01 | End: 2022-01-01 | Stop reason: HOSPADM

## 2022-01-01 RX ORDER — LIDOCAINE AND PRILOCAINE 25; 25 MG/G; MG/G
1 CREAM TOPICAL ONCE
Status: DISCONTINUED | OUTPATIENT
Start: 2022-01-01 | End: 2022-01-01

## 2022-01-01 RX ORDER — BENZONATATE 100 MG/1
100 CAPSULE ORAL 3 TIMES DAILY PRN
Status: DISCONTINUED | OUTPATIENT
Start: 2022-01-01 | End: 2022-01-01 | Stop reason: HOSPADM

## 2022-01-01 RX ORDER — MORPHINE SULFATE 20 MG/ML
5 SOLUTION ORAL
Status: DISCONTINUED | OUTPATIENT
Start: 2022-01-01 | End: 2022-01-01 | Stop reason: HOSPADM

## 2022-01-01 RX ORDER — NITROGLYCERIN 0.4 MG/1
0.4 TABLET SUBLINGUAL
Status: DISCONTINUED | OUTPATIENT
Start: 2022-01-01 | End: 2022-01-01 | Stop reason: HOSPADM

## 2022-01-01 RX ORDER — ACETAMINOPHEN 160 MG/5ML
650 SOLUTION ORAL EVERY 4 HOURS PRN
Status: DISCONTINUED | OUTPATIENT
Start: 2022-01-01 | End: 2022-01-01 | Stop reason: HOSPADM

## 2022-01-01 RX ORDER — HYDROMORPHONE HCL 110MG/55ML
1.5 PATIENT CONTROLLED ANALGESIA SYRINGE INTRAVENOUS
Status: DISCONTINUED | OUTPATIENT
Start: 2022-01-01 | End: 2022-01-01 | Stop reason: HOSPADM

## 2022-01-01 RX ADMIN — ACETAMINOPHEN 650 MG: 325 TABLET ORAL at 21:17

## 2022-01-01 RX ADMIN — HYDROCODONE BITARTRATE AND HOMATROPINE METHYLBROMIDE 5 ML: 5; 1.5 SOLUTION ORAL at 20:20

## 2022-01-01 RX ADMIN — HYDROMORPHONE HYDROCHLORIDE 0.5 MG: 1 INJECTION, SOLUTION INTRAMUSCULAR; INTRAVENOUS; SUBCUTANEOUS at 00:24

## 2022-01-01 RX ADMIN — CARVEDILOL 6.25 MG: 6.25 TABLET, FILM COATED ORAL at 18:34

## 2022-01-01 RX ADMIN — TAZOBACTAM SODIUM AND PIPERACILLIN SODIUM 3.38 G: 375; 3 INJECTION, SOLUTION INTRAVENOUS at 01:30

## 2022-01-01 RX ADMIN — GUAIFENESIN 200 MG: 200 SOLUTION ORAL at 21:42

## 2022-01-01 RX ADMIN — QUETIAPINE FUMARATE 25 MG: 25 TABLET ORAL at 21:42

## 2022-01-01 RX ADMIN — CARVEDILOL 6.25 MG: 6.25 TABLET, FILM COATED ORAL at 17:36

## 2022-01-01 RX ADMIN — DONEPEZIL HYDROCHLORIDE 10 MG: 10 TABLET, FILM COATED ORAL at 22:30

## 2022-01-01 RX ADMIN — Medication 10 ML: at 20:35

## 2022-01-01 RX ADMIN — CARVEDILOL 6.25 MG: 6.25 TABLET, FILM COATED ORAL at 17:18

## 2022-01-01 RX ADMIN — Medication 10 ML: at 21:42

## 2022-01-01 RX ADMIN — HYDROMORPHONE HYDROCHLORIDE 0.5 MG: 1 INJECTION, SOLUTION INTRAMUSCULAR; INTRAVENOUS; SUBCUTANEOUS at 12:52

## 2022-01-01 RX ADMIN — Medication 10 ML: at 09:48

## 2022-01-01 RX ADMIN — LORAZEPAM 1 MG: 2 CONCENTRATE ORAL at 08:41

## 2022-01-01 RX ADMIN — FAMOTIDINE 40 MG: 20 TABLET ORAL at 08:41

## 2022-01-01 RX ADMIN — CARVEDILOL 6.25 MG: 6.25 TABLET, FILM COATED ORAL at 08:41

## 2022-01-01 RX ADMIN — Medication 10 ML: at 20:13

## 2022-01-01 RX ADMIN — DONEPEZIL HYDROCHLORIDE 10 MG: 10 TABLET, FILM COATED ORAL at 21:42

## 2022-01-01 RX ADMIN — HYDROMORPHONE HYDROCHLORIDE 1 MG: 1 INJECTION, SOLUTION INTRAMUSCULAR; INTRAVENOUS; SUBCUTANEOUS at 20:33

## 2022-01-01 RX ADMIN — SEVELAMER CARBONATE 2400 MG: 800 TABLET, FILM COATED ORAL at 08:38

## 2022-01-01 RX ADMIN — TAZOBACTAM SODIUM AND PIPERACILLIN SODIUM 3.38 G: 375; 3 INJECTION, SOLUTION INTRAVENOUS at 00:15

## 2022-01-01 RX ADMIN — TAZOBACTAM SODIUM AND PIPERACILLIN SODIUM 3.38 G: 375; 3 INJECTION, SOLUTION INTRAVENOUS at 14:04

## 2022-01-01 RX ADMIN — Medication 10 ML: at 20:42

## 2022-01-01 RX ADMIN — FAMOTIDINE 40 MG: 20 TABLET ORAL at 22:32

## 2022-01-01 RX ADMIN — TAZOBACTAM SODIUM AND PIPERACILLIN SODIUM 3.38 G: 375; 3 INJECTION, SOLUTION INTRAVENOUS at 12:30

## 2022-01-01 RX ADMIN — GLYCOPYRROLATE 0.4 MG: 0.2 INJECTION INTRAMUSCULAR; INTRAVENOUS at 20:33

## 2022-01-01 RX ADMIN — QUETIAPINE FUMARATE 25 MG: 25 TABLET ORAL at 22:30

## 2022-01-01 RX ADMIN — FINASTERIDE 5 MG: 5 TABLET, FILM COATED ORAL at 08:41

## 2022-01-01 RX ADMIN — LORAZEPAM 1 MG: 2 CONCENTRATE ORAL at 20:27

## 2022-01-01 RX ADMIN — FINASTERIDE 5 MG: 5 TABLET, FILM COATED ORAL at 08:13

## 2022-01-01 RX ADMIN — GUAIFENESIN 200 MG: 200 SOLUTION ORAL at 20:45

## 2022-01-01 RX ADMIN — FAMOTIDINE 40 MG: 20 TABLET ORAL at 08:57

## 2022-01-01 RX ADMIN — Medication 10 ML: at 20:20

## 2022-01-01 RX ADMIN — FAMOTIDINE 40 MG: 20 TABLET ORAL at 14:48

## 2022-01-01 RX ADMIN — TAZOBACTAM SODIUM AND PIPERACILLIN SODIUM 3.38 G: 375; 3 INJECTION, SOLUTION INTRAVENOUS at 01:00

## 2022-01-01 RX ADMIN — SEVELAMER CARBONATE 2400 MG: 800 TABLET, FILM COATED ORAL at 14:14

## 2022-01-01 RX ADMIN — DONEPEZIL HYDROCHLORIDE 10 MG: 10 TABLET, FILM COATED ORAL at 21:17

## 2022-01-01 RX ADMIN — FINASTERIDE 5 MG: 5 TABLET, FILM COATED ORAL at 08:57

## 2022-01-01 RX ADMIN — GUAIFENESIN 200 MG: 200 SOLUTION ORAL at 20:42

## 2022-01-01 RX ADMIN — BENZONATATE 100 MG: 100 CAPSULE ORAL at 17:44

## 2022-01-01 RX ADMIN — GUAIFENESIN 200 MG: 200 SOLUTION ORAL at 20:13

## 2022-01-01 RX ADMIN — QUETIAPINE FUMARATE 25 MG: 25 TABLET ORAL at 21:12

## 2022-01-01 RX ADMIN — Medication 10 ML: at 14:01

## 2022-01-01 RX ADMIN — DILTIAZEM HYDROCHLORIDE 180 MG: 180 CAPSULE, COATED, EXTENDED RELEASE ORAL at 22:30

## 2022-01-01 RX ADMIN — HYDROCODONE BITARTRATE AND HOMATROPINE METHYLBROMIDE 5 ML: 5; 1.5 SOLUTION ORAL at 21:22

## 2022-01-01 RX ADMIN — HYDROMORPHONE HYDROCHLORIDE 1 MG: 1 INJECTION, SOLUTION INTRAMUSCULAR; INTRAVENOUS; SUBCUTANEOUS at 04:26

## 2022-01-01 RX ADMIN — CARVEDILOL 6.25 MG: 6.25 TABLET, FILM COATED ORAL at 08:36

## 2022-01-01 RX ADMIN — BENZONATATE 100 MG: 100 CAPSULE ORAL at 08:57

## 2022-01-01 RX ADMIN — DILTIAZEM HYDROCHLORIDE 180 MG: 180 CAPSULE, COATED, EXTENDED RELEASE ORAL at 09:14

## 2022-01-01 RX ADMIN — QUETIAPINE FUMARATE 25 MG: 25 TABLET ORAL at 21:21

## 2022-01-01 RX ADMIN — TAZOBACTAM SODIUM AND PIPERACILLIN SODIUM 3.38 G: 375; 3 INJECTION, SOLUTION INTRAVENOUS at 18:08

## 2022-01-01 RX ADMIN — LORAZEPAM 1 MG: 2 CONCENTRATE ORAL at 08:52

## 2022-01-01 RX ADMIN — HEPARIN SODIUM 5000 UNITS: 5000 INJECTION INTRAVENOUS; SUBCUTANEOUS at 02:00

## 2022-01-01 RX ADMIN — ACETAMINOPHEN 650 MG: 325 TABLET ORAL at 03:17

## 2022-01-01 RX ADMIN — Medication 10 ML: at 20:15

## 2022-01-01 RX ADMIN — FINASTERIDE 5 MG: 5 TABLET, FILM COATED ORAL at 08:36

## 2022-01-01 RX ADMIN — DILTIAZEM HYDROCHLORIDE 10 MG: 5 INJECTION INTRAVENOUS at 12:25

## 2022-01-01 RX ADMIN — BENZONATATE 200 MG: 100 CAPSULE ORAL at 20:19

## 2022-01-01 RX ADMIN — CARVEDILOL 6.25 MG: 6.25 TABLET, FILM COATED ORAL at 18:23

## 2022-01-01 RX ADMIN — HYDROMORPHONE HYDROCHLORIDE 1.5 MG: 2 INJECTION, SOLUTION INTRAMUSCULAR; INTRAVENOUS; SUBCUTANEOUS at 08:41

## 2022-01-01 RX ADMIN — Medication 10 ML: at 09:14

## 2022-01-01 RX ADMIN — CARVEDILOL 6.25 MG: 6.25 TABLET, FILM COATED ORAL at 08:37

## 2022-01-01 RX ADMIN — QUETIAPINE FUMARATE 25 MG: 25 TABLET ORAL at 20:19

## 2022-01-01 RX ADMIN — Medication 10 ML: at 20:45

## 2022-01-01 RX ADMIN — CARVEDILOL 6.25 MG: 6.25 TABLET, FILM COATED ORAL at 08:13

## 2022-01-01 RX ADMIN — POLYETHYLENE GLYCOL 3350 17 G: 17 POWDER, FOR SOLUTION ORAL at 14:13

## 2022-01-01 RX ADMIN — OLANZAPINE 7.5 MG: 7.5 TABLET ORAL at 00:50

## 2022-01-01 RX ADMIN — HYDROMORPHONE HYDROCHLORIDE 0.5 MG: 1 INJECTION, SOLUTION INTRAMUSCULAR; INTRAVENOUS; SUBCUTANEOUS at 04:31

## 2022-01-01 RX ADMIN — METOPROLOL TARTRATE 2.5 MG: 1 INJECTION, SOLUTION INTRAVENOUS at 01:13

## 2022-01-01 RX ADMIN — GLYCOPYRROLATE 0.4 MG: 0.2 INJECTION INTRAMUSCULAR; INTRAVENOUS at 00:30

## 2022-01-01 RX ADMIN — SEVELAMER CARBONATE 2400 MG: 800 TABLET, FILM COATED ORAL at 17:37

## 2022-01-01 RX ADMIN — QUETIAPINE FUMARATE 25 MG: 25 TABLET ORAL at 21:17

## 2022-01-01 RX ADMIN — FAMOTIDINE 40 MG: 20 TABLET ORAL at 07:54

## 2022-01-01 RX ADMIN — SEVELAMER CARBONATE 2400 MG: 800 TABLET, FILM COATED ORAL at 08:41

## 2022-01-01 RX ADMIN — Medication 10 ML: at 20:34

## 2022-01-01 RX ADMIN — SODIUM CHLORIDE 500 ML: 9 INJECTION, SOLUTION INTRAVENOUS at 22:43

## 2022-01-01 RX ADMIN — TAZOBACTAM SODIUM AND PIPERACILLIN SODIUM 3.38 G: 375; 3 INJECTION, SOLUTION INTRAVENOUS at 02:00

## 2022-01-01 RX ADMIN — QUETIAPINE FUMARATE 25 MG: 25 TABLET ORAL at 21:10

## 2022-01-01 RX ADMIN — POLYETHYLENE GLYCOL 3350 17 G: 17 POWDER, FOR SOLUTION ORAL at 14:49

## 2022-01-01 RX ADMIN — GLYCOPYRROLATE 0.4 MG: 0.2 INJECTION INTRAMUSCULAR; INTRAVENOUS at 12:52

## 2022-01-01 RX ADMIN — LIDOCAINE AND PRILOCAINE 1 APPLICATION: 25; 25 CREAM TOPICAL at 12:37

## 2022-01-01 RX ADMIN — BENZONATATE 200 MG: 100 CAPSULE ORAL at 21:17

## 2022-01-01 RX ADMIN — POLYETHYLENE GLYCOL 3350 17 G: 17 POWDER, FOR SOLUTION ORAL at 08:37

## 2022-01-01 RX ADMIN — QUETIAPINE FUMARATE 25 MG: 25 TABLET ORAL at 20:15

## 2022-01-01 RX ADMIN — HYDROCODONE BITARTRATE AND HOMATROPINE METHYLBROMIDE 5 ML: 5; 1.5 SOLUTION ORAL at 21:42

## 2022-01-01 RX ADMIN — Medication 10 ML: at 20:27

## 2022-01-01 RX ADMIN — SEVELAMER CARBONATE 2400 MG: 800 TABLET, FILM COATED ORAL at 17:04

## 2022-01-01 RX ADMIN — QUETIAPINE FUMARATE 25 MG: 25 TABLET ORAL at 20:12

## 2022-01-01 RX ADMIN — RIVAROXABAN 15 MG: 15 TABLET, FILM COATED ORAL at 18:24

## 2022-01-01 RX ADMIN — CARVEDILOL 6.25 MG: 6.25 TABLET, FILM COATED ORAL at 14:49

## 2022-01-01 RX ADMIN — SEVELAMER CARBONATE 2400 MG: 800 TABLET, FILM COATED ORAL at 12:36

## 2022-01-01 RX ADMIN — GUAIFENESIN 200 MG: 200 SOLUTION ORAL at 21:17

## 2022-01-01 RX ADMIN — HEPARIN SODIUM 5000 UNITS: 5000 INJECTION INTRAVENOUS; SUBCUTANEOUS at 22:30

## 2022-01-01 RX ADMIN — DONEPEZIL HYDROCHLORIDE 10 MG: 10 TABLET, FILM COATED ORAL at 21:10

## 2022-01-01 RX ADMIN — Medication 10 ML: at 08:37

## 2022-01-01 RX ADMIN — SEVELAMER CARBONATE 2400 MG: 800 TABLET, FILM COATED ORAL at 17:17

## 2022-01-01 RX ADMIN — CARVEDILOL 6.25 MG: 6.25 TABLET, FILM COATED ORAL at 17:24

## 2022-01-01 RX ADMIN — HYDROMORPHONE HYDROCHLORIDE 0.5 MG: 1 INJECTION, SOLUTION INTRAMUSCULAR; INTRAVENOUS; SUBCUTANEOUS at 20:27

## 2022-01-01 RX ADMIN — LORAZEPAM 0.5 MG: 2 CONCENTRATE ORAL at 18:41

## 2022-01-01 RX ADMIN — Medication 1 APPLICATION: at 14:01

## 2022-01-01 RX ADMIN — LORAZEPAM 1 MG: 2 CONCENTRATE ORAL at 12:52

## 2022-01-01 RX ADMIN — SEVELAMER CARBONATE 2400 MG: 800 TABLET, FILM COATED ORAL at 17:30

## 2022-01-01 RX ADMIN — SEVELAMER CARBONATE 2400 MG: 800 TABLET, FILM COATED ORAL at 12:26

## 2022-01-01 RX ADMIN — HEPARIN SODIUM 5000 UNITS: 5000 INJECTION INTRAVENOUS; SUBCUTANEOUS at 08:24

## 2022-01-01 RX ADMIN — HEPARIN SODIUM 5000 UNITS: 5000 INJECTION INTRAVENOUS; SUBCUTANEOUS at 09:16

## 2022-01-01 RX ADMIN — SEVELAMER CARBONATE 2400 MG: 800 TABLET, FILM COATED ORAL at 17:18

## 2022-01-01 RX ADMIN — CARVEDILOL 6.25 MG: 6.25 TABLET, FILM COATED ORAL at 08:57

## 2022-01-01 RX ADMIN — SEVELAMER CARBONATE 2400 MG: 800 TABLET, FILM COATED ORAL at 11:36

## 2022-01-01 RX ADMIN — BENZONATATE 200 MG: 100 CAPSULE ORAL at 20:14

## 2022-01-01 RX ADMIN — DONEPEZIL HYDROCHLORIDE 10 MG: 10 TABLET, FILM COATED ORAL at 20:19

## 2022-01-01 RX ADMIN — SEVELAMER CARBONATE 2400 MG: 800 TABLET, FILM COATED ORAL at 12:07

## 2022-01-01 RX ADMIN — GLYCOPYRROLATE 0.4 MG: 0.2 INJECTION INTRAMUSCULAR; INTRAVENOUS at 17:30

## 2022-01-01 RX ADMIN — Medication 10 ML: at 08:59

## 2022-01-01 RX ADMIN — Medication 10 ML: at 21:12

## 2022-01-01 RX ADMIN — FAMOTIDINE 40 MG: 20 TABLET ORAL at 08:37

## 2022-01-01 RX ADMIN — GLYCOPYRROLATE 0.4 MG: 0.2 INJECTION INTRAMUSCULAR; INTRAVENOUS at 04:26

## 2022-01-01 RX ADMIN — FAMOTIDINE 40 MG: 20 TABLET ORAL at 14:14

## 2022-01-01 RX ADMIN — RIVAROXABAN 15 MG: 15 TABLET, FILM COATED ORAL at 18:34

## 2022-01-01 RX ADMIN — FINASTERIDE 5 MG: 5 TABLET, FILM COATED ORAL at 14:13

## 2022-01-01 RX ADMIN — FAMOTIDINE 40 MG: 20 TABLET ORAL at 08:13

## 2022-01-01 RX ADMIN — FINASTERIDE 5 MG: 5 TABLET, FILM COATED ORAL at 22:30

## 2022-01-01 RX ADMIN — LORAZEPAM 1 MG: 2 CONCENTRATE ORAL at 20:33

## 2022-01-01 RX ADMIN — CARVEDILOL 6.25 MG: 6.25 TABLET, FILM COATED ORAL at 07:52

## 2022-01-01 RX ADMIN — Medication 10 ML: at 08:13

## 2022-01-01 RX ADMIN — HEPARIN SODIUM 5000 UNITS: 5000 INJECTION INTRAVENOUS; SUBCUTANEOUS at 21:11

## 2022-01-01 RX ADMIN — SEVELAMER CARBONATE 2400 MG: 800 TABLET, FILM COATED ORAL at 08:57

## 2022-01-01 RX ADMIN — DILTIAZEM HYDROCHLORIDE 180 MG: 180 CAPSULE, COATED, EXTENDED RELEASE ORAL at 09:47

## 2022-01-01 RX ADMIN — SEVELAMER CARBONATE 2400 MG: 800 TABLET, FILM COATED ORAL at 11:59

## 2022-01-01 RX ADMIN — RIVAROXABAN 15 MG: 15 TABLET, FILM COATED ORAL at 18:53

## 2022-01-01 RX ADMIN — RIVAROXABAN 15 MG: 15 TABLET, FILM COATED ORAL at 17:24

## 2022-01-01 RX ADMIN — HYDROMORPHONE HYDROCHLORIDE 1 MG: 1 INJECTION, SOLUTION INTRAMUSCULAR; INTRAVENOUS; SUBCUTANEOUS at 00:30

## 2022-01-01 RX ADMIN — FINASTERIDE 5 MG: 5 TABLET, FILM COATED ORAL at 08:37

## 2022-01-01 RX ADMIN — ACETAMINOPHEN 650 MG: 325 TABLET ORAL at 22:30

## 2022-01-01 RX ADMIN — GLYCOPYRROLATE 0.4 MG: 0.2 INJECTION INTRAMUSCULAR; INTRAVENOUS at 10:07

## 2022-01-01 RX ADMIN — Medication 10 ML: at 14:50

## 2022-01-01 RX ADMIN — Medication 1 APPLICATION: at 08:30

## 2022-01-01 RX ADMIN — RIVAROXABAN 15 MG: 15 TABLET, FILM COATED ORAL at 17:17

## 2022-01-01 RX ADMIN — DONEPEZIL HYDROCHLORIDE 10 MG: 10 TABLET, FILM COATED ORAL at 20:12

## 2022-01-01 RX ADMIN — FAMOTIDINE 40 MG: 20 TABLET ORAL at 09:14

## 2022-01-01 RX ADMIN — HYDROCODONE BITARTRATE AND HOMATROPINE METHYLBROMIDE 5 ML: 5; 1.5 SOLUTION ORAL at 21:17

## 2022-01-01 RX ADMIN — POLYETHYLENE GLYCOL 3350 17 G: 17 POWDER, FOR SOLUTION ORAL at 07:52

## 2022-01-01 RX ADMIN — FINASTERIDE 5 MG: 5 TABLET, FILM COATED ORAL at 07:52

## 2022-01-01 RX ADMIN — Medication 10 ML: at 08:32

## 2022-01-01 RX ADMIN — ACETAMINOPHEN 650 MG: 325 TABLET ORAL at 21:12

## 2022-01-01 RX ADMIN — Medication 10 ML: at 09:16

## 2022-01-01 RX ADMIN — BENZONATATE 100 MG: 100 CAPSULE ORAL at 09:22

## 2022-01-01 RX ADMIN — DONEPEZIL HYDROCHLORIDE 10 MG: 10 TABLET, FILM COATED ORAL at 20:42

## 2022-01-01 RX ADMIN — GUAIFENESIN 200 MG: 200 SOLUTION ORAL at 20:14

## 2022-01-01 RX ADMIN — HEPARIN SODIUM 5000 UNITS: 5000 INJECTION INTRAVENOUS; SUBCUTANEOUS at 09:47

## 2022-01-01 RX ADMIN — SEVELAMER CARBONATE 2400 MG: 800 TABLET, FILM COATED ORAL at 08:13

## 2022-01-01 RX ADMIN — POLYETHYLENE GLYCOL 3350 17 G: 17 POWDER, FOR SOLUTION ORAL at 09:47

## 2022-01-01 RX ADMIN — GLYCOPYRROLATE 0.4 MG: 0.2 INJECTION INTRAMUSCULAR; INTRAVENOUS at 08:41

## 2022-01-01 RX ADMIN — BENZONATATE 200 MG: 100 CAPSULE ORAL at 21:21

## 2022-01-01 RX ADMIN — AMPICILLIN SODIUM AND SULBACTAM SODIUM 3 G: 2; 1 INJECTION, POWDER, FOR SOLUTION INTRAMUSCULAR; INTRAVENOUS at 14:49

## 2022-01-01 RX ADMIN — LORAZEPAM 1 MG: 2 CONCENTRATE ORAL at 00:30

## 2022-01-01 RX ADMIN — CARVEDILOL 6.25 MG: 6.25 TABLET, FILM COATED ORAL at 14:14

## 2022-01-01 RX ADMIN — DONEPEZIL HYDROCHLORIDE 10 MG: 10 TABLET, FILM COATED ORAL at 20:45

## 2022-01-01 RX ADMIN — POLYETHYLENE GLYCOL 3350 17 G: 17 POWDER, FOR SOLUTION ORAL at 09:14

## 2022-01-01 RX ADMIN — OLANZAPINE 5 MG: 10 INJECTION, POWDER, FOR SOLUTION INTRAMUSCULAR at 23:45

## 2022-01-01 RX ADMIN — SEVELAMER CARBONATE 2400 MG: 800 TABLET, FILM COATED ORAL at 07:52

## 2022-01-01 RX ADMIN — LORAZEPAM 1 MG: 2 CONCENTRATE ORAL at 04:31

## 2022-01-01 RX ADMIN — CARVEDILOL 6.25 MG: 6.25 TABLET, FILM COATED ORAL at 17:04

## 2022-01-01 RX ADMIN — SEVELAMER CARBONATE 2400 MG: 800 TABLET, FILM COATED ORAL at 11:47

## 2022-01-01 RX ADMIN — LORAZEPAM 1 MG: 2 CONCENTRATE ORAL at 17:30

## 2022-01-01 RX ADMIN — CARVEDILOL 6.25 MG: 6.25 TABLET, FILM COATED ORAL at 17:17

## 2022-01-01 RX ADMIN — HYDROMORPHONE HYDROCHLORIDE 0.5 MG: 1 INJECTION, SOLUTION INTRAMUSCULAR; INTRAVENOUS; SUBCUTANEOUS at 16:22

## 2022-01-01 RX ADMIN — LORAZEPAM 1 MG: 2 CONCENTRATE ORAL at 00:24

## 2022-01-01 RX ADMIN — METOPROLOL TARTRATE 5 MG: 1 INJECTION, SOLUTION INTRAVENOUS at 08:29

## 2022-01-01 RX ADMIN — QUETIAPINE FUMARATE 25 MG: 25 TABLET ORAL at 20:42

## 2022-01-01 RX ADMIN — VANCOMYCIN HYDROCHLORIDE 1500 MG: 10 INJECTION, POWDER, LYOPHILIZED, FOR SOLUTION INTRAVENOUS at 04:27

## 2022-01-01 RX ADMIN — LORAZEPAM 1 MG: 2 CONCENTRATE ORAL at 04:25

## 2022-01-01 RX ADMIN — AMPICILLIN SODIUM AND SULBACTAM SODIUM 3 G: 2; 1 INJECTION, POWDER, FOR SOLUTION INTRAMUSCULAR; INTRAVENOUS at 10:27

## 2022-01-01 RX ADMIN — QUETIAPINE FUMARATE 25 MG: 25 TABLET ORAL at 20:45

## 2022-01-01 RX ADMIN — FAMOTIDINE 40 MG: 20 TABLET ORAL at 08:36

## 2022-01-01 RX ADMIN — AZITHROMYCIN MONOHYDRATE 500 MG: 500 INJECTION, POWDER, LYOPHILIZED, FOR SOLUTION INTRAVENOUS at 22:36

## 2022-01-01 RX ADMIN — SEVELAMER CARBONATE 2400 MG: 800 TABLET, FILM COATED ORAL at 08:36

## 2022-01-01 RX ADMIN — BENZONATATE 100 MG: 100 CAPSULE ORAL at 08:51

## 2022-01-01 RX ADMIN — DONEPEZIL HYDROCHLORIDE 10 MG: 10 TABLET, FILM COATED ORAL at 20:15

## 2022-01-01 RX ADMIN — FINASTERIDE 5 MG: 5 TABLET, FILM COATED ORAL at 09:14

## 2022-01-01 RX ADMIN — HYDROMORPHONE HYDROCHLORIDE 0.5 MG: 1 INJECTION, SOLUTION INTRAMUSCULAR; INTRAVENOUS; SUBCUTANEOUS at 08:52

## 2022-01-01 RX ADMIN — Medication 10 ML: at 08:42

## 2022-01-01 RX ADMIN — GUAIFENESIN 200 MG: 200 SOLUTION ORAL at 20:19

## 2022-01-01 RX ADMIN — CEFTRIAXONE 1 G: 1 INJECTION, POWDER, FOR SOLUTION INTRAMUSCULAR; INTRAVENOUS at 21:16

## 2022-01-01 RX ADMIN — HYDROCODONE BITARTRATE AND HOMATROPINE METHYLBROMIDE 5 ML: 5; 1.5 SOLUTION ORAL at 20:14

## 2022-01-01 RX ADMIN — HYDROMORPHONE HYDROCHLORIDE 1 MG: 1 INJECTION, SOLUTION INTRAMUSCULAR; INTRAVENOUS; SUBCUTANEOUS at 17:30

## 2022-01-01 RX ADMIN — FAMOTIDINE 40 MG: 20 TABLET ORAL at 09:47

## 2022-01-01 RX ADMIN — DONEPEZIL HYDROCHLORIDE 10 MG: 10 TABLET, FILM COATED ORAL at 21:21

## 2022-01-01 RX ADMIN — BENZONATATE 200 MG: 100 CAPSULE ORAL at 20:12

## 2022-01-01 RX ADMIN — DONEPEZIL HYDROCHLORIDE 10 MG: 10 TABLET, FILM COATED ORAL at 21:13

## 2022-01-01 RX ADMIN — BENZONATATE 200 MG: 100 CAPSULE ORAL at 21:42

## 2022-01-01 RX ADMIN — SEVELAMER CARBONATE 2400 MG: 800 TABLET, FILM COATED ORAL at 18:23

## 2022-01-01 RX ADMIN — BENZONATATE 100 MG: 100 CAPSULE ORAL at 18:30

## 2022-01-01 RX ADMIN — FAMOTIDINE 40 MG: 20 TABLET ORAL at 09:16

## 2022-01-01 RX ADMIN — SEVELAMER CARBONATE 2400 MG: 800 TABLET, FILM COATED ORAL at 18:34

## 2022-01-01 RX ADMIN — FINASTERIDE 5 MG: 5 TABLET, FILM COATED ORAL at 09:47

## 2022-01-01 RX ADMIN — FINASTERIDE 5 MG: 5 TABLET, FILM COATED ORAL at 14:48

## 2022-01-01 RX ADMIN — Medication 10 ML: at 19:52

## 2022-01-01 RX ADMIN — POLYETHYLENE GLYCOL 3350 17 G: 17 POWDER, FOR SOLUTION ORAL at 08:13

## 2022-01-01 RX ADMIN — ACETAMINOPHEN 650 MG: 325 TABLET ORAL at 00:39

## 2022-04-12 NOTE — PROGRESS NOTES
DOS: 2022  NAME: Shaun Brewster   : 1930  PCP: Andrew Trujillo MD    Chief Complaint   Patient presents with   • Alzheimer's Disease     Son states he is doing very well       SUBJECTIVE  Neurological Problem:  89 y.o. RHW male with Alzheimers Dementia, Afib, ESRD on HD h/o prostate CA who presents today for f/u of dementia.  He is accompanied by his son Henrique.     Interval History:   **For detailed previous history, please see progress note dated 2021.    Mr. Brewster been followed by Dr. López for h/o Alzheimer's dementia, last seen by me in 2021, doing well on donepezil 10 mg qhs and seroquel 25 mg. He did not tolerate memantine d/t severe sedation.     Patient presents today, continues on donepezil 10 mg daily and Seroquel 25 mg nightly.  He continues to tolerate these well.  Son denies any adverse side effects.  Son reports patient symptoms are stable, no worsening.  No changes in personality or behavior.  Patient states he is doing well. Sleep is good, no nighttime wanderings. No hallucinations. Appetite is good, no recent weight loss, no problems swallowing, no choking.  Patient states he has had some recent congestion.  He continues to have 24-hour caregivers at his home.  Continues to read, likes westerns, play card games.  He is in a wheelchair today, but uses a walker at home.  He denies any changes in gait.  No recent falls.    Review of Systems:Review of Systems Above ROS reviewed    The following portions of the patient's history were reviewed and updated as appropriate: allergies, current medications, past family history, past medical history, past social history, past surgical history and problem list.    Current Medications:   Current Outpatient Medications:   •  acetaminophen (TYLENOL) 325 MG tablet, Take 2 tablets by mouth Every 4 (Four) Hours As Needed for Mild Pain ., Disp: , Rfl:   •  B Complex-C-Biotin-D-FA (Dialyvite 800 Plus D) 800 MCG wafer, CHEW & SWALLOW 1  TABLET DAILY, Disp: , Rfl:   •  B Complex-C-Folic Acid (RENAL) 1 MG capsule capsule, Take 1 capsule by mouth Daily., Disp: , Rfl:   •  Cinacalcet HCl (SENSIPAR PO), Take 30 mg by mouth., Disp: , Rfl:   •  famotidine (PEPCID) 40 MG tablet, Take 1 tablet by mouth Daily., Disp: 90 tablet, Rfl: 3  •  Fexofenadine HCl (MUCINEX ALLERGY PO), Take  by mouth 2 (Two) Times a Day., Disp: , Rfl:   •  finasteride (PROSCAR) 5 MG tablet, TAKE 1 TABLET DAILY, Disp: 90 tablet, Rfl: 3  •  lidocaine-prilocaine (EMLA) 2.5-2.5 % cream, APPLY SMALL AMOUNT TO ACCESS SITE (AVF) 1 HOUR BEFORE DIALYSIS. COVER WITH OCCLUSIVE DRESSING (SARAN WRAP), Disp: , Rfl:   •  Methoxy PEG-Epoetin Beta (MIRCERA IJ), 30 mcg Every 28 (Twenty-Eight) Days., Disp: , Rfl:   •  Methoxy PEG-Epoetin Beta (MIRCERA IJ), 75 mcg Every 28 (Twenty-Eight) Days., Disp: , Rfl:   •  Multiple Vitamins-Minerals (PRESERVISION AREDS PO), Take  by mouth 2 (Two) Times a Day., Disp: , Rfl:   •  ondansetron ODT (ZOFRAN-ODT) 4 MG disintegrating tablet, Take 1 tablet by mouth Every 8 (Eight) Hours As Needed for Nausea or Vomiting., Disp: 30 tablet, Rfl: 0  •  polyethylene glycol (MIRALAX) packet, Take 17 g by mouth Every 3 (Three) Days., Disp: , Rfl:   •  QUEtiapine (SEROquel) 25 MG tablet, TAKE 1 TABLET NIGHTLY, Disp: 90 tablet, Rfl: 1  •  RENVELA 800 MG tablet, Take 800 mg by mouth 3 (Three) Times a Day. Take 3 tablets x3 daily, Disp: , Rfl:   •  rivaroxaban (Xarelto) 15 MG tablet, Take 1 tablet by mouth Daily. Take one tablet my mouth on Monday, Wednesday, Friday and Saturday (Patient taking differently: Take 15 mg by mouth Daily. Take one tablet my mouth on Monday, Wednesday, Friday and Saturday), Disp: 90 tablet, Rfl: 3  •  Dilt- MG 24 hr capsule, TAKE 1 CAPSULE EVERY NIGHT, Disp: 90 capsule, Rfl: 2  •  donepezil ODT (ARICEPT ODT) 10 MG disintegrating tablet, Place 1 tablet on the tongue Every Night for 90 days., Disp: 90 tablet, Rfl: 3  **I did not stop or change the  above medications.  Patient's medication list was updated to reflect medications they have reported as currently taking, including medication changes made by other providers.    OBJECTIVE  Vitals:    04/12/22 0937   BP: 116/68   Pulse: 68   Resp: 16     Body mass index is 21.67 kg/m².    Diagnostics:    Laboratory Results:         Lab Results   Component Value Date    WBC 6.26 12/24/2018    HGB 8.3 (L) 12/24/2018    HCT 25.2 (L) 12/24/2018    MCV 94.4 12/24/2018     12/24/2018     Lab Results   Component Value Date    GLUCOSE 84 12/23/2018    BUN 36 (H) 12/23/2018    CREATININE 5.26 (H) 12/23/2018    EGFRIFNONA 10 (L) 12/23/2018    EGFRIFAFRI  12/23/2018      Comment:      <15 Indicative of kidney failure.    BCR 6.8 (L) 12/23/2018    K 4.1 12/23/2018    CO2 19.8 (L) 12/23/2018    CALCIUM 8.4 (L) 12/23/2018    PROTENTOTREF 6.4 04/10/2015    ALBUMIN 2.30 (L) 12/23/2018    LABIL2 2.2 04/10/2015    AST 19 12/22/2018    ALT 18 12/22/2018     Lab Results   Component Value Date    TSH 2.22 04/10/2015     Lab Results   Component Value Date    NFFKVLOH26 190 (L) 02/10/2016       Physical Exam:  GENERAL: NAD, thin  HEENT: Normocephalic, atraumatic   COR: RRR  Resp: Even and unlabored  Extremities: No signs of distal embolization.   Skin: No rashes, lesions or ulcers.  Psychiatric: Normal mood and affect.    Neurological:   MS: Alert.  Oriented to self, son.  Spontaneous speech normal.  No neglect.  Follows commands.    CN: II-XII grossly normal except for decreased auditory acuity bilaterally  Motor: Normal strength and tone throughout.  Sensory: Intact to light touch in arms and legs  Station and Gait: Deferred, patient currently in wheelchair.  Coordination: Normal finger to nose bilaterally    Impression/Plan: Mr. Brewster presents for follow-up of ADD, continues to do well on donepezil 10 mg daily and Seroquel 24 mg nightly.  He had adverse effects to memantine.  We will continue the same regimen, we again reviewed  the importance of lifestyle modifications for cognitive health including mental activity, healthy diet, exercise as able.  We discussed the importance of continuing use of walker, falls prevention.  He will follow-up here in 6 months, sooner symptoms warrant.         There are no diagnoses linked to this encounter.    Coding      Dictated using Dragon

## 2022-04-14 NOTE — PROGRESS NOTES
Date of Office Visit: 22  Encounter Provider: STEPHANIE Prater  Place of Service: [facility]  Patient Name: Shaun Brewster  :1930    Chief Complaint   Patient presents with   • Chronic atrial fibrillation    • Hypertension   • Follow-up   :     HPI: Shaun Brewster is a 89 y.o. male  with premature ventricular contraction, nonsustained ventricular arrhythmias, normal cardiac catheterization, chronic atrial fibrillation, end-stage renal disease on dialysis, dilated aortic root, and memory issues.     He previously was followed by Dr. Ponce and is now followed by Dr. Fox.  I have reviewed his medical record.     His son is a physician.      He has had issues with premature ventricular contractions, nonsustained ventricular tachycardia in the setting of normal left ventricular systolic function and normal coronary arteries.  He developed chronic atrial fibrillation but could not tolerate  amiodarone.  In 2018 he had an echocardiogram which showed normal left ventricular systolic function, no real significant valvular disease and mildly enlarged aortic root.  He later was switched from warfarin to Xarelto at 15 mg daily.  In 2020 his heart rate was elevated 111 and diltiazem was increased. He took Xarelto only 4 days a week.       He presents today for reassessment.  He continues on hemodialysis and reportedly has no chest pain or shortness of breath or swelling.  He was having some nosebleeds which improved after he started using a humidifier.  He has 3 humidifiers.  He has not had a nosebleed in over a month.  No blood in the urine or stool.        Allergies   Allergen Reactions   • Amiodarone Dizziness   • Nitrofurantoin Unknown - Low Severity           Family and social history reviewed.     ROS  All other systems were reviewed and are negative          Objective:     Vitals:    22 1054   BP: 108/50   BP Location: Right arm   Patient  "Position: Sitting   Pulse: 92   Weight: 78.6 kg (173 lb 3.2 oz)   Height: 73 cm (28.74\")     Body mass index is 147.43 kg/m².    PHYSICAL EXAM:  Pulmonary:      Breath sounds: Examination of the right-lower field reveals decreased breath sounds. Decreased breath sounds present.   Cardiovascular:      Normal rate. Irregularly irregular rhythm.         Procedures      Current Outpatient Medications   Medication Sig Dispense Refill   • acetaminophen (TYLENOL) 325 MG tablet Take 2 tablets by mouth Every 4 (Four) Hours As Needed for Mild Pain .     • Cyanocobalamin (Vitamin B-12) 1000 MCG sublingual tablet Place  under the tongue Daily.     • Dilt- MG 24 hr capsule TAKE 1 CAPSULE EVERY NIGHT 90 capsule 2   • famotidine (PEPCID) 40 MG tablet Take 1 tablet by mouth Daily. 90 tablet 3   • Fexofenadine HCl (MUCINEX ALLERGY PO) Take  by mouth 2 (Two) Times a Day.     • finasteride (PROSCAR) 5 MG tablet TAKE 1 TABLET DAILY 90 tablet 3   • lidocaine-prilocaine (EMLA) 2.5-2.5 % cream APPLY SMALL AMOUNT TO ACCESS SITE (AVF) 1 HOUR BEFORE DIALYSIS. COVER WITH OCCLUSIVE DRESSING (SARAN WRAP)     • Multiple Vitamins-Minerals (PRESERVISION AREDS PO) Take  by mouth 2 (Two) Times a Day.     • multivitamin (MULTIPLE VITAMIN PO) Take  by mouth Daily.     • ondansetron ODT (ZOFRAN-ODT) 4 MG disintegrating tablet Take 1 tablet by mouth Every 8 (Eight) Hours As Needed for Nausea or Vomiting. 30 tablet 0   • polyethylene glycol (MIRALAX) packet Take 17 g by mouth Every 3 (Three) Days.     • QUEtiapine (SEROquel) 25 MG tablet TAKE 1 TABLET NIGHTLY 90 tablet 1   • RENVELA 800 MG tablet Take 800 mg by mouth 3 (Three) Times a Day. Take 3 tablets x3 daily     • rivaroxaban (Xarelto) 15 MG tablet Take 1 tablet by mouth Daily. Take one tablet my mouth on Monday, Wednesday, Friday and Saturday (Patient taking differently: Take 15 mg by mouth Daily. Take one tablet my mouth on Monday, Wednesday, Friday and Saturday) 90 tablet 3   • Wheat " Dextrin (BENEFIBER PO) Take  by mouth Daily.     • donepezil ODT (ARICEPT ODT) 10 MG disintegrating tablet Place 1 tablet on the tongue Every Night for 90 days. 90 tablet 3     No current facility-administered medications for this visit.     Assessment:      No diagnosis found.     No orders of the defined types were placed in this encounter.        Plan:       1. This is a 89-year-old with a history of PVCs, nonsustained ventricular tachycardia, normal LV function, and normal cardiac catheterization. Continue the same. He will follow up in 6 months.  2. Chronic Atrial fibrillation, in atrial fibrillation. He could not tolerate amiodarone.  The patient's CHADS2-VASc score is 3.  His rate is controlled on current dose diltiazem.  He is to continue Xarelto 4 days a week on Monday, Wednesday, Friday and Saturday.  3.  Thoracic aortic aneurysm measuring 4.5 cm at the aortic root on echo in 2018.  He has previously declined further surveillance and does not wish to be a surgical candidate  4.  End-stage renal disease on dialysis Monday Wednesday Friday  5.   Memory issues on Aricept.  He requires a caregiver 24/7  6.  Epistaxis-recently improved with humidifiers              It has been a pleasure to participate in this patient's care.      Thank you,  STEPHANIE Prater      **I used Dragon to dictate this note:**

## 2022-04-21 NOTE — TELEPHONE ENCOUNTER
About 3 weeks ago patient began with a cough that was purulent in nature no fever or chills.  COVID test at that time was negative.  Patient was treated for bronchitis with Augmentin and a tapering dose of prednisone.  Patient seemed to improve from the purulent cough however he has continued to have a dry cough especially worse at night.  He had chest x-ray done on 4/19/2022 which showed no pneumonia.  Earlier this week we restarted prednisone and Tessalon but today patient is feeling much worse, last evening his O2 sat was 91.  Patient was instructed this morning to go to ER for further evaluation

## 2022-04-21 NOTE — ED TRIAGE NOTES
Patient sent from PCP for a cough that started one and a half weeks ago. PCP started patient on prednisone and cough medicine and cough is not getting any better.

## 2022-04-21 NOTE — ED PROVIDER NOTES
EMERGENCY DEPARTMENT ENCOUNTER    Room Number:  32/32  Date seen:  4/21/2022  Time seen: 12:21 EDT  PCP: Andrew Trujillo MD  Historian: pt, son    HPI:  Chief complaint:cough, SOA  A complete HPI/ROS/PMH/PSH/SH/FH are unobtainable due to: Alzheimer's  Context:Shaun Brewster is a 89 y.o. male with history of chronic atrial fib, V. Tach, dementia, end-stage renal disease on dialysis who presents to the ED with c/o 3 to 4 days of moderate cough and shortness of breath for which he was recently prescribed Prednisone. He does deny any chest pain, chest tightness or palpitations.   Patient is poor historian due to his dementia.  Other history is limited. His cardiologist is Dr. Fox and he is on Cardiazem.     Patient was placed in face mask in first look. Patient was wearing facemask when I entered the room and throughout our encounter. I wore full protective equipment throughout this patient encounter including a N95 face mask, eye shield and gloves. Hand hygiene/washing of hands was performed before donning protective equipment and after removal when leaving the room.      MEDICAL RECORD REVIEW      ALLERGIES  Amiodarone and Nitrofurantoin    PAST MEDICAL HISTORY  Active Ambulatory Problems     Diagnosis Date Noted   • Ventricular tachycardia (HCC) 08/04/2016   • Chronic atrial fibrillation (HCC) 08/04/2016   • Hypertension 12/09/2018   • ESRD (end stage renal disease) (HCC) 12/09/2018   • Closed fracture of multiple ribs of right side 12/09/2018   • Pleural effusion on right 12/09/2018   • Kidney cysts 12/09/2018   • Liver cyst 12/09/2018   • Acute biliary pancreatitis without infection or necrosis 12/22/2018   • Late onset Alzheimer's disease without behavioral disturbance (HCC) 02/04/2021   • At risk for falls 02/04/2021     Resolved Ambulatory Problems     Diagnosis Date Noted   • Small bowel obstruction (HCC) 12/09/2018   • Leukocytosis 12/09/2018   • Metabolic encephalopathy 12/12/2018     Past Medical  History:   Diagnosis Date   • Anemia    • 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HCC)    • Arthritis    • Atypical chest pain    • Blind right eye    • Chronic kidney disease (CKD), stage V (HCC)    • Difficulty walking    • Dizziness    • Dyslipidemia    • Esophageal reflux    • Esophagitis, reflux    • Femur fracture, right (HCC)    • HL (hearing loss)    • Macular degeneration    • Malignant neoplasm of prostate (HCC)    • Memory loss    • Nephrolithiasis    • Nonspecific abnormal finding    • Paroxysmal atrial fibrillation (HCC)    • Postnasal drip    • Premature ventricular contractions    • Renal disease    • Throat pain    • Urge incontinence of urine        PAST SURGICAL HISTORY  Past Surgical History:   Procedure Laterality Date   • ARTERIOVENOUS FISTULA Left 2015   • HAND SURGERY Bilateral    • HIP SURGERY     • INGUINAL HERNIA REPAIR     • KNEE SURGERY     • OTHER SURGICAL HISTORY      cardiac cath procedure summary normal, corneal lasik   • REPLACEMENT TOTAL KNEE      left    • SHOULDER SURGERY     • SKIN CANCER EXCISION     • TONSILLECTOMY AND ADENOIDECTOMY         FAMILY HISTORY  Family History   Problem Relation Age of Onset   • Other Brother         acute bacterial endocarditis       SOCIAL HISTORY  Social History     Socioeconomic History   • Marital status:    Tobacco Use   • Smoking status: Former Smoker   • Smokeless tobacco: Never Used   • Tobacco comment: denies caffeine use   Vaping Use   • Vaping Use: Never used   Substance and Sexual Activity   • Alcohol use: No   • Drug use: No   • Sexual activity: Defer       REVIEW OF SYSTEMS  Review of Systems    All systems reviewed and negative except for those discussed in HPI.     PHYSICAL EXAM    ED Triage Vitals [04/21/22 1204]   Temp Heart Rate Resp BP SpO2   99.1 °F (37.3 °C) (!) 153 16 -- 95 %      Temp src Heart Rate Source Patient Position BP Location FiO2 (%)   -- -- -- -- --     Physical Exam    I have reviewed the triage vital signs and nursing  notes.      GENERAL: not distressed, elderly  HENT: nares patent, [place]  EYES: no scleral icterus  NECK: no ROM limitations  CV: afib with RVR, no murmur, no rubs, no gallups  RESPIRATORY: normal effort, CTAB  ABDOMEN: soft  : deferred  MUSCULOSKELETAL: no deformity  NEURO: alert, moves all extremities, follows commands  SKIN: warm, dry    LAB RESULTS  Recent Results (from the past 24 hour(s))   ECG 12 Lead    Collection Time: 04/21/22 12:12 PM   Result Value Ref Range    QT Interval 319 ms   Respiratory Panel PCR w/COVID-19(SARS-CoV-2) RYAN/LORETTA/SHANNON/PAD/COR/MAD/QUINTIN In-House, NP Swab in UTM/VTM, 3-4 HR TAT - Swab, Nasopharynx    Collection Time: 04/21/22 12:21 PM    Specimen: Nasopharynx; Swab   Result Value Ref Range    ADENOVIRUS, PCR Not Detected Not Detected    Coronavirus 229E Not Detected Not Detected    Coronavirus HKU1 Not Detected Not Detected    Coronavirus NL63 Not Detected Not Detected    Coronavirus OC43 Not Detected Not Detected    COVID19 Not Detected Not Detected - Ref. Range    Human Metapneumovirus Not Detected Not Detected    Human Rhinovirus/Enterovirus Not Detected Not Detected    Influenza A PCR Not Detected Not Detected    Influenza B PCR Not Detected Not Detected    Parainfluenza Virus 1 Not Detected Not Detected    Parainfluenza Virus 2 Not Detected Not Detected    Parainfluenza Virus 3 Not Detected Not Detected    Parainfluenza Virus 4 Not Detected Not Detected    RSV, PCR Not Detected Not Detected    Bordetella pertussis pcr Not Detected Not Detected    Bordetella parapertussis PCR Not Detected Not Detected    Chlamydophila pneumoniae PCR Not Detected Not Detected    Mycoplasma pneumo by PCR Not Detected Not Detected   Comprehensive Metabolic Panel    Collection Time: 04/21/22 12:22 PM    Specimen: Blood   Result Value Ref Range    Glucose 118 (H) 65 - 99 mg/dL    BUN 37 (H) 8 - 23 mg/dL    Creatinine 5.36 (H) 0.76 - 1.27 mg/dL    Sodium 137 136 - 145 mmol/L    Potassium 5.1 3.5 - 5.2 mmol/L     Chloride 96 (L) 98 - 107 mmol/L    CO2 26.0 22.0 - 29.0 mmol/L    Calcium 10.1 (H) 8.2 - 9.6 mg/dL    Total Protein 6.8 6.0 - 8.5 g/dL    Albumin 3.90 3.50 - 5.20 g/dL    ALT (SGPT) 12 1 - 41 U/L    AST (SGOT) 15 1 - 40 U/L    Alkaline Phosphatase 110 39 - 117 U/L    Total Bilirubin 0.5 0.0 - 1.2 mg/dL    Globulin 2.9 gm/dL    A/G Ratio 1.3 g/dL    BUN/Creatinine Ratio 6.9 (L) 7.0 - 25.0    Anion Gap 15.0 5.0 - 15.0 mmol/L    eGFR 9.5 (L) >60.0 mL/min/1.73   Protime-INR    Collection Time: 04/21/22 12:22 PM    Specimen: Blood   Result Value Ref Range    Protime 17.9 (H) 11.7 - 14.2 Seconds    INR 1.48 (H) 0.90 - 1.10   BNP    Collection Time: 04/21/22 12:22 PM    Specimen: Blood   Result Value Ref Range    proBNP 21,301.0 (H) 0.0 - 1,800.0 pg/mL   Troponin    Collection Time: 04/21/22 12:22 PM    Specimen: Blood   Result Value Ref Range    Troponin T 0.177 (C) 0.000 - 0.030 ng/mL   Magnesium    Collection Time: 04/21/22 12:22 PM    Specimen: Blood   Result Value Ref Range    Magnesium 2.3 1.7 - 2.3 mg/dL   TSH    Collection Time: 04/21/22 12:22 PM    Specimen: Blood   Result Value Ref Range    TSH 2.740 0.270 - 4.200 uIU/mL   T4, Free    Collection Time: 04/21/22 12:22 PM    Specimen: Blood   Result Value Ref Range    Free T4 1.06 0.93 - 1.70 ng/dL   CBC Auto Differential    Collection Time: 04/21/22 12:22 PM    Specimen: Blood   Result Value Ref Range    WBC 15.55 (H) 3.40 - 10.80 10*3/mm3    RBC 3.22 (L) 4.14 - 5.80 10*6/mm3    Hemoglobin 10.0 (L) 13.0 - 17.7 g/dL    Hematocrit 31.4 (L) 37.5 - 51.0 %    MCV 97.5 (H) 79.0 - 97.0 fL    MCH 31.1 26.6 - 33.0 pg    MCHC 31.8 31.5 - 35.7 g/dL    RDW 15.5 (H) 12.3 - 15.4 %    RDW-SD 54.5 (H) 37.0 - 54.0 fl    MPV 10.3 6.0 - 12.0 fL    Platelets 250 140 - 450 10*3/mm3    Neutrophil % 91.8 (H) 42.7 - 76.0 %    Lymphocyte % 2.9 (L) 19.6 - 45.3 %    Monocyte % 4.2 (L) 5.0 - 12.0 %    Eosinophil % 0.4 0.3 - 6.2 %    Basophil % 0.4 0.0 - 1.5 %    Immature Grans % 0.3 0.0 -  0.5 %    Neutrophils, Absolute 14.27 (H) 1.70 - 7.00 10*3/mm3    Lymphocytes, Absolute 0.45 (L) 0.70 - 3.10 10*3/mm3    Monocytes, Absolute 0.66 0.10 - 0.90 10*3/mm3    Eosinophils, Absolute 0.06 0.00 - 0.40 10*3/mm3    Basophils, Absolute 0.06 0.00 - 0.20 10*3/mm3    Immature Grans, Absolute 0.05 0.00 - 0.05 10*3/mm3    nRBC 0.0 0.0 - 0.2 /100 WBC         RADIOLOGY RESULTS  CT Chest Without Contrast Diagnostic    Result Date: 4/21/2022  CT OF THE CHEST WITHOUT CONTRAST  HISTORY: 89-year-old male with a history of shortness of air, elevated heart rate on recent steroids.  TECHNIQUE: Contiguous axial images were obtained through the chest without IV contrast.  COMPARISON: Chest x-ray, 04/21/2022.  FINDINGS: The lungs are clear of consolidation. Mild bilateral lower lobe subpleural interstitial opacification is noted. There is a small dependently located right pleural effusion that measures on the order of 0.9 cm in depth. There are prominent mediastinal lymph nodes, the largest of which measures on the order of 2.1 x 1.6 cm in greatest transverse dimensions. The heart is enlarged. Moderately severe atheromatous calcification is seen throughout the coronary circulation. Severe atheromatous calcification is seen in the aortic arch and descending thoracic aorta. No suspicious lytic or sclerotic osseous lesions are visualized. Multiple remote healed right-sided rib fractures are noted. Innumerable cysts are seen throughout the visualized portions of the kidneys and are consistent with bilateral renal cysts. Multiple hepatic cysts, largest of which measures on the order of 5.8 cm in greatest dimension, are also noted.      1. There is mild bilateral lower lobe subpleural interstitial opacification which may represent mild bilateral lower lobe interstitial edema and/or pneumonia. A small right pleural effusion is noted. 2. Cardiomegaly. 3. There is evidence of bilateral polycystic kidney disease. Multiple hepatic cysts are  also noted.  Radiation dose reduction techniques were utilized, including automated exposure control and exposure modulation based on body size.         XR Chest 1 View    Result Date: 4/21/2022  XR CHEST 1 VW-  HISTORY: Male who is 89 years-old,  short of breath  TECHNIQUE: Frontal views of the chest  COMPARISON: 04/19/2022  FINDINGS: The heart is enlarged. Aorta is tortuous, calcified. Pulmonary vasculature is unremarkable. No focal pulmonary consolidation, pleural effusion, or pneumothorax. No acute osseous process.      No focal pulmonary consolidation. Cardiomegaly. Tortuous aorta. Follow-up as clinically indicated.  This report was finalized on 4/21/2022 1:00 PM by Dr. Mike Hernandez M.D.           PROGRESS, DATA ANALYSIS, CONSULTS AND MEDICAL DECISION MAKING  All labs have been independently reviewed by me.  All radiology studies have been reviewed by me and discussed with radiologist dictating the report.  EKG's independently viewed and interpreted by me unless stated otherwise. Discussion below represents my analysis of pertinent findings related to patient's condition, differential diagnosis, treatment plan and final disposition.     ED Course as of 04/21/22 1519   Thu Apr 21, 2022   1228 EKG     viewed by ER MD prior to my interpretation     EKG time: 1212  Rhythm/Rate: 144, A. fib with RVR  P waves and MO: Not applicable  QRS, axis: Normal QRS, LAD  ST and T waves: Repolarization abnormality likely rate related    Interpreted Contemporaneously by me, independently viewed  Compared with 12/24/2018 patient was 98 A. fib with multiple PVCs noted rate was definitely more controlled on that date.   [EW]   1344 Creatinine(!): 5.36  Patient is end-stage renal disease on dialysis [EW]   1357 I have discussed workup with patient and his son.  A fib is curently controlled.  I will have staff ambulate him with walker to see how he does.  [EW]   1408 WBC(!): 15.55  Restarted prednisone earlier this week [TD]    1408 Troponin T(!!): 0.177 [TD]   1514 Troponin T(!!): 0.177  MDM/dispo:  Pt with CKD which could be elevating his troponin.  pt has no chest pain.  He has been on recent Prednisone which could elevated WBC.  He has dialysis scheduled tomorrow and fluid will be removed which can help his shortness of breath.  Dr. Kaur and I have both discussed with family and offered admission.   The patient wishes to go home which I feel comfortable with.  He has around the clock personal care.  Strict RTER precautions given to patient.  [EW]      ED Course User Index  [EW] Patricia Aguirre APRN  [TD] Star Kaur II, MD     DDX: afib with RVR, SOA, pneumonia, viral respiratory illness     Reviewed pt's history and workup with Dr. Kaur.  After a bedside evaluation, Dr. Kaur agrees with the plan of care.    The patient's history, physical exam, and lab findings were discussed with the physician, who also performed a face to face history and physical exam.  I discussed all results and noted any abnormalities with patient.  Discussed absoute need to recheck abnormalities with their family physician.  I answered any of the patient's questions.  Discussed plan for discharge, as there is no emergent indication for admission.  Pt is agreeable and understands need for follow up and repeat testing.  Pt is aware that discharge does not mean that nothing is wrong but it indicates no emergency is present and they must continue care with their family physician.  Pt is discharged with instructions to follow up with primary care doctor to have their blood pressure rechecked.       Disposition vitals:  /82   Pulse (!) 137   Temp 99.1 °F (37.3 °C)   Resp 16   SpO2 97%       DIAGNOSIS  Final diagnoses:   Shortness of breath   Atrial fibrillation with RVR (HCC)   ESRD on dialysis (HCC)       FOLLOW UP   Andrew Trujillo MD  [address]  [place] [id number] [phone]    Schedule an appointment as  soon as possible for a visit in 1 day      Shaun Fox Jr., MD  [address] [place] [address]  [phone]    Schedule an appointment as soon as possible for a visit   to be seen within the week         Patricia Aguirre, APRN  04/21/22 1522

## 2022-04-22 NOTE — ED PROVIDER NOTES
MD ATTESTATION NOTE    The PALLAVI and I have discussed this patient's history, physical exam, and treatment plan.    I provided a substantive portion of the care of this patient. I personally performed the physical exam, in its entirety. The attached note describes my personal findings.      Shaun Brewster is a 89 y.o. male who presents to the ED c/o shortness of breath and cough.  Patient has had 3 days of cough with shortness of breath.  He was recently prescribed prednisone.      On exam:  GENERAL: not distressed  HENT: nares patent  EYES: no scleral icterus  CV: irregular rhythm, regular rate  RESPIRATORY: normal effort, clear to auscultation bilaterally  ABDOMEN: soft  MUSCULOSKELETAL: no deformity  NEURO: alert, moves all extremities, follows commands  SKIN: warm, dry    Labs  No results found for this or any previous visit (from the past 24 hour(s)).    Radiology  No Radiology Exams Resulted Within Past 24 Hours    Medical Decision Making:  ED Course as of 04/22/22 1555   Thu Apr 21, 2022   1228 EKG     viewed by ER MD prior to my interpretation     EKG time: 1212  Rhythm/Rate: 144, A. fib with RVR  P waves and MI: Not applicable  QRS, axis: Normal QRS, LAD  ST and T waves: Repolarization abnormality likely rate related    Interpreted Contemporaneously by me, independently viewed  Compared with 12/24/2018 patient was 98 A. fib with multiple PVCs noted rate was definitely more controlled on that date.   [EW]   1344 Creatinine(!): 5.36  Patient is end-stage renal disease on dialysis [EW]   1357 I have discussed workup with patient and his son.  A fib is curently controlled.  I will have staff ambulate him with walker to see how he does.  [EW]   1408 WBC(!): 15.55  Restarted prednisone earlier this week [TD]   1408 Troponin T(!!): 0.177 [TD]   1514 Troponin T(!!): 0.177  MDM/dispo:  Pt with CKD which could be elevating his troponin.  pt has no chest pain.  He has been on recent Prednisone which could elevated WBC.   He has dialysis scheduled tomorrow and fluid will be removed which can help his shortness of breath.  Dr. Kaur and I have both discussed with family and offered admission.   The patient wishes to go home which I feel comfortable with.  He has around the clock personal care.  Strict RTER precautions given to patient.  [EW]      ED Course User Index  [EW] Patricia Aguirre APRN  [TD] Star Kaur II, MD       There is no significant evidence of pneumonia on his CT scan.  His white blood cell count is elevated which I believe is due to his recent initiation of prednisone.  He has no chest pain whatsoever.  I believe his elevated troponin is due to his ESRD.     He states that he feels much better now that his rapid ventricular rate has improved.  I think this is the dominant cause of the patient's underlying symptoms.    PPE: Both the patient and I wore a surgical mask throughout the entire patient encounter. I wore protective goggles.     Diagnosis  Final diagnoses:   Shortness of breath   Atrial fibrillation with RVR (HCC)   ESRD on dialysis (HCC)        Star Kaur II, MD  04/22/22 7485

## 2022-05-22 PROBLEM — B34.8 RHINOVIRUS INFECTION: Status: ACTIVE | Noted: 2022-01-01

## 2022-05-22 PROBLEM — J18.9 PNEUMONIA OF BOTH LUNGS DUE TO INFECTIOUS ORGANISM: Status: ACTIVE | Noted: 2022-01-01

## 2022-05-22 PROBLEM — J96.01 ACUTE RESPIRATORY FAILURE WITH HYPOXIA (HCC): Status: ACTIVE | Noted: 2022-01-01

## 2022-05-22 PROBLEM — Z66 DNR (DO NOT RESUSCITATE): Status: ACTIVE | Noted: 2022-01-01

## 2022-05-28 PROBLEM — I21.4 NSTEMI (NON-ST ELEVATED MYOCARDIAL INFARCTION) (HCC): Status: ACTIVE | Noted: 2022-01-01

## 2022-05-28 PROBLEM — I50.22 CHRONIC SYSTOLIC CHF (CONGESTIVE HEART FAILURE) (HCC): Status: ACTIVE | Noted: 2022-01-01

## 2022-06-01 NOTE — NURSING NOTE
At 1725 : pt pulls of Venous needle.  tx ended.  Net UF of 998 removed    B/p 123/45  Hr 60  Temp 98.2

## 2022-06-01 NOTE — PLAN OF CARE
Goal Outcome Evaluation:  Plan of Care Reviewed With: patient        Progress: improving  Outcome Evaluation: Pt able to progress to CGA levels for transfers and gait up to 60ft with FWW. Pt appears primarily limited due to fatigue requring prolonged seated rest break prior to second gait trial of short distance of 15ft to bedside chair. Pt will continue to benefit from acute skilled PT to increase overall strength, balance, endurance, safety and maximize  independence with least restrictive AD in order to return home safely with decrease risk for falls and burden of care. Pt does have 24/7 caregiver support at prior level, pending family/patient goal recommend SNF vs home with continued 24/7care and home health PT   abdominal pain

## 2022-06-01 NOTE — PROGRESS NOTES
[facility] INPATIENT PROGRESS NOTE         [address]    2022      PATIENT IDENTIFICATION:  Name: Shaun Brewster ADMIT: 2022   : 1930  PCP: Andrew Trujillo MD    MRN: [id number] LOS: 10 days   AGE/SEX: 89 y.o. male  ROOM: [facility]                     LOS 10    Reason for visit: Pneumonia with pleural effusions and hypoxemia      SUBJECTIVE:      No new issues.      Objective   OBJECTIVE:    Vital Sign Min/Max for last 24 hours  Temp  Min: 97.1 °F (36.2 °C)  Max: 98.7 °F (37.1 °C)   BP  Min: 75/56  Max: 113/79   Pulse  Min: 71  Max: 92   Resp  Min: 14  Max: 18   SpO2  Min: 96 %  Max: 100 %   No data recorded   Weight  Min: 72.9 kg (160 lb 11.5 oz)  Max: 72.9 kg (160 lb 11.5 oz)                         Body mass index is 21.2 kg/m².    Intake/Output Summary (Last 24 hours) at 2022 0855  Last data filed at 2022 0700  Gross per 24 hour   Intake 0 ml   Output --   Net 0 ml         Exam:  GEN:  No distress, appears stated age  EYES:   anicteric sclerae  ENT:    External ears/nose normal, OP clear   NECK:  No adenopathy, midline trachea  LUNGS: Nonlabored      Assessment     Scheduled meds:  benzonatate, 200 mg, Oral, Nightly  carvedilol, 6.25 mg, Oral, BID With Meals  donepezil, 10 mg, Oral, Nightly  famotidine, 40 mg, Oral, Daily  finasteride, 5 mg, Oral, Daily  guaifenesin, 200 mg, Oral, Nightly  HYDROcodone Bit-Homatrop MBr, 5 mL, Oral, Nightly  lidocaine-prilocaine, 1 application, Topical, Once per day on   polyethylene glycol, 17 g, Oral, Daily  QUEtiapine, 25 mg, Oral, Nightly  rivaroxaban, 15 mg, Oral, Once per day on  Sat  sevelamer, 2,400 mg, Oral, TID With Meals      IV meds:                         Data Review:  Results from last 7 days   Lab Units 22  0558 22  0514 22  0757 22  0828   SODIUM mmol/L 137 139 142 140   POTASSIUM mmol/L 4.3 4.4 4.0 3.7   CHLORIDE mmol/L 96* 99 101 100   CO2 mmol/L 25.7 28.9 26.1 23.8    BUN mg/dL 50* 39* 26* 44*   CREATININE mg/dL 6.99* 5.51* 4.01* 5.50*   GLUCOSE mg/dL 89 97 90 100*   CALCIUM mg/dL 9.7* 9.7* 9.6 9.4         Estimated Creatinine Clearance: 7 mL/min (A) (by C-G formula based on SCr of 6.99 mg/dL (H)).  Results from last 7 days   Lab Units 05/30/22  0558 05/29/22  0514 05/28/22  0758 05/27/22  0828   WBC 10*3/mm3 9.99 9.00 10.27 8.57   HEMOGLOBIN g/dL 8.4* 7.8* 8.6* 8.1*   PLATELETS 10*3/mm3 213 181 176 161                         No results found for: HGBA1C, POCGLU    Chest x-ray 5/22 reviewed              2D echo shows EF 30%          Microbiology reviewed              Active Hospital Problems    Diagnosis  POA   • **Pneumonia of both lungs due to infectious organism [J18.9]  Yes   • Chronic systolic CHF (congestive heart failure) ([facility]) [I50.22]  Yes   • NSTEMI (non-ST elevated myocardial infarction) ([facility]) [I21.4]  Yes   • Acute respiratory failure with hypoxia ([facility]) [J96.01]  Yes   • Rhinovirus infection [B34.8]  Yes   • DNR (do not resuscitate) [Z66]  Yes   • Late onset Alzheimer's disease without behavioral disturbance ([facility]) [G30.1, F02.80]  Yes   • At risk for falls [Z91.81]  Not Applicable   • Hypertension [I10]  Yes   • ESRD (end stage renal disease) ([facility]) [N18.6]  Yes   • Pleural effusion on right [J90]  Yes   • Chronic atrial fibrillation ([facility]) [I48.20]  Yes      Resolved Hospital Problems   No resolved problems to display.         ASSESSMENT:    Acute rhinovirus bronchitis  Aspiration pneumonia  Bilateral pleural effusions right greater than left  Hypoxemia  Sepsis present on presentation  Right greater trochanteric fracture: Cleared for PT per Ortho  Cardiomegaly with systolic heart failure: EF 30%  AMS/Agitation and encephalopathy  Anemia of chronic disease      PLAN:    Completed antibiotics for aspiration pneumonia.  Encourage pulmonary toilet.  Elevate head of the bed for aspiration precautions.  Treatment for rhinovirus is supportive.  Bronchodilator prn for  wheezing.  DNR.  CCP working on discharge.  Following peripherally for pulmonary issues and no objection to discharge to skilled nursing unit when available.      Henrique Prince MD  Pulmonary and Critical Care Medicine  [facility]  6/1/2022    08:55 EDT

## 2022-06-01 NOTE — PROGRESS NOTES
Continued Stay Note  [facility]     Patient Name: Shaun Brewster  MRN: [id number]  Today's Date: 6/1/2022    Admit Date: 5/22/2022     Discharge Plan     Row Name 06/01/22 1023       Plan    Plan SNF referrals pending    Plan Comments Spoke with pt's son at bedside this morning to review denials and reasons for SNF's with in house dialysis.  Pt's son agreeable to referral to [facility] to assess.  Spoke with Kaitlynn at [facility] who stated tshe will review to see if pt may meet for Acute level of rehab and they do have dialysis on site.  Also discussed with pt's son referral to additional SNF with family paying privately for transportation to and from dialysis.  Pt's son agreeable.  Referrals made to [facility] and [facility]. Discussed with liasion pt's family wanting to to use sitters from home at bedside while at SNF since pt is use to having them with him at home.  [facility] also did call [facility] to get an estamated transport cost for family. Roughly wheelchair van transport would be  $74 per way and stretcher van would be $215 per way.   [facility] will continue to work on pt's DCP.               Discharge Codes    No documentation.               Expected Discharge Date and Time     Expected Discharge Date Expected Discharge Time    Amari 3, 2022             JESSICA Blank

## 2022-06-01 NOTE — PROGRESS NOTES
[facility] Progress Note      Patient Name: Shaun Brewster  : 1930  MRN: [id number]  Primary Care Physician:  Andrew Trujillo MD  Date of admission: 2022    Subjective     Interval History:   Follow up ESRD.     Patient has alzheimer's dementia. He denies any complaints today.  No new issues.  He slept well overnight.  Son is at bedside.  Had dialysis yesterday with 1 L fluid removal.  Review of Systems:   As noted above    Objective     Vitals:   Temp:  [97.1 °F (36.2 °C)-98.7 °F (37.1 °C)] 97.6 °F (36.4 °C)  Heart Rate:  [71-92] 77  Resp:  [14-18] 18  BP: ()/(56-82) 113/79    Intake/Output Summary (Last 24 hours) at 2022 1109  Last data filed at 2022 0700  Gross per 24 hour   Intake 0 ml   Output --   Net 0 ml       Physical Exam:    General Appearance: Awake and alert, no acute distress, appears to be chronically ill  Skin: warm and dry  HEENT: oral mucosa normal, nonicteric sclera  Neck: supple, no JVD  Lungs: Clear to auscultation, unlabored breathing effort  Heart: Irregularly irregular, no rub  Abdomen: soft, nontender, nondistended.  Normoactive bowel  Extremities: trace lower ext edema . LUE AVF patent.  Neuro: normal speech . Not oriented .      Scheduled Meds:     benzonatate, 200 mg, Oral, Nightly  carvedilol, 6.25 mg, Oral, BID With Meals  donepezil, 10 mg, Oral, Nightly  famotidine, 40 mg, Oral, Daily  finasteride, 5 mg, Oral, Daily  guaifenesin, 200 mg, Oral, Nightly  HYDROcodone Bit-Homatrop MBr, 5 mL, Oral, Nightly  lidocaine-prilocaine, 1 application, Topical, Once per day on   polyethylene glycol, 17 g, Oral, Daily  QUEtiapine, 25 mg, Oral, Nightly  rivaroxaban, 15 mg, Oral, Once per day on  Sat  sevelamer, 2,400 mg, Oral, TID With Meals      IV Meds:        Results Reviewed:   I have personally reviewed the results from the time of this admission to 2022 11:09 EDT     Results from last 7 days   Lab Units  05/30/22 0558 05/29/22  0514 05/28/22  0757   SODIUM mmol/L 137 139 142   POTASSIUM mmol/L 4.3 4.4 4.0   CHLORIDE mmol/L 96* 99 101   CO2 mmol/L 25.7 28.9 26.1   BUN mg/dL 50* 39* 26*   CREATININE mg/dL 6.99* 5.51* 4.01*   CALCIUM mg/dL 9.7* 9.7* 9.6   GLUCOSE mg/dL 89 97 90       Estimated Creatinine Clearance: 7 mL/min (A) (by C-G formula based on SCr of 6.99 mg/dL (H)).    Results from last 7 days   Lab Units 05/30/22 0558 05/29/22 0514 05/28/22  0757   PHOSPHORUS mg/dL 5.8* 5.3* 4.5             Results from last 7 days   Lab Units 05/30/22 0558 05/29/22 0514 05/28/22  0758 05/27/22  0828   WBC 10*3/mm3 9.99 9.00 10.27 8.57   HEMOGLOBIN g/dL 8.4* 7.8* 8.6* 8.1*   PLATELETS 10*3/mm3 213 181 176 161             Assessment / Plan     ASSESSMENT:  1. ESRD. Last dialysis friday without any difficulties, appears to be euvolemic and electrolyte within acceptable range.   2. Acute rhinovirus bronchitis, aspiration PNA.   3. Right greater trochanter fracture in past with hip screw in place.   4. Anemia CKD.  Hemoglobin 7.8 today, treated with long-acting MINESH as an outpatient  5. NSTEMI type II. Followed by cardiology  6. Alzheimer's Dementia    PLAN:    1.  Will continue regular schedule of MWF dialysis treatments.   2. Patient is stable from a renal standpoint to proceed with discharge to rehab facility. Would continue to follow patient in outpatient dialysis setting.   2.  Surveillance lab    I discussed the case with the patient's son at the bedside  Radha Harman MD  06/01/22  11:09 EDT    [facility]  [phone]

## 2022-06-01 NOTE — PROGRESS NOTES
Name: Shaun Brewster ADMIT: 2022   : 1930  PCP: Andrew Trujillo MD    MRN: [id number] LOS: 10 days   AGE/SEX: 89 y.o. male  ROOM: [facility]     Subjective   Subjective   Patient is lying in the bed and is in no major distress.  No new events overnight.  Denies nausea, vomiting, abdominal pain, chest pain.     Objective   Objective   Vital Signs  Temp:  [97.1 °F (36.2 °C)-98.7 °F (37.1 °C)] 97.6 °F (36.4 °C)  Heart Rate:  [71-92] 77  Resp:  [14-18] 18  BP: ()/(56-82) 113/79  SpO2:  [96 %-100 %] 99 %  on   ;   Device (Oxygen Therapy): room air  Body mass index is 21.2 kg/m².  Physical Exam  Constitutional:       General: He is not in acute distress.     Appearance: He is not diaphoretic.      Comments: Frail   HENT:      Head: Normocephalic and atraumatic.      Nose: Nose normal.      Mouth/Throat:      Mouth: Mucous membranes are moist.   Eyes:      Extraocular Movements: Extraocular movements intact.      Pupils: Pupils are equal, round, and reactive to light.   Cardiovascular:      Rate and Rhythm: Normal rate and regular rhythm.   Pulmonary:      Effort: Pulmonary effort is normal. No respiratory distress.      Breath sounds: No wheezing or rhonchi.   Abdominal:      Palpations: Abdomen is soft.      Tenderness: There is no abdominal tenderness. There is no guarding.   Musculoskeletal:      Cervical back: Normal range of motion and neck supple.      Right lower leg: No edema.      Left lower leg: No edema.   Skin:     General: Skin is warm and dry.   Neurological:      General: No focal deficit present.      Mental Status: He is alert and oriented to person, place, and time.      Comments: Pleasantly confused         Results Review     I reviewed the patient's new clinical results.  Results from last 7 days   Lab Units 22  0558 22  0514 22  0758 22  0828   WBC 10*3/mm3 9.99 9.00 10.27 8.57   HEMOGLOBIN g/dL 8.4* 7.8* 8.6* 8.1*   PLATELETS 10*3/mm3 213 181 176 161      Results from last 7 days   Lab Units 05/30/22  0558 05/29/22  0514 05/28/22  0757 05/27/22  0828   SODIUM mmol/L 137 139 142 140   POTASSIUM mmol/L 4.3 4.4 4.0 3.7   CHLORIDE mmol/L 96* 99 101 100   CO2 mmol/L 25.7 28.9 26.1 23.8   BUN mg/dL 50* 39* 26* 44*   CREATININE mg/dL 6.99* 5.51* 4.01* 5.50*   GLUCOSE mg/dL 89 97 90 100*   Estimated Creatinine Clearance: 7 mL/min (A) (by C-G formula based on SCr of 6.99 mg/dL (H)).  Results from last 7 days   Lab Units 05/30/22  0558 05/29/22  0514 05/28/22 0757 05/27/22  0828   ALBUMIN g/dL 2.90* 2.90* 3.40* 3.10*     Results from last 7 days   Lab Units 05/30/22  0558 05/29/22  0514 05/28/22 0757 05/27/22  0828   CALCIUM mg/dL 9.7* 9.7* 9.6 9.4   ALBUMIN g/dL 2.90* 2.90* 3.40* 3.10*   PHOSPHORUS mg/dL 5.8* 5.3* 4.5 6.4*         COVID19   Date Value Ref Range Status   05/22/2022 Not Detected Not Detected - Ref. Range Final   04/21/2022 Not Detected Not Detected - Ref. Range Final     No results found for: HGBA1C, POCGLU    Adult Transthoracic Echo Complete W/ Cont if Necessary Per Protocol  · Left ventricular wall thickness is consistent with moderate concentric   hypertrophy.  · Estimated left ventricular EF = 30% Left ventricular systolic function   is moderately decreased.  · Left ventricular diastolic function was indeterminate.  · Moderately reduced right ventricular systolic function noted.  · The right atrial cavity is severely dilated.  · Mild aortic valve stenosis is present.  · Peak velocity of the flow distal to the aortic valve is 200.9 cm/s.  · Moderate mitral valve regurgitation is present.  · Estimated right ventricular systolic pressure from tricuspid   regurgitation is normal (<35 mmHg).       I reviewed the patient's daily medications.  Scheduled Medications  benzonatate, 200 mg, Oral, Nightly  carvedilol, 6.25 mg, Oral, BID With Meals  donepezil, 10 mg, Oral, Nightly  famotidine, 40 mg, Oral, Daily  finasteride, 5 mg, Oral, Daily  guaifenesin, 200  mg, Oral, Nightly  HYDROcodone Bit-Homatrop MBr, 5 mL, Oral, Nightly  lidocaine-prilocaine, 1 application, Topical, Once per day on Mon Wed Fri  polyethylene glycol, 17 g, Oral, Daily  QUEtiapine, 25 mg, Oral, Nightly  rivaroxaban, 15 mg, Oral, Once per day on Mon Wed Fri Sat  sevelamer, 2,400 mg, Oral, TID With Meals    Infusions   Diet  Diet Dysphagia; IV - Mechanical Soft No Mixed Consistencies; Nectar / Syrup Thick       Assessment/Plan     Active Hospital Problems    Diagnosis  POA   • **Pneumonia of both lungs due to infectious organism [J18.9]  Yes   • Chronic systolic CHF (congestive heart failure) ([facility]) [I50.22]  Yes   • NSTEMI (non-ST elevated myocardial infarction) ([facility]) [I21.4]  Yes   • Acute respiratory failure with hypoxia ([facility]) [J96.01]  Yes   • Rhinovirus infection [B34.8]  Yes   • DNR (do not resuscitate) [Z66]  Yes   • Late onset Alzheimer's disease without behavioral disturbance ([facility]) [G30.1, F02.80]  Yes   • At risk for falls [Z91.81]  Not Applicable   • Hypertension [I10]  Yes   • ESRD (end stage renal disease) ([facility]) [N18.6]  Yes   • Pleural effusion on right [J90]  Yes   • Chronic atrial fibrillation ([facility]) [I48.20]  Yes      Resolved Hospital Problems   No resolved problems to display.       89 y.o. male with history of Alzheimer's dementia, A. Fib, heart failure and ESRD who presented with worsening cough.  He was found to have rhinovirus bronchitis and aspiration pneumonia.  Hospital course was complicated by type II NSTEMI and right trochanteric fracture.      Acute Rhinovirus Bronchitis with Superimposed Bacterial Pneumonia, likely aspiration pneumonia:  -Aspiration pneumonia has been treated with 5 days of antibiotics  -Sleep therapy did evaluate and recommended nectar thick liquids which patient does not like and son and daughter are aware of the same and they are okay with him being on thin liquids.  They all verbalized risk of aspiration which could be fatal.     ESRD  - continue  "hemodialysis per nephrology.  He is due for dialysis today.     Type II NSTEMI  -Troponins peaked at 0.27 and were flat.  No chest pain.  Evaluated by cardiology who recommended against invasive testing at this time due to his age and comorbidities.  Family is in agreement.  -Currently on Coreg and Xarelto.    Right Trochanteric Fracture  -Evaluated by orthopedics, no need for surgical intervention.  Hardware from previous hip fracture repair will \"protect from his current fracture\".  Okay for weightbearing as tolerated.  Pain is reasonably well controlled.    Heart failure with decreased ejection fraction (30%)  -Currently appears euvolemic.  Continue beta-blocker.  Not on ACE/ARB as blood pressure would not tolerate     Chronic Afib  - cardizem changed to carvedilol due to decreased ejection fraction  -Xarelto dosed after dialysis     Alzheimer's Dementia  - continue aricept  -Delirium precautions      · Xarelto (home med) for DVT prophylaxis.  · DNR/DNI  · Discussed with patient, family and nursing staff.  · Anticipate discharge to SNU facility once arrangements have been made.  He is medically ready for discharge when an accepting facility is available      Richar Escudero MD  [facility]  06/01/22  12:55 EDT    Patient was placed in face mask on first look.  I wore protective equipment throughout this patient encounter including a face mask, gloves and protective eyewear.  Hand hygiene was performed before donning protective equipment and after removal when leaving the room.        "

## 2022-06-01 NOTE — THERAPY PROGRESS REPORT/RE-CERT
Patient Name: Shaun Brewster  : 1930    MRN: [id number]                              Today's Date: 2022       Admit Date: 2022    Visit Dx:     ICD-10-CM ICD-9-CM   1. Pneumonia of both lungs due to infectious organism, unspecified part of lung  J18.9 483.8   2. Closed nondisplaced fracture of greater trochanter of right femur, initial encounter ([facility])  S72.114A 820.20   3. ESRD (end stage renal disease) ([facility])  N18.6 585.6     Patient Active Problem List   Diagnosis   • Ventricular tachycardia ([facility])   • Chronic atrial fibrillation ([facility])   • Hypertension   • ESRD (end stage renal disease) ([facility])   • Closed fracture of multiple ribs of right side   • Pleural effusion on right   • Kidney cysts   • Liver cyst   • Acute biliary pancreatitis without infection or necrosis   • Late onset Alzheimer's disease without behavioral disturbance ([facility])   • At risk for falls   • Pneumonia of both lungs due to infectious organism   • Acute respiratory failure with hypoxia ([facility])   • Rhinovirus infection   • DNR (do not resuscitate)   • Chronic systolic CHF (congestive heart failure) ([facility])   • NSTEMI (non-ST elevated myocardial infarction) ([facility])     Past Medical History:   Diagnosis Date   • Anemia    • 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[facility])    • Arthritis    • Atypical chest pain    • Blind right eye    • Chronic kidney disease (CKD), stage V ([facility])    • Difficulty walking    • Dizziness    • Dyslipidemia    • Esophageal reflux    • Esophagitis, reflux    • Femur fracture, right ([facility])    • HL (hearing loss)    • Hypertension    • Macular degeneration    • Malignant neoplasm of prostate ([facility])     gland   • Memory loss    • Nephrolithiasis    • Nonspecific abnormal finding    • Paroxysmal atrial fibrillation ([facility])    • Postnasal drip    • Premature ventricular contractions    • Renal disease    • Throat pain    • Urge incontinence of urine    • Ventricular tachycardia ([facility])      Past Surgical History:   Procedure Laterality Date   •  ARTERIOVENOUS FISTULA Left 2015   • HAND SURGERY Bilateral    • HIP SURGERY     • INGUINAL HERNIA REPAIR     • KNEE SURGERY     • OTHER SURGICAL HISTORY      cardiac cath procedure summary normal, corneal lasik   • REPLACEMENT TOTAL KNEE      left    • SHOULDER SURGERY     • SKIN CANCER EXCISION     • TONSILLECTOMY AND ADENOIDECTOMY        General Information     Row Name 06/01/22 0656          Physical Therapy Time and Intention    Document Type progress note/recertification  -AG     Mode of Treatment individual therapy;physical therapy  -AG     Row Name 06/01/22 1330          General Information    Patient Profile Reviewed yes  -AG     Existing Precautions/Restrictions fall  -AG           User Key  (r) = Recorded By, (t) = Taken By, (c) = Cosigned By    Initials Name Provider Type    AG Patricia Almodovar, PT Physical Therapist               Mobility     Row Name 06/01/22 5304          Bed Mobility    Bed Mobility supine-sit  -AG     Supine-Sit [place] (Bed Mobility) supervision;verbal cues  -AG     Assistive Device (Bed Mobility) head of bed elevated;bed rails  -AG     Row Name 06/01/22 1335          Bed-Chair Transfer    Bed-Chair [place] (Transfers) verbal cues;contact guard  -AG     Assistive Device (Bed-Chair Transfers) walker, front-wheeled  -AG     Row Name 06/01/22 1331          Sit-Stand Transfer    Sit-Stand [place] (Transfers) verbal cues;contact guard  -AG     Assistive Device (Sit-Stand Transfers) walker, front-wheeled  -AG     Comment, (Sit-Stand Transfer) cuing on hand placement, CGA to complete sit<>stand 3x throughout session. Cuing for upright posture in standing prior to gait with poor carryover, pt unable to maintain  -AG     Row Name 06/01/22 7017          Gait/Stairs (Locomotion)    [place] Level (Gait) verbal cues;contact guard  -AG     Assistive Device (Gait) walker, front-wheeled  -AG     Distance in Feet (Gait) 60ftx1, 15ftx1  -AG     Deviations/Abnormal Patterns (Gait)  antalgic;base of support, narrow;keagan decreased;gait speed decreased;stride length decreased  -AG     Bilateral Gait Deviations forward flexed posture  -AG     Comment, (Gait/Stairs) frequent cuing on upright head/trunk posture with poor carryover, pt unable to maintain. Pt significantly fatigued after first gait trial requring prolonged seated rest break prior to second gait trial to bedside chair due to fatigue.  -AG     Row Name 06/01/22 1339          Mobility    Extremity Weight-bearing Status right lower extremity  -AG     Right Lower Extremity (Weight-bearing Status) weight-bearing as tolerated (WBAT)  -AG           User Key  (r) = Recorded By, (t) = Taken By, (c) = Cosigned By    Initials Name Provider Type    AG Patricia Almodovar, PT Physical Therapist               Obj/Interventions    No documentation.                Goals/Plan     Row Name 06/01/22 1345          Bed Mobility Goal 1 (PT)    Strategies/Barriers (Bed Mobility Goal 1, PT) needs consistency prior to goal met  -AG     Progress/Outcomes (Bed Mobility Goal 1, PT) goal ongoing  -AG     Row Name 06/01/22 8705          Transfer Goal 1 (PT)    Activity/Assistive Device (Transfer Goal 1, PT) sit-to-stand/stand-to-sit;bed-to-chair/chair-to-bed;walker, rolling  -AG     [place] Level/Cues Needed (Transfer Goal 1, PT) minimum assist (75% or more patient effort)  -AG     Progress/Outcome (Transfer Goal 1, PT) goal met  -AG     Row Name 06/01/22 5115          Gait Training Goal 1 (PT)    Activity/Assistive Device (Gait Training Goal 1, PT) gait (walking locomotion);walker, rolling  -AG     [place] Level (Gait Training Goal 1, PT) minimum assist (75% or more patient effort)  -AG     Distance (Gait Training Goal 1, PT) 50'  -AG     Time Frame (Gait Training Goal 1, PT) 1 week  -AG     Progress/Outcome (Gait Training Goal 1, PT) goal met  -AG     Row Name 06/01/22 1305          Problem Specific Goal 1 (PT)    Problem Specific Goal 1 (PT) Pt to  ambulate 80ft with supervision with least restrictive AD  -AG           User Key  (r) = Recorded By, (t) = Taken By, (c) = Cosigned By    Initials Name Provider Type    AG Patricia Almodovar PT Physical Therapist               Clinical Impression     Row Name 06/01/22 1342          Pain Scale: FACES Pre/Post-Treatment    Pre/Posttreatment Pain Comment pt positioned to comfort at end of session, no pain reports throughouts ession  -AG     Row Name 06/01/22 1342          Plan of Care Review    Plan of Care Reviewed With patient  -AG     Progress improving  -AG     Outcome Evaluation Pt able to progress to CGA levels for transfers and gait up to 60ft with FWW. Pt appears primarily limited due to fatigue requring prolonged seated rest break prior to second gait trial of short distance of 15ft to bedside chair. Pt will continue to benefit from acute skilled PT to increase overall strength, balance, endurance, safety and maximize  independence with least restrictive AD in order to return home safely with decrease risk for falls and burden of care. Pt does have 24/7 caregiver support at prior level, pending family/patient goal recommend SNF vs home with continued 24/7care and home health PT  -AG     Row Name 06/01/22 1342          Therapy Assessment/Plan (PT)    Rehab Potential (PT) fair, will monitor progress closely  -     Criteria for Skilled Interventions Met (PT) yes  -AG     Therapy Frequency (PT) 5 times/wk  -AG     Row Name 06/01/22 1342          Vital Signs    Pre Patient Position Supine  -AG     Post Patient Position Sitting  -AG     Row Name 06/01/22 1342          Positioning and Restraints    Pre-Treatment Position in bed  -AG     Post Treatment Position chair  -AG     In Chair call light within reach;encouraged to call for assist;exit alarm on;with family/caregiver  -AG           User Key  (r) = Recorded By, (t) = Taken By, (c) = Cosigned By    Initials Name Provider Type    AG Patricia Almodovar, PT Physical  Therapist               Outcome Measures     Row Name 06/01/22 1347          How much help from another person do you currently need...    Turning from your back to your side while in flat bed without using bedrails? 3  -AG     Moving from lying on back to sitting on the side of a flat bed without bedrails? 3  -AG     Moving to and from a bed to a chair (including a wheelchair)? 3  -AG     Standing up from a chair using your arms (e.g., wheelchair, bedside chair)? 3  -AG     Climbing 3-5 steps with a railing? 1  -AG     To walk in hospital room? 3  -AG     AM-PAC 6 Clicks Score (PT) 16  -AG     Highest level of mobility 5 --> Static standing  -AG     Row Name 06/01/22 1347          Functional Assessment    Outcome Measure Options AM-PAC 6 Clicks Basic Mobility (PT)  -AG           User Key  (r) = Recorded By, (t) = Taken By, (c) = Cosigned By    Initials Name Provider Type    AG Patricia Almodovar, PT Physical Therapist                             Physical Therapy Education                 Title: PT OT SLP Therapies (Done)     Topic: Physical Therapy (Done)     Point: Mobility training (Done)     Learning Progress Summary           Patient Acceptance, E,TB, VU,NR by RW at 5/31/2022 1509    Acceptance, E,D, VU,NR by EB at 5/30/2022 1302    Acceptance, E,TB,D, VU,NR by EB at 5/26/2022 1635    Acceptance, E, VU,NR by EM at 5/25/2022 1312    Acceptance, E,TB, VU,NR by EE at 5/24/2022 1118   Family Acceptance, E,TB, VU,NR by RW at 5/31/2022 1509    Acceptance, E, VU,NR by EM at 5/25/2022 1312                   Point: Home exercise program (Done)     Learning Progress Summary           Patient Acceptance, E,TB, VU,NR by RW at 5/31/2022 1509    Acceptance, E,D, VU,NR by EB at 5/30/2022 1302    Acceptance, E,TB,D, VU,NR by EB at 5/26/2022 1635    Acceptance, E,TB, VU,NR by EE at 5/24/2022 1118   Family Acceptance, E,TB, VU,NR by RW at 5/31/2022 1509                   Point: Body mechanics (Done)     Learning Progress Summary            Patient Acceptance, E,TB, VU,NR by RW at 5/31/2022 1509    Acceptance, E,D, VU,NR by EB at 5/30/2022 1302    Acceptance, E,TB,D, VU,NR by EB at 5/26/2022 1635    Acceptance, E,TB, VU,NR by  at 5/24/2022 1118   Family Acceptance, E,TB, VU,NR by RW at 5/31/2022 1509                   Point: Precautions (Done)     Learning Progress Summary           Patient Acceptance, E,TB, VU,NR by RW at 5/31/2022 1509    Acceptance, E,D, VU,NR by EB at 5/30/2022 1302    Acceptance, E,TB,D, VU,NR by EB at 5/26/2022 1635   Family Acceptance, E,TB, VU,NR by RW at 5/31/2022 1509                               User Key     Initials Effective Dates Name Provider Type Discipline     06/16/21 -  Patricia Frias, PT Physical Therapist PT     06/16/21 -  Danica Kinsey PT Physical Therapist PT     06/16/21 -  Chandler Loving, KAMRAN Registered Nurse Nurse     06/16/21 -  Shanta Gupta PTA Physical Therapist Assistant PT              PT Recommendation and Plan     Plan of Care Reviewed With: patient  Progress: improving  Outcome Evaluation: Pt able to progress to CGA levels for transfers and gait up to 60ft with FWW. Pt appears primarily limited due to fatigue requring prolonged seated rest break prior to second gait trial of short distance of 15ft to bedside chair. Pt will continue to benefit from acute skilled PT to increase overall strength, balance, endurance, safety and maximize  independence with least restrictive AD in order to return home safely with decrease risk for falls and burden of care. Pt does have 24/7 caregiver support at prior level, pending family/patient goal recommend SNF vs home with continued 24/7care and home health PT     Time Calculation:    PT Charges     Row Name 06/01/22 9517             Time Calculation    Start Time 0911  -AG      Stop Time 0928  -AG      Time Calculation (min) 17 min  -AG      PT Received On 06/01/22  -AG      PT - Next Appointment 06/02/22  -AG              Time Calculation- PT     Total Timed Code Minutes- PT 17 minute(s)  -AG              Timed Charges    08553 - Gait Training Minutes  17  -AG              Total Minutes    Timed Charges Total Minutes 17  -AG       Total Minutes 17  -AG            User Key  (r) = Recorded By, (t) = Taken By, (c) = Cosigned By    Initials Name Provider Type    Patricia Antony, PT Physical Therapist              Therapy Charges for Today     Code Description Service Date Service Provider Modifiers Qty    29160630207 HC GAIT TRAINING EA 15 MIN 6/1/2022 Patricia Almodovar, PT GP 1          PT G-Codes  Outcome Measure Options: AM-PAC 6 Clicks Basic Mobility (PT)  AM-PAC 6 Clicks Score (PT): 16    Patricia Almodovar PT  6/1/2022

## 2022-06-01 NOTE — PLAN OF CARE
Goal Outcome Evaluation:  Plan of Care Reviewed With: patient        Progress: no change  Outcome Evaluation: Resting  in  bed.  No  complaints  voiced.   Afib  on the  monitor.   Nursing  will  continue to monitor.

## 2022-06-02 NOTE — PLAN OF CARE
Goal Outcome Evaluation:  Plan of Care Reviewed With: patient, family           Outcome Evaluation: Dialysis orders called and will have tomorrow before DC to Rehab.  VSS.  Tranport via family to [place] if all goes well.  Afib on the monitor.

## 2022-06-02 NOTE — PROGRESS NOTES
[facility] INPATIENT PROGRESS NOTE         [address]    2022      PATIENT IDENTIFICATION:  Name: Shaun Brewster ADMIT: 2022   : 1930  PCP: Andrew Trujillo MD    MRN: [id number] LOS: 11 days   AGE/SEX: 89 y.o. male  ROOM: [facility]                     LOS 11    Reason for visit: Pneumonia with pleural effusions and hypoxemia      SUBJECTIVE:      No new issues.      Objective   OBJECTIVE:    Vital Sign Min/Max for last 24 hours  Temp  Min: 97.1 °F (36.2 °C)  Max: 98.1 °F (36.7 °C)   BP  Min: 102/81  Max: 115/57   Pulse  Min: 76  Max: 89   Resp  Min: 18  Max: 18   SpO2  Min: 90 %  Max: 99 %   No data recorded   Weight  Min: 72.4 kg (159 lb 9.8 oz)  Max: 72.4 kg (159 lb 9.8 oz)                         Body mass index is 21.06 kg/m².    Intake/Output Summary (Last 24 hours) at 2022 1000  Last data filed at 2022  Gross per 24 hour   Intake 60 ml   Output --   Net 60 ml         Exam:  GEN:  No distress, appears stated age  EYES:   anicteric sclerae  ENT:    External ears/nose normal, OP clear   NECK:  No adenopathy, midline trachea  LUNGS: Nonlabored      Assessment     Scheduled meds:  benzonatate, 200 mg, Oral, Nightly  carvedilol, 6.25 mg, Oral, BID With Meals  donepezil, 10 mg, Oral, Nightly  famotidine, 40 mg, Oral, Daily  finasteride, 5 mg, Oral, Daily  guaifenesin, 200 mg, Oral, Nightly  HYDROcodone Bit-Homatrop MBr, 5 mL, Oral, Nightly  lidocaine-prilocaine, 1 application, Topical, Once per day on   polyethylene glycol, 17 g, Oral, Daily  QUEtiapine, 25 mg, Oral, Nightly  rivaroxaban, 15 mg, Oral, Once per day on  Sat  sevelamer, 2,400 mg, Oral, TID With Meals      IV meds:                         Data Review:  Results from last 7 days   Lab Units 22  0641 22  0558 22  0514 22  0757 22  0828   SODIUM mmol/L 136 137 139 142 140   POTASSIUM mmol/L 5.5* 4.3 4.4 4.0 3.7   CHLORIDE mmol/L 99 96* 99 101 100   CO2  mmol/L 28.6 25.7 28.9 26.1 23.8   BUN mg/dL 25* 50* 39* 26* 44*   CREATININE mg/dL 4.54* 6.99* 5.51* 4.01* 5.50*   GLUCOSE mg/dL 92 89 97 90 100*   CALCIUM mg/dL 9.4 9.7* 9.7* 9.6 9.4         Estimated Creatinine Clearance: 10.6 mL/min (A) (by C-G formula based on SCr of 4.54 mg/dL (H)).  Results from last 7 days   Lab Units 06/02/22  0641 05/30/22  0558 05/29/22  0514 05/28/22  0758 05/27/22  0828   WBC 10*3/mm3 8.60 9.99 9.00 10.27 8.57   HEMOGLOBIN g/dL 7.8* 8.4* 7.8* 8.6* 8.1*   PLATELETS 10*3/mm3 186 213 181 176 161                         No results found for: HGBA1C, POCGLU    Chest x-ray 5/22 reviewed              2D echo shows EF 30%          Microbiology reviewed              Active Hospital Problems    Diagnosis  POA   • **Pneumonia of both lungs due to infectious organism [J18.9]  Yes   • Chronic systolic CHF (congestive heart failure) ([facility]) [I50.22]  Yes   • NSTEMI (non-ST elevated myocardial infarction) ([facility]) [I21.4]  Yes   • Acute respiratory failure with hypoxia ([facility]) [J96.01]  Yes   • Rhinovirus infection [B34.8]  Yes   • DNR (do not resuscitate) [Z66]  Yes   • Late onset Alzheimer's disease without behavioral disturbance ([facility]) [G30.1, F02.80]  Yes   • At risk for falls [Z91.81]  Not Applicable   • Hypertension [I10]  Yes   • ESRD (end stage renal disease) ([facility]) [N18.6]  Yes   • Pleural effusion on right [J90]  Yes   • Chronic atrial fibrillation ([facility]) [I48.20]  Yes      Resolved Hospital Problems   No resolved problems to display.         ASSESSMENT:    Acute rhinovirus bronchitis  Aspiration pneumonia  Bilateral pleural effusions right greater than left  Hypoxemia  Sepsis present on presentation  Right greater trochanteric fracture: Cleared for PT per Ortho  Cardiomegaly with systolic heart failure: EF 30%  AMS/Agitation and encephalopathy  Anemia of chronic disease      PLAN:    Completed antibiotics for aspiration pneumonia.  Encourage pulmonary toilet.  Elevate head of the bed for aspiration  precautions.  Treatment for rhinovirus is supportive.  Bronchodilator prn for wheezing.  DNR.  CCP working on discharge.  Following peripherally for pulmonary issues and no objection to discharge to skilled nursing unit when available.      Henrique Prince MD  Pulmonary and Critical Care Medicine  [facility], [facility]  6/2/2022    10:00 EDT

## 2022-06-02 NOTE — CASE MANAGEMENT/SOCIAL WORK
Continued Stay Note  [facility]     Patient Name: Shaun Brewster  MRN: [id number]  Today's Date: 6/2/2022    Admit Date: 5/22/2022     Discharge Plan     Row Name 06/02/22 1421       Plan    Plan Plan skilled care at accepting facility [facility].   LIV Mary RN    Plan Comments Spoke with pt's daughter (Audra Trujillo  [phone]) at bedside. Daughter stating her preference is a facility in [place].     Called Chandana  ( [phone]) and she states no [facility] facility has a bed and can accept pt. Per Osmar  ( [phone]) called for update on [facility] facilities- awaiting call back.  Per ( [phone] she has no bed available to accept pt at any of the [facility],  Clayton  ( [phone]) called regarding bed availability at [place]. Awaiting a call back.  Per Kaitlynn  ( [phone]) [facility] can accept pt pending bed availability.   Plan skilled care at accepting facility or [facility].   LIV Mary RN               Discharge Codes    No documentation.               Expected Discharge Date and Time     Expected Discharge Date Expected Discharge Time    Amari 3, 2022             Nichol Mary, RN

## 2022-06-02 NOTE — CASE MANAGEMENT/SOCIAL WORK
Continued Stay Note  [facility]     Patient Name: Shaun Brewster  MRN: [id number]  Today's Date: 6/2/2022    Admit Date: 5/22/2022     Discharge Plan     Row Name 06/02/22 1505       Plan    Plan Plan [facility] for skilled care.   LIV Mary RN    Plan Comments Spoke with pt's daughter (Audra Trujillo [phone]) at bedside. Per Clayton  ( [phone]) pt can be accepted at [facility].  Pt's family will transport or arrange transportation  to get pt to HD at [facility] on [address].  Called and spoke with Preeti  ( [phone]) at [facility] to confirm pt still has his HD chair on M-W-F at 10:25.  She confirmed pt has his HD chair.  Pt's daughter was provided a copy of transportation resources from [facility].  Plan [facility] for skilled care.  LIV Mary RN    Row Name 06/02/22 2610       Plan    Plan Plan skilled care at accepting facility or [facility].   LIV Mary RN    Plan Comments Spoke with pt's daughter (Audra Trujillo  [phone]) at bedside. Daughter stating her preference is a facility in [place].     Called Chandana  ( [phone]) and she states no [facility] facility has a bed and can accept pt. Per Osmar  ( [phone]) called for update on [facility] facilities- awaiting call back.  Per ( [phone]) she has no bed available to accept pt at any of the [facility] facilities,  Clayton  ( [phone]) called regarding bed availability at [facility]. Awaiting a call back.  Per Kaitlynn  ( [phone]) [facility] can accept pt pending bed availability.   Plan skilled care at accepting facility or [facility].   LIV Mary RN               Discharge Codes    No documentation.               Expected Discharge Date and Time     Expected Discharge Date Expected Discharge Time    Amari 3, 2022             Nichol Mary RN

## 2022-06-02 NOTE — PROGRESS NOTES
[facility] Progress Note      Patient Name: Shaun Brewster  : 1930  MRN: [id number]  Primary Care Physician:  Andrew Trujillo MD  Date of admission: 2022    Subjective     Interval History:   Follow up ESRD.     Patient has alzheimer's dementia. He denies any complaints today.  No new issues.    Review of Systems:   As noted above    Objective     Vitals:   Temp:  [97.1 °F (36.2 °C)-98.1 °F (36.7 °C)] 97.3 °F (36.3 °C)  Heart Rate:  [76-89] 76  Resp:  [18] 18  BP: (102-115)/(50-81) 102/81    Intake/Output Summary (Last 24 hours) at 2022 1124  Last data filed at 2022  Gross per 24 hour   Intake 60 ml   Output --   Net 60 ml       Physical Exam:    General Appearance: Awake and alert, no acute distress, appears to be chronically ill  Skin: warm and dry  HEENT: oral mucosa normal, nonicteric sclera  Neck: supple, no JVD  Lungs: Clear to auscultation, unlabored breathing effort  Heart: Irregularly irregular, no rub  Abdomen: soft, nontender, nondistended.  Normoactive bowel  Extremities: trace lower ext edema . LUE AVF patent.  Neuro: normal speech . Not oriented .      Scheduled Meds:     benzonatate, 200 mg, Oral, Nightly  carvedilol, 6.25 mg, Oral, BID With Meals  donepezil, 10 mg, Oral, Nightly  famotidine, 40 mg, Oral, Daily  finasteride, 5 mg, Oral, Daily  guaifenesin, 200 mg, Oral, Nightly  HYDROcodone Bit-Homatrop MBr, 5 mL, Oral, Nightly  lidocaine-prilocaine, 1 application, Topical, Once per day on   polyethylene glycol, 17 g, Oral, Daily  QUEtiapine, 25 mg, Oral, Nightly  rivaroxaban, 15 mg, Oral, Once per day on  Sat  sevelamer, 2,400 mg, Oral, TID With Meals      IV Meds:        Results Reviewed:   I have personally reviewed the results from the time of this admission to 2022 11:24 EDT     Results from last 7 days   Lab Units 22  0641 22  0558 22  0514   SODIUM mmol/L 136 137 139   POTASSIUM mmol/L  5.5* 4.3 4.4   CHLORIDE mmol/L 99 96* 99   CO2 mmol/L 28.6 25.7 28.9   BUN mg/dL 25* 50* 39*   CREATININE mg/dL 4.54* 6.99* 5.51*   CALCIUM mg/dL 9.4 9.7* 9.7*   GLUCOSE mg/dL 92 89 97       Estimated Creatinine Clearance: 10.6 mL/min (A) (by C-G formula based on SCr of 4.54 mg/dL (H)).    Results from last 7 days   Lab Units 05/30/22  0558 05/29/22  0514 05/28/22  0757   PHOSPHORUS mg/dL 5.8* 5.3* 4.5             Results from last 7 days   Lab Units 06/02/22  0641 05/30/22 0558 05/29/22 0514 05/28/22  0758 05/27/22  0828   WBC 10*3/mm3 8.60 9.99 9.00 10.27 8.57   HEMOGLOBIN g/dL 7.8* 8.4* 7.8* 8.6* 8.1*   PLATELETS 10*3/mm3 186 213 181 176 161             Assessment / Plan     ASSESSMENT:  1. ESRD. Last dialysis friday without any difficulties, appears to be euvolemic and electrolyte within acceptable range.   2. Acute rhinovirus bronchitis, aspiration PNA.   3. Right greater trochanter fracture in past with hip screw in place.   4. Anemia CKD.  Hemoglobin 7.8 today, treated with long-acting MINESH as an outpatient  5. NSTEMI type II. Followed by cardiology  6. Alzheimer's Dementia    PLAN:    1.  Will continue regular schedule of MWF dialysis treatments.   2. Patient is stable from a renal standpoint to proceed with discharge to rehab facility. Would continue to follow patient in outpatient dialysis setting.   2.  Surveillance lab    I discussed the case with the patient's son at the bedside  Radha Harman MD  06/02/22  11:24 EDT    [facility]  [phone]

## 2022-06-02 NOTE — PROGRESS NOTES
Name: Shaun Brewster ADMIT: 2022   : 1930  PCP: Andrew Trujillo MD    MRN: [id number] LOS: 11 days   AGE/SEX: 89 y.o. male  ROOM: [facility]     Subjective   Subjective   Patient is lying in the bed and is in no major distress.   Denies nausea, vomiting, abdominal pain, chest pain.     Objective   Objective   Vital Signs  Temp:  [97.1 °F (36.2 °C)-97.6 °F (36.4 °C)] 97.6 °F (36.4 °C)  Heart Rate:  [72-83] 72  Resp:  [18] 18  BP: ()/(50-81) 90/66  SpO2:  [90 %-99 %] 90 %  on   ;   Device (Oxygen Therapy): room air  Body mass index is 21.06 kg/m².  Physical Exam  Constitutional:       General: He is not in acute distress.     Appearance: He is not diaphoretic.      Comments: Frail   HENT:      Head: Normocephalic and atraumatic.      Nose: Nose normal.      Mouth/Throat:      Mouth: Mucous membranes are moist.   Eyes:      Extraocular Movements: Extraocular movements intact.      Pupils: Pupils are equal, round, and reactive to light.   Cardiovascular:      Rate and Rhythm: Normal rate and regular rhythm.   Pulmonary:      Effort: Pulmonary effort is normal. No respiratory distress.      Breath sounds: No wheezing or rhonchi.   Abdominal:      Palpations: Abdomen is soft.      Tenderness: There is no abdominal tenderness. There is no guarding.   Musculoskeletal:      Cervical back: Normal range of motion and neck supple.      Right lower leg: No edema.      Left lower leg: No edema.   Skin:     General: Skin is warm and dry.   Neurological:      General: No focal deficit present.      Mental Status: He is alert and oriented to person, place, and time.      Comments: Pleasantly confused         Results Review     I reviewed the patient's new clinical results.  Results from last 7 days   Lab Units 22  0641 22  0558 22  0514 22  0758   WBC 10*3/mm3 8.60 9.99 9.00 10.27   HEMOGLOBIN g/dL 7.8* 8.4* 7.8* 8.6*   PLATELETS 10*3/mm3 186 213 181 176     Results from last 7 days    Lab Units 06/02/22  0641 05/30/22  0558 05/29/22  0514 05/28/22  0757   SODIUM mmol/L 136 137 139 142   POTASSIUM mmol/L 5.5* 4.3 4.4 4.0   CHLORIDE mmol/L 99 96* 99 101   CO2 mmol/L 28.6 25.7 28.9 26.1   BUN mg/dL 25* 50* 39* 26*   CREATININE mg/dL 4.54* 6.99* 5.51* 4.01*   GLUCOSE mg/dL 92 89 97 90   Estimated Creatinine Clearance: 10.6 mL/min (A) (by C-G formula based on SCr of 4.54 mg/dL (H)).  Results from last 7 days   Lab Units 05/30/22  0558 05/29/22  0514 05/28/22  0757 05/27/22  0828   ALBUMIN g/dL 2.90* 2.90* 3.40* 3.10*     Results from last 7 days   Lab Units 06/02/22 0641 05/30/22 0558 05/29/22  0514 05/28/22  0757 05/27/22  0828   CALCIUM mg/dL 9.4 9.7* 9.7* 9.6 9.4   ALBUMIN g/dL  --  2.90* 2.90* 3.40* 3.10*   PHOSPHORUS mg/dL  --  5.8* 5.3* 4.5 6.4*         COVID19   Date Value Ref Range Status   05/22/2022 Not Detected Not Detected - Ref. Range Final   04/21/2022 Not Detected Not Detected - Ref. Range Final     No results found for: HGBA1C, POCGLU    Adult Transthoracic Echo Complete W/ Cont if Necessary Per Protocol  · Left ventricular wall thickness is consistent with moderate concentric   hypertrophy.  · Estimated left ventricular EF = 30% Left ventricular systolic function   is moderately decreased.  · Left ventricular diastolic function was indeterminate.  · Moderately reduced right ventricular systolic function noted.  · The right atrial cavity is severely dilated.  · Mild aortic valve stenosis is present.  · Peak velocity of the flow distal to the aortic valve is 200.9 cm/s.  · Moderate mitral valve regurgitation is present.  · Estimated right ventricular systolic pressure from tricuspid   regurgitation is normal (<35 mmHg).       I reviewed the patient's daily medications.  Scheduled Medications  benzonatate, 200 mg, Oral, Nightly  carvedilol, 6.25 mg, Oral, BID With Meals  donepezil, 10 mg, Oral, Nightly  famotidine, 40 mg, Oral, Daily  finasteride, 5 mg, Oral, Daily  guaifenesin, 200  mg, Oral, Nightly  HYDROcodone Bit-Homatrop MBr, 5 mL, Oral, Nightly  lidocaine-prilocaine, 1 application, Topical, Once per day on Mon Wed Fri  polyethylene glycol, 17 g, Oral, Daily  QUEtiapine, 25 mg, Oral, Nightly  rivaroxaban, 15 mg, Oral, Once per day on Mon Wed Fri Sat  sevelamer, 2,400 mg, Oral, TID With Meals    Infusions   Diet  Diet Dysphagia; IV - Mechanical Soft No Mixed Consistencies; Nectar / Syrup Thick       Assessment/Plan     Active Hospital Problems    Diagnosis  POA   • **Pneumonia of both lungs due to infectious organism [J18.9]  Yes   • Chronic systolic CHF (congestive heart failure) ([facility]) [I50.22]  Yes   • NSTEMI (non-ST elevated myocardial infarction) ([facility]) [I21.4]  Yes   • Acute respiratory failure with hypoxia ([facility]) [J96.01]  Yes   • Rhinovirus infection [B34.8]  Yes   • DNR (do not resuscitate) [Z66]  Yes   • Late onset Alzheimer's disease without behavioral disturbance ([facility]) [G30.1, F02.80]  Yes   • At risk for falls [Z91.81]  Not Applicable   • Hypertension [I10]  Yes   • ESRD (end stage renal disease) ([facility]) [N18.6]  Yes   • Pleural effusion on right [J90]  Yes   • Chronic atrial fibrillation ([facility]) [I48.20]  Yes      Resolved Hospital Problems   No resolved problems to display.       89 y.o. male with history of Alzheimer's dementia, A. Fib, heart failure and ESRD who presented with worsening cough.  He was found to have rhinovirus bronchitis and aspiration pneumonia.  Hospital course was complicated by type II NSTEMI and right trochanteric fracture.      Acute Rhinovirus Bronchitis with Superimposed Bacterial Pneumonia, likely aspiration pneumonia:  -Aspiration pneumonia has been treated with 5 days of antibiotics  -Sleep therapy did evaluate and recommended nectar thick liquids which patient does not like and son and daughter are aware of the same and they are okay with him being on thin liquids.  They all verbalized risk of aspiration which could be fatal.     ESRD  - continue  "hemodialysis per nephrology.  He is due for dialysis today.     Type II NSTEMI  -Troponins peaked at 0.27 and were flat.  No chest pain.  Evaluated by cardiology who recommended against invasive testing at this time due to his age and comorbidities.  Family is in agreement.  -Currently on Coreg and Xarelto.    Right Trochanteric Fracture  -Evaluated by orthopedics, no need for surgical intervention.  Hardware from previous hip fracture repair will \"protect from his current fracture\".  Okay for weightbearing as tolerated.  Pain is reasonably well controlled.    Heart failure with decreased ejection fraction (30%)  -Currently appears euvolemic.  Continue beta-blocker.  Not on ACE/ARB as blood pressure would not tolerate     Chronic Afib  - cardizem changed to carvedilol due to decreased ejection fraction  -Xarelto dosed after dialysis     Alzheimer's Dementia  - continue aricept  -Delirium precautions      · Xarelto (home med) for DVT prophylaxis.  · DNR/DNI  · Discussed with patient, family and nursing staff.  · Disposition to rehab once bed is available, hopefully tomorrow      Richar Escudero MD  [facility]  06/02/22  15:26 EDT    Patient was placed in face mask on first look.  I wore protective equipment throughout this patient encounter including a face mask, gloves and protective eyewear.  Hand hygiene was performed before donning protective equipment and after removal when leaving the room.        "

## 2022-06-02 NOTE — THERAPY TREATMENT NOTE
Patient Name: Shaun Brewster  : 1930    MRN: [id number]                              Today's Date: 2022       Admit Date: 2022    Visit Dx:     ICD-10-CM ICD-9-CM   1. Pneumonia of both lungs due to infectious organism, unspecified part of lung  J18.9 483.8   2. Closed nondisplaced fracture of greater trochanter of right femur, initial encounter (Formerly [facility])  S72.114A 820.20   3. ESRD (end stage renal disease) (Formerly [facility])  N18.6 585.6     Patient Active Problem List   Diagnosis   • Ventricular tachycardia (Formerly [facility])   • Chronic atrial fibrillation (Formerly [facility])   • Hypertension   • ESRD (end stage renal disease) (Formerly [facility])   • Closed fracture of multiple ribs of right side   • Pleural effusion on right   • Kidney cysts   • Liver cyst   • Acute biliary pancreatitis without infection or necrosis   • Late onset Alzheimer's disease without behavioral disturbance ([facility]ly [facility])   • At risk for falls   • Pneumonia of both lungs due to infectious organism   • Acute respiratory failure with hypoxia ([facility]ly [facility])   • Rhinovirus infection   • DNR (do not resuscitate)   • Chronic systolic CHF (congestive heart failure) (Formerly [facility])   • NSTEMI (non-ST elevated myocardial infarction) (Formerly [facility])     Past Medical History:   Diagnosis Date   • Anemia    • 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[facility]ly [facility])    • Arthritis    • Atypical chest pain    • Blind right eye    • Chronic kidney disease (CKD), stage V (Formerly [facility])    • Difficulty walking    • Dizziness    • Dyslipidemia    • Esophageal reflux    • Esophagitis, reflux    • Femur fracture, right ([facility])    • HL (hearing loss)    • Hypertension    • Macular degeneration    • Malignant neoplasm of prostate ([facility]ly [facility])     gland   • Memory loss    • Nephrolithiasis    • Nonspecific abnormal finding    • Paroxysmal atrial fibrillation ([facility])    • Postnasal drip    • Premature ventricular contractions    • Renal disease    • Throat pain    • Urge incontinence of urine    • Ventricular tachycardia ([facility]ly [facility])      Past Surgical History:   Procedure Laterality Date   •  ARTERIOVENOUS FISTULA Left 2015   • HAND SURGERY Bilateral    • HIP SURGERY     • INGUINAL HERNIA REPAIR     • KNEE SURGERY     • OTHER SURGICAL HISTORY      cardiac cath procedure summary normal, corneal lasik   • REPLACEMENT TOTAL KNEE      left    • SHOULDER SURGERY     • SKIN CANCER EXCISION     • TONSILLECTOMY AND ADENOIDECTOMY        General Information     Row Name 06/02/22 1313          Physical Therapy Time and Intention    Document Type therapy note (daily note)  -     Mode of Treatment individual therapy;physical therapy  -     Row Name 06/02/22 1313          General Information    Existing Precautions/Restrictions fall  -     Row Name 06/02/22 1313          Cognition    Orientation Status (Cognition) oriented to;person;place  -     Row Name 06/02/22 1313          Safety Issues, Functional Mobility    Safety Issues Affecting Function (Mobility) awareness of need for assistance;safety precaution awareness;safety precautions follow-through/compliance;insight into deficits/self-awareness;judgment;problem-solving;positioning of assistive device  -     Impairments Affecting Function (Mobility) balance;endurance/activity tolerance;strength;cognition;coordination;postural/trunk control  -           User Key  (r) = Recorded By, (t) = Taken By, (c) = Cosigned By    Initials Name Provider Type     Shanta Gupta PTA Physical Therapist Assistant               Mobility     Row Name 06/02/22 1313          Bed Mobility    Supine-Sit [place] (Bed Mobility) minimum assist (75% patient effort);verbal cues;nonverbal cues (demo/gesture)  -     Assistive Device (Bed Mobility) bed rails;head of bed elevated  -     Comment, (Bed Mobility) per pt's daughter, pt did not sleep well last night so pt has been feeling tired this AM.  -     Row Name 06/02/22 1313          Bed-Chair Transfer    Bed-Chair [place] (Transfers) minimum assist (75% patient effort)  -     Assistive Device (Bed-Chair  Transfers) --  HHAX2  -EB     Comment, (Bed-Chair Transfer) pivoted from w/c to recliner.  -     Row Name 06/02/22 1313          Sit-Stand Transfer    Sit-Stand [place] (Transfers) minimum assist (75% patient effort)  -EB     Assistive Device (Sit-Stand Transfers) walker, front-wheeled  -EB     Comment, (Sit-Stand Transfer) from EOB and one from w/c.  -     Row Name 06/02/22 1313          Gait/Stairs (Locomotion)    [place] Level (Gait) contact guard;minimum assist (75% patient effort)  -     Assistive Device (Gait) walker, front-wheeled  -EB     Distance in Feet (Gait) 40ft  -EB     Deviations/Abnormal Patterns (Gait) keagan decreased;gait speed decreased;stride length decreased;base of support, narrow  -EB     Bilateral Gait Deviations forward flexed posture  -EB     Comment, (Gait/Stairs) cueing for head up and to correct posture. Pt fatigued and leaning significantly forward during amb. Pt needed w/c to be pulled up behind as pt was feeling very fatigued. Pt not following commands well today.  -     Row Name 06/02/22 1313          Mobility    Extremity Weight-bearing Status right lower extremity  -     Right Lower Extremity (Weight-bearing Status) weight-bearing as tolerated (WBAT)  -           User Key  (r) = Recorded By, (t) = Taken By, (c) = Cosigned By    Initials Name Provider Type    EB Shanta Gupta PTA Physical Therapist Assistant               Obj/Interventions    No documentation.                Goals/Plan    No documentation.                Clinical Impression     Row Name 06/02/22 1321          Plan of Care Review    Plan of Care Reviewed With patient  -EB     Progress no change  -     Outcome Evaluation Pt tolerated treatment with no acute complaints but fatigued today. Pt required increased assistance today needing John with bed mobility and with sit<->stand transfers. pt ambulated 40ft with rwx CGA/John. pt required cues for upright posture and to keep head up. Pt feeling  very fatigued and needed w/c to be pulled up behind to allow for pt to sit. Wheeled pt back to room and transferred pt to recliner with John. will progress pt as tolerated.  -EB     Row Name 06/02/22 1321          Therapy Assessment/Plan (PT)    Therapy Frequency (PT) 5 times/wk  -EB     Row Name 06/02/22 1321          Positioning and Restraints    Pre-Treatment Position in bed  -EB     Post Treatment Position chair  -EB     In Chair reclined;call light within reach;exit alarm on;encouraged to call for assist  -EB           User Key  (r) = Recorded By, (t) = Taken By, (c) = Cosigned By    Initials Name Provider Type    Shanta White PTA Physical Therapist Assistant               Outcome Measures     Row Name 06/02/22 1327 06/02/22 0839       How much help from another person do you currently need...    Turning from your back to your side while in flat bed without using bedrails? 3  -EB 3  -RW    Moving from lying on back to sitting on the side of a flat bed without bedrails? 3  -EB 3  -RW    Moving to and from a bed to a chair (including a wheelchair)? 3  -EB 3  -RW    Standing up from a chair using your arms (e.g., wheelchair, bedside chair)? 3  -EB 3  -RW    Climbing 3-5 steps with a railing? 1  -EB 1  -RW    To walk in hospital room? 3  -EB 3  -RW    AM-PAC 6 Clicks Score (PT) 16  -EB 16  -RW    Highest level of mobility 5 --> Static standing  -EB 5 --> Static standing  -RW          User Key  (r) = Recorded By, (t) = Taken By, (c) = Cosigned By    Initials Name Provider Type    Chandler Coffey, RN Registered Nurse    Shanta White PTA Physical Therapist Assistant                             Physical Therapy Education                 Title: PT OT SLP Therapies (Done)     Topic: Physical Therapy (Done)     Point: Mobility training (Done)     Learning Progress Summary           Patient Acceptance, E,D, VU,NR by ITMA at 6/2/2022 1328    Acceptance, E,TB, VU,NR by KING at 5/31/2022 1509    Acceptance, E,D, VU,NR by  EB at 5/30/2022 1302    Acceptance, E,TB,D, VU,NR by EB at 5/26/2022 1635    Acceptance, E, VU,NR by EM at 5/25/2022 1312    Acceptance, E,TB, VU,NR by EE at 5/24/2022 1118   Family Acceptance, E,TB, VU,NR by RW at 5/31/2022 1509    Acceptance, E, VU,NR by EM at 5/25/2022 1312                   Point: Home exercise program (Done)     Learning Progress Summary           Patient Acceptance, E,D, VU,NR by EB at 6/2/2022 1328    Acceptance, E,TB, VU,NR by RW at 5/31/2022 1509    Acceptance, E,D, VU,NR by EB at 5/30/2022 1302    Acceptance, E,TB,D, VU,NR by EB at 5/26/2022 1635    Acceptance, E,TB, VU,NR by EE at 5/24/2022 1118   Family Acceptance, E,TB, VU,NR by RW at 5/31/2022 1509                   Point: Body mechanics (Done)     Learning Progress Summary           Patient Acceptance, E,D, VU,NR by EB at 6/2/2022 1328    Acceptance, E,TB, VU,NR by RW at 5/31/2022 1509    Acceptance, E,D, VU,NR by EB at 5/30/2022 1302    Acceptance, E,TB,D, VU,NR by EB at 5/26/2022 1635    Acceptance, E,TB, VU,NR by EE at 5/24/2022 1118   Family Acceptance, E,TB, VU,NR by RW at 5/31/2022 1509                   Point: Precautions (Done)     Learning Progress Summary           Patient Acceptance, E,D, VU,NR by EB at 6/2/2022 1328    Acceptance, E,TB, VU,NR by RW at 5/31/2022 1509    Acceptance, E,D, VU,NR by EB at 5/30/2022 1302    Acceptance, E,TB,D, VU,NR by EB at 5/26/2022 1635   Family Acceptance, E,TB, VU,NR by RW at 5/31/2022 1509                               User Key     Initials Effective Dates Name Provider Type Discipline     06/16/21 -  Patricia Frias, PT Physical Therapist PT    EM 06/16/21 -  Danica Kinsey PT Physical Therapist PT    RW 06/16/21 -  Chandler Loving RN Registered Nurse Nurse    EB 06/16/21 -  Shanta Gupta PTA Physical Therapist Assistant PT              PT Recommendation and Plan     Plan of Care Reviewed With: patient  Progress: no change  Outcome Evaluation: Pt tolerated treatment with no acute  complaints but fatigued today. Pt required increased assistance today needing John with bed mobility and with sit<->stand transfers. pt ambulated 40ft with rwx CGA/John. pt required cues for upright posture and to keep head up. Pt feeling very fatigued and needed w/c to be pulled up behind to allow for pt to sit. Wheeled pt back to room and transferred pt to recliner with John. will progress pt as tolerated.     Time Calculation:    PT Charges     Row Name 06/02/22 1312             Time Calculation    Start Time 1036  -EB      Stop Time 1102  -EB      Time Calculation (min) 26 min  -EB      PT Received On 06/02/22  -EB      PT - Next Appointment 06/03/22  -EB              Time Calculation- PT    Total Timed Code Minutes- PT 26 minute(s)  -EB            User Key  (r) = Recorded By, (t) = Taken By, (c) = Cosigned By    Initials Name Provider Type    EB Shanta Gupta PTA Physical Therapist Assistant              Therapy Charges for Today     Code Description Service Date Service Provider Modifiers Qty    73447602822 HC GAIT TRAINING EA 15 MIN 6/2/2022 Shanta Gupta PTA GP 1    77739644787 HC PT THERAPEUTIC ACT EA 15 MIN 6/2/2022 Shanta Gupta PTA GP 1          PT G-Codes  Outcome Measure Options: AM-PAC 6 Clicks Basic Mobility (PT)  AM-PAC 6 Clicks Score (PT): 16    Shanta Gupta PTA  6/2/2022

## 2022-06-02 NOTE — PLAN OF CARE
Goal Outcome Evaluation:  Plan of Care Reviewed With: patient        Progress: improving  Outcome Evaluation: Patient   restless    this  shift.  Caregiver  at  bedside.     Incontinent  care   done  per    staff.  Waiting  on  placement  and  remain  in  Afib  on  monitor. Nursing  will  continue  to  monitor.

## 2022-06-02 NOTE — PLAN OF CARE
Goal Outcome Evaluation:  Plan of Care Reviewed With: patient        Progress: no change  Outcome Evaluation: Pt tolerated treatment with no acute complaints but fatigued today. Pt required increased assistance today needing John with bed mobility and with sit<->stand transfers. pt ambulated 40ft with rwx CGA/John. pt required cues for upright posture and to keep head up. Pt feeling very fatigued and needed w/c to be pulled up behind to allow for pt to sit. Wheeled pt back to room and transferred pt to recliner with John. will progress pt as tolerated.    ..Patient was wearing a face mask during this therapy encounter. Therapist used appropriate personal protective equipment including eye protection, mask, and gloves.  Mask used was standard procedure mask. Appropriate PPE was worn during the entire therapy session. Hand hygiene was completed before and after therapy session. Patient is not in enhanced droplet precautions.

## 2022-06-03 NOTE — PROGRESS NOTES
[facility] Progress Note      Patient Name: Shaun Brewster  : 1930  MRN: [id number]  Primary Care Physician:  Andrew Trujillo MD  Date of admission: 2022    Subjective     Interval History:   Follow up ESRD.     Patient has alzheimer's dementia. He denies any complaints today.  No new issues. Seen on HD. D/w dialysis staff   Review of Systems:   As noted above    Objective     Vitals:   Temp:  [97 °F (36.1 °C)-97.7 °F (36.5 °C)] 97.7 °F (36.5 °C)  Heart Rate:  [69-89] 75  Resp:  [16-18] 16  BP: ()/(55-83) 137/79    Intake/Output Summary (Last 24 hours) at 6/3/2022 1447  Last data filed at 6/3/2022 1200  Gross per 24 hour   Intake 320 ml   Output 400 ml   Net -80 ml       Physical Exam:    General Appearance: Awake and alert, no acute distress, appears to be chronically ill  Skin: warm and dry  HEENT: oral mucosa normal, nonicteric sclera  Neck: supple, no JVD  Lungs: Clear to auscultation, unlabored breathing effort  Heart: Irregularly irregular, no rub  Abdomen: soft, nontender, nondistended.  Normoactive bowel  Extremities: trace lower ext edema . LUE AVF patent.  Neuro: normal speech . Not oriented .      Scheduled Meds:     benzonatate, 200 mg, Oral, Nightly  carvedilol, 6.25 mg, Oral, BID With Meals  donepezil, 10 mg, Oral, Nightly  famotidine, 40 mg, Oral, Daily  finasteride, 5 mg, Oral, Daily  guaifenesin, 200 mg, Oral, Nightly  HYDROcodone Bit-Homatrop MBr, 5 mL, Oral, Nightly  lidocaine-prilocaine, 1 application, Topical, Once per day on   polyethylene glycol, 17 g, Oral, Daily  QUEtiapine, 25 mg, Oral, Nightly  rivaroxaban, 15 mg, Oral, Once per day on  Sat  sevelamer, 2,400 mg, Oral, TID With Meals      IV Meds:        Results Reviewed:   I have personally reviewed the results from the time of this admission to 6/3/2022 14:47 EDT     Results from last 7 days   Lab Units 22  0608 22  0641 22  0558   SODIUM  mmol/L 136 136 137   POTASSIUM mmol/L 5.7* 5.5* 4.3   CHLORIDE mmol/L 100 99 96*   CO2 mmol/L 26.9 28.6 25.7   BUN mg/dL 39* 25* 50*   CREATININE mg/dL 5.84* 4.54* 6.99*   CALCIUM mg/dL 9.3 9.4 9.7*   GLUCOSE mg/dL 86 92 89       Estimated Creatinine Clearance: 8.2 mL/min (A) (by C-G formula based on SCr of 5.84 mg/dL (H)).    Results from last 7 days   Lab Units 05/30/22  0558 05/29/22  0514 05/28/22  0757   PHOSPHORUS mg/dL 5.8* 5.3* 4.5             Results from last 7 days   Lab Units 06/03/22  0608 06/02/22  0641 05/30/22  0558 05/29/22  0514 05/28/22  0758   WBC 10*3/mm3 7.16 8.60 9.99 9.00 10.27   HEMOGLOBIN g/dL 7.5* 7.8* 8.4* 7.8* 8.6*   PLATELETS 10*3/mm3 178 186 213 181 176             Assessment / Plan     ASSESSMENT:  1. ESRD. Last dialysis friday without any difficulties, appears to be euvolemic and electrolyte within acceptable range.   2. Acute rhinovirus bronchitis, aspiration PNA.   3. Right greater trochanter fracture in past with hip screw in place.   4. Anemia CKD.  Hemoglobin 7.5 today, treated with long-acting MINESH as an outpatient  5. NSTEMI type II. Followed by cardiology  6. Alzheimer's Dementia    PLAN:    1.  Will continue regular schedule of MWF dialysis treatments. Seen on HD  2. Patient is stable from a renal standpoint to proceed with discharge to rehab facility. Would continue to follow patient in outpatient dialysis setting.   2.  Surveillance lab    I discussed the case with the patient's son at the bedside  Radha Harman MD  06/03/22  14:47 EDT    [facility]  [phone]

## 2022-06-03 NOTE — PLAN OF CARE
Goal Outcome Evaluation:  Plan of Care Reviewed With: patient        Progress: improving  Outcome Evaluation: Patient  resting at this  time.   Had  Tylenol  for  s/s  of  pain.  Up  to  bedside  commode    x 1 assist.  Remain  Afib  on monitor.  nursing  will  continue  to monitor.

## 2022-06-03 NOTE — NURSING NOTE
HD WITHOUT INCIDENT OR C/O. TOLERATED WELL. REMOVED 400 ML'S NET LOSS. NO MEDS ADMINISTERED. AVF NEEDLES REMOVED X2. HEMOSTASIS ACHIEVED. STABLE, NO C/O UPON COMPLETION OF TREATMENT.

## 2022-06-03 NOTE — DISCHARGE SUMMARY
NAME: Shaun Brewster ADMIT: 2022   : 1930  PCP: Andrew Trujillo MD    MRN: [id number] LOS: 12 days   AGE/SEX: 89 y.o. male  ROOM: [facility]/     Date of Admission:  2022  Date of Discharge:  6/3/2022    PCP: Andrew Trujillo MD    CHIEF COMPLAINT  Fall and Vomiting      DISCHARGE DIAGNOSIS  Active Hospital Problems    Diagnosis  POA   • **Pneumonia of both lungs due to infectious organism [J18.9]  Yes   • Chronic systolic CHF (congestive heart failure) ([facility]) [I50.22]  Yes   • NSTEMI (non-ST elevated myocardial infarction) ([facility]) [I21.4]  Yes   • Acute respiratory failure with hypoxia ([facility]) [J96.01]  Yes   • Rhinovirus infection [B34.8]  Yes   • DNR (do not resuscitate) [Z66]  Yes   • Late onset Alzheimer's disease without behavioral disturbance ([facility]) [G30.1, F02.80]  Yes   • At risk for falls [Z91.81]  Not Applicable   • Hypertension [I10]  Yes   • ESRD (end stage renal disease) ([facility]) [N18.6]  Yes   • Pleural effusion on right [J90]  Yes   • Chronic atrial fibrillation ([facility]) [I48.20]  Yes      Resolved Hospital Problems   No resolved problems to display.       SECONDARY DIAGNOSES  Past Medical History:   Diagnosis Date   • Anemia    • 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[facility])    • Arthritis    • Atypical chest pain    • Blind right eye    • Chronic kidney disease (CKD), stage V ([facility])    • Difficulty walking    • Dizziness    • Dyslipidemia    • Esophageal reflux    • Esophagitis, reflux    • Femur fracture, right ([facility])    • HL (hearing loss)    • Hypertension    • Macular degeneration    • Malignant neoplasm of prostate ([facility])     gland   • Memory loss    • Nephrolithiasis    • Nonspecific abnormal finding    • Paroxysmal atrial fibrillation ([facility])    • Postnasal drip    • Premature ventricular contractions    • Renal disease    • Throat pain    • Urge incontinence of urine    • Ventricular tachycardia ([facility])        CONSULTS   Pulmonary  Nephrology  Cardiology  Orthopedics    [facility]  COURSE  Patient is a 89 y.o. male with history of Alzheimer's dementia, A. fib and ESRD who presented with cough.  He was found to have rhinovirus bronchitis and aspiration pneumonia.  Hospital course was complicated by type II NSTEMI and right hip fracture.    Aspiration pneumonia treated with 5 days of ceftriaxone and Unasyn.  He is comfortable on room air.  Supportive care for his rhinovirus upper respiratory infection    Patient with chronic A. fib on rate control agents and seen by cardiology.  Did have elevation of troponin but cardiology did not feel there is clear evidence for ischemia and given his advanced dementia did not recommend additional testing at this time after discussion with patient and family.    Orthopedics had evaluated the patient and with previous hardware they did not feel he needed any orthopedic surgery.    He is doing well and will be discharged to subacute rehab today.  He will continue to get dialysis at rehab.  Does have anemia of chronic kidney disease with hemoglobin 7.5 -10. No evidence for acute bleeding. Can get EPO/Iron as indicated b Nephrology as an outpatient.       DIAGNOSTICS    CT Head Without Contrast [[id number]] Henrique as Reviewed   Order Status: Completed Collected: 05/22/22 2054    Updated: 05/22/22 2112   Narrative:     CT HEAD WO CONTRAST-       CLINICAL HISTORY: Patient fell. Headache. Vomiting.       TECHNIQUE: Transverse 3 mm thick images were obtained from the base of   the skull to the vertex without IV contrast.       Radiation dose reduction techniques were utilized, including automated   exposure control and exposure modulation based on body size.       COMPARISON: CT head dated 07/21/2020.       FINDINGS: There is moderate diffuse cortical atrophy. There is a small   punctate area of dystrophic calcification in the medial aspect of the   right posterior frontal region that is unchanged. There is no evidence   of recent or old infarct or hemorrhage. There is  no mass effect.. The   paranasal sinuses and mastoid air cells and middle ear cavities are well   aerated. Bone window images demonstrate no evidence of skull fracture.       Impression:     Diffuse cortical atrophy. No acute intracranial   abnormalities are identified.       This report was finalized on 5/22/2022 8:58 PM by Dr. James Lomeli M.D.       CT Abdomen Pelvis Without Contrast [[id number]] Henrique as Reviewed   Order Status: Completed Collected: 05/22/22 2102    Updated: 05/22/22 2115   Narrative:     CT ABDOMEN PELVIS WO CONTRAST-       CLINICAL HISTORY: Vomiting       TECHNIQUE: Spiral CT images were acquired through the abdomen and pelvis   with no oral or IV contrast and were reconstructed in 3 mm thick slices.       Radiation dose reduction techniques were utilized, including automated   exposure control and exposure modulation based on body size.       COMPARISON: CT scan of the abdomen and pelvis dated 12/22/2018. CT scan   of the chest dated 04/21/2022.       FINDINGS: There are multiple cysts of varying size scattered throughout   both lobes of the liver, most of which have diminished in size   significantly since the preceding CT scan. For instance, a cyst in the   anterior aspect of the left lobe that currently measures up to 5.8 cm in   diameter previously measured up to 6.4 cm in diameter. An exophytic cyst   in the far posterior aspect of the right lobe that measured 5.6 x 4.1 cm   on the previous CT scan now measures 3.1 x 2.8 cm. Multiple tiny   gallstones are present within the gallbladder lumen. The gallbladder is   otherwise unremarkable. There is no bile duct dilatation. The spleen and   pancreas and adrenal glands appear within normal limits. The kidneys are   atrophic and are essentially replaced by  innumerable cysts consistent   with adult onset polycystic kidney disease. Extensive vascular   calcification is noted. The stomach and small and large bowel are   unremarkable except for  numerous diverticuli in the sigmoid colon. There   is no CT evidence of diverticulitis. Images through the lung bases   demonstrate a moderate-sized posteriorly layering right pleural effusion   that has increased in size significantly since the preceding chest CT   dated 04/21/2022. There is also focal area of pleural-based   consolidation in the right lower lobe adjacent to the effusion that is   new. Patchy reticulonodular opacities also noted in both lower lobes   that are new.       Impression:     Multiple hepatic cysts showing decrease in size since the   preceding CT abdomen and pelvis in 2018. The kidneys are atrophic and   are essentially replaced by innumerable cysts of varying size. The   findings are consistent with adult onset polycystic kidney disease.   Cholelithiasis. Extensive vascular calcification. Extensive sigmoid   diverticulosis without evidence of diverticulitis. Moderate-sized right   pleural effusion. Focal consolidation at the right lung base and also   patchy reticulonodular opacities in both lower lobes consistent with   pneumonia. The findings in the chest are new since the preceding chest   CT dated 04/21/2022.      06/03/2022 0608 06/03/2022 0649 Basic Metabolic Panel [[id number]]    (Abnormal)   Blood    Final result Component Value Units   Glucose 86 mg/dL   BUN 39 High  mg/dL   Creatinine 5.84 High  mg/dL   Sodium 136 mmol/L   Potassium 5.7 High  mmol/L   Chloride 100 mmol/L   CO2 26.9 mmol/L   Calcium 9.3 mg/dL   BUN/Creatinine Ratio 6.7 Low     Anion Gap 9.1 mmol/L   eGFR 8.5 Low   mL/min/1.73          06/03/2022 0608 06/03/2022 0626 CBC Auto Differential [[id number]]   (Abnormal)   Blood    Final result Component Value Units   WBC 7.16 10*3/mm3   RBC 2.50 Low  10*6/mm3   Hemoglobin 7.5 Low  g/dL   Hematocrit 23.4 Low  %   MCV 93.6 fL   MCH 30.0 pg   MCHC 32.1 g/dL   RDW 16.5 High  %   RDW-SD 55.8 High  fl   MPV 10.2 fL   Platelets 178 10*3/mm3   Neutrophil % 73.9 %   Lymphocyte %  13.0 Low  %   Monocyte % 8.2 %   Eosinophil % 3.9 %   Basophil % 0.4 %            PHYSICAL EXAM  Objective    Alert  Chronically ill  No resp distress  On HD currently    CONDITION ON DISCHARGE  Stable.      DISCHARGE DISPOSITION   Skilled Nursing Facility (DC - External)      DISCHARGE MEDICATIONS       Your medication list      START taking these medications      Instructions Last Dose Given Next Dose Due   acetaminophen 325 MG tablet  Commonly known as: TYLENOL      Take 2 tablets by mouth Every 4 (Four) Hours As Needed for Mild Pain .       carvedilol 6.25 MG tablet  Commonly known as: COREG      Take 1 tablet by mouth 2 (Two) Times a Day With Meals.       guaifenesin 100 MG/5ML liquid  Commonly known as: ROBITUSSIN      Take 10 mL by mouth Every Night.       HYDROcodone Bit-Homatrop MBr 5-1.5 MG/5ML solution  Commonly known as: HYCODAN      Take 5 mL by mouth Every 6 (Six) Hours As Needed for Cough for up to 2 days.          CHANGE how you take these medications      Instructions Last Dose Given Next Dose Due   benzonatate 100 MG capsule  Commonly known as: TESSALON  What changed:   · medication strength  · how much to take  · how to take this  · when to take this  · reasons to take this  · additional instructions      Take 1 capsule by mouth 3 (Three) Times a Day As Needed for Cough.          CONTINUE taking these medications      Instructions Last Dose Given Next Dose Due   Azelastine-Fluticasone 137-50 MCG/ACT suspension      2 sprays each nostril bid       donepezil ODT 10 MG disintegrating tablet  Commonly known as: ARICEPT ODT      Place 1 tablet on the tongue Every Night for 90 days.       famotidine 40 MG tablet  Commonly known as: PEPCID      Take 1 tablet by mouth Daily.       finasteride 5 MG tablet  Commonly known as: PROSCAR      TAKE 1 TABLET DAILY       lidocaine-prilocaine 2.5-2.5 % cream  Commonly known as: EMLA      APPLY SMALL AMOUNT TO ACCESS SITE (AVF) 1 HOUR BEFORE DIALYSIS. COVER WITH  OCCLUSIVE DRESSING (SARAN WRAP)       MUCINEX ALLERGY PO      Take  by mouth 2 (Two) Times a Day.       multivitamin tablet tablet      Take  by mouth Daily.       multivitamin with minerals tablet tablet      Take  by mouth 2 (Two) Times a Day.       polyethylene glycol packet  Commonly known as: MIRALAX      Take 17 g by mouth Every 3 (Three) Days.       QUEtiapine 25 MG tablet  Commonly known as: SEROquel      TAKE 1 TABLET NIGHTLY       Renvela 800 MG tablet  Generic drug: sevelamer      Take 800 mg by mouth 3 (Three) Times a Day. Take 3 tablets x3 daily       rivaroxaban 15 MG tablet  Commonly known as: Xarelto      Take 1 tablet by mouth Daily. Take one tablet my mouth on Monday, Wednesday, Friday and Saturday       Vitamin B-12 1000 MCG sublingual tablet      Place  under the tongue Daily.          STOP taking these medications    Dilt- MG 24 hr capsule  Generic drug: dilTIAZem XR              Where to Get Your Medications      You can get these medications from any pharmacy    Bring a paper prescription for each of these medications  · HYDROcodone Bit-Homatrop MBr 5-1.5 MG/5ML solution     Information about where to get these medications is not yet available    Ask your nurse or doctor about these medications  · acetaminophen 325 MG tablet  · benzonatate 100 MG capsule  · carvedilol 6.25 MG tablet  · guaifenesin 100 MG/5ML liquid          Future Appointments   Date Time Provider Department Center   10/13/2022 10:00 AM Munira Catherine APRN [facility]   10/20/2022 10:30 AM Shaun Fox Jr., MD [facility]      Follow-up Information     Andrwe Trujillo MD .    Specialties: Sports Medicine, Family Medicine  Contact information:  [address] [facility]  [address] [address] [phone]                         TEST  RESULTS PENDING AT DISCHARGE         Velasquez Vidal MD  [facility]  06/03/22  10:27 EDT      Time: greater than 32 minutes on  discharge  It was a pleasure taking care of this patient while in the hospital.

## 2022-06-03 NOTE — PLAN OF CARE
Goal Outcome Evaluation:  Plan of Care Reviewed With: patient, family           Outcome Evaluation: Pt to be DC to [place], transport schduduled fat 1700 by Char CARROLL/ROJELIO tomas.  Spoke to KAMRAN Johnson at facility and gave report.  VSS.  Will cont to monitor.

## 2022-06-03 NOTE — CASE MANAGEMENT/SOCIAL WORK
Continued Stay Note  [facility]     Patient Name: Shaun Brewster  MRN: [id number]  Today's Date: 6/3/2022    Admit Date: 5/22/2022     Discharge Plan     Row Name 06/03/22 1202       Plan    Plan Plan [facility] [facility] for skilled care.  LIV Mary RN    Patient/Family in Agreement with Plan yes    Plan Comments Spoke with pt's daughter  ( Audra Trujillo [phone]) .  Daughter has arranged transportation per Adeel Yarbrough to dialysis M-W-F while at [facility].  Per Clayton  ( [phone]) pt has a bed and can be accepted today at [facility].  Discharge Summary to be sent per Clayton.  Discharge Summary in packet.  Prescription in packet.  Packet to RN.  Pt needs repeat COVID.  RN notified.  Char CARROLL/ROJELIO Dela Cruz has been arranged to transport pt to [place] today at 1700.  Pt's family to cover the cost of $101.   Plan [facility] [facility] for skilled care.   LIV Mary RN               Discharge Codes    No documentation.               Expected Discharge Date and Time     Expected Discharge Date Expected Discharge Time    Amari 3, 2022             Nichol Mary, RN

## 2022-06-03 NOTE — PROGRESS NOTES
[facility] INPATIENT PROGRESS NOTE         [address]    6/3/2022      PATIENT IDENTIFICATION:  Name: Shaun Brewster ADMIT: 2022   : 1930  PCP: Andrew Trujillo MD    MRN: [id number] LOS: 12 days   AGE/SEX: 89 y.o. male  ROOM: [facility]                     LOS 12    Reason for visit: Pneumonia with pleural effusions and hypoxemia      SUBJECTIVE:      No new issues.      Objective   OBJECTIVE:    Vital Sign Min/Max for last 24 hours  Temp  Min: 97 °F (36.1 °C)  Max: 97.6 °F (36.4 °C)   BP  Min: 90/66  Max: 120/64   Pulse  Min: 69  Max: 89   Resp  Min: 18  Max: 18   SpO2  Min: 90 %  Max: 98 %   No data recorded   Weight  Min: 72 kg (158 lb 11.7 oz)  Max: 72 kg (158 lb 11.7 oz)                         Body mass index is 20.94 kg/m².    Intake/Output Summary (Last 24 hours) at 6/3/2022 0754  Last data filed at 2022  Gross per 24 hour   Intake 720 ml   Output --   Net 720 ml         Exam:  GEN:  No distress, appears stated age  EYES:   anicteric sclerae  ENT:    External ears/nose normal, OP clear   NECK:  No adenopathy, midline trachea  LUNGS: Nonlabored      Assessment     Scheduled meds:  benzonatate, 200 mg, Oral, Nightly  carvedilol, 6.25 mg, Oral, BID With Meals  donepezil, 10 mg, Oral, Nightly  famotidine, 40 mg, Oral, Daily  finasteride, 5 mg, Oral, Daily  guaifenesin, 200 mg, Oral, Nightly  HYDROcodone Bit-Homatrop MBr, 5 mL, Oral, Nightly  lidocaine-prilocaine, 1 application, Topical, Once per day on   polyethylene glycol, 17 g, Oral, Daily  QUEtiapine, 25 mg, Oral, Nightly  rivaroxaban, 15 mg, Oral, Once per day on  Sat  sevelamer, 2,400 mg, Oral, TID With Meals      IV meds:                         Data Review:  Results from last 7 days   Lab Units 22  0608 22  0641 22  0558 22  0514 22  0757   SODIUM mmol/L 136 136 137 139 142   POTASSIUM mmol/L 5.7* 5.5* 4.3 4.4 4.0   CHLORIDE mmol/L 100 99 96* 99 101   CO2  mmol/L 26.9 28.6 25.7 28.9 26.1   BUN mg/dL 39* 25* 50* 39* 26*   CREATININE mg/dL 5.84* 4.54* 6.99* 5.51* 4.01*   GLUCOSE mg/dL 86 92 89 97 90   CALCIUM mg/dL 9.3 9.4 9.7* 9.7* 9.6         Estimated Creatinine Clearance: 8.2 mL/min (A) (by C-G formula based on SCr of 5.84 mg/dL (H)).  Results from last 7 days   Lab Units 06/03/22  0608 06/02/22  0641 05/30/22  0558 05/29/22  0514 05/28/22  0758   WBC 10*3/mm3 7.16 8.60 9.99 9.00 10.27   HEMOGLOBIN g/dL 7.5* 7.8* 8.4* 7.8* 8.6*   PLATELETS 10*3/mm3 178 186 213 181 176                         No results found for: HGBA1C, POCGLU    Chest x-ray 5/22 reviewed              2D echo shows EF 30%          Microbiology reviewed              Active Hospital Problems    Diagnosis  POA   • **Pneumonia of both lungs due to infectious organism [J18.9]  Yes   • Chronic systolic CHF (congestive heart failure) ([facility]) [I50.22]  Yes   • NSTEMI (non-ST elevated myocardial infarction) ([facility]) [I21.4]  Yes   • Acute respiratory failure with hypoxia ([facility]) [J96.01]  Yes   • Rhinovirus infection [B34.8]  Yes   • DNR (do not resuscitate) [Z66]  Yes   • Late onset Alzheimer's disease without behavioral disturbance ([facility]) [G30.1, F02.80]  Yes   • At risk for falls [Z91.81]  Not Applicable   • Hypertension [I10]  Yes   • ESRD (end stage renal disease) ([facility]) [N18.6]  Yes   • Pleural effusion on right [J90]  Yes   • Chronic atrial fibrillation ([facility]) [I48.20]  Yes      Resolved Hospital Problems   No resolved problems to display.         ASSESSMENT:    Acute rhinovirus bronchitis  Aspiration pneumonia  Bilateral pleural effusions right greater than left  Hypoxemia  Sepsis present on presentation  Right greater trochanteric fracture: Cleared for PT per Ortho  Cardiomegaly with systolic heart failure: EF 30%  AMS/Agitation and encephalopathy on baseline Alzheimer's dementia  Anemia of chronic disease  ESRD      PLAN:    Completed antibiotics for aspiration pneumonia.  Encourage pulmonary toilet.   Elevate head of the bed for aspiration precautions.  Treatment for rhinovirus is supportive.  Bronchodilator prn for wheezing.  DNR.  CCP working on discharge.  Following peripherally for pulmonary issues and no objection to discharge to skilled nursing unit when available.  Likely discharge today after dialysis.      Henrique Prince MD  Pulmonary and Critical Care Medicine  [facility], [facility]  6/3/2022    07:54 EDT

## 2022-06-05 NOTE — CASE MANAGEMENT/SOCIAL WORK
Case Management Discharge Note      Final Note: DC'd to skilled bed at [facility] via Char W/C genoveva 6/3                     Transportation Services  W/C Van: [facility]    Final Discharge Disposition Code: 03 - skilled nursing facility (SNF)

## 2022-06-26 NOTE — HOME HEALTH
"Caregivers report a fall in the home on May 22nd (possibly due to not having his shoes on and slipping on the floor).  He sustained a right greater trochanter fracture but was deemed non-surgical.  The family reports he was having some congestion prior to the fall and while in the hospital was diagnosed with B pneumonia.  He was at the hospital from 5/22 to 6/3 then went to [facility] rehab until 6/23.  Caregivers report he is weaker now.  Unable to toilet and wearing briefs.  His walking is more impaired, requiring contact guard assist.   PMH/PLOF:  Extensive medical history with most significant being chronic A-fib, Alzheimer's dementia, ESRD on HD Monday, Wed and Friday. History of possible dysphagia, but son said he has been through swallow studies and \"treatment\" and is on a regular diet now.  He has 24 hr care from paid caregivers and family.  His caregiver states he would use a rollator and could do most of his mobility from a supervision level.  He would need mostly set-up supervision for his ADLs with more assist needed for showers.  He would toilet with supervision.    His son-in-law is Dr. Andrew Trujillo and he will be following him for some of his care.    Assessement:  guarded prognois with his age and comorbidities.  Decline in function is as likely as progression.  Caregivers would like him to improve in transfers and gait to lessen the burden of care and decrease his falls risk and that is the goal of home therapies.  Caregivers willing to follow-up with teaching.      Plan for next visit  1.  Progress HEP   2.  Work on better B foot clearance with gait.  3.  Work with caregivers on better sit to stand transfers."
negative

## 2022-06-28 NOTE — HOME HEALTH
SUBJECTIVE: Son and hired caregiver present for today's session. Son/caregiver states that patient was very fatigued yesterday from traveling to/from HD. Caregiver reports patient required increased assist levels for transfers. During today's session the patient c/o of heel pain when donning shoes. Upon further inspection patient was noted to have a stage 2 ulcer on the right heel. Images were captured, education provided for wound care and pressure relief. Nursing to be added for further care.     WOUND: Right heel stage 2 ulcer, no active drainage. Dressed with bandage using clean technique. No signs of infection.    Fall since last visit: no    Medication changes: no

## 2022-06-30 NOTE — HOME HEALTH
Patient's son said patient had been quite weak and unable to walk from recliner to bedroom until today, and he seems much stronger and better today.  CG agreed with son.  Plan for next visit: ADL safety, HEP, DME, activity tolerance, education, strengthening

## 2022-07-01 NOTE — HOME HEALTH
Patient's CG said patient has been walking more using walker vs using the wheelchair, so he must be feeling better. She said the nurse came and put a dressing on his right heel, so now he is not to get in the shower.  Plan for next visit: ADL safety, HEP, activity tolerance, education falls prevention

## 2022-07-02 NOTE — DISCHARGE INSTRUCTIONS
Do not take your Xarelto today or tomorrow, restart on Monday.  Follow-up with primary care provider and ophthalmology for recheck as needed.  Ice for pain and swelling and bruising control, ED return for worsening symptoms as needed.   previously intubated - no problems

## 2022-07-02 NOTE — ED PROVIDER NOTES
EMERGENCY DEPARTMENT ENCOUNTER    Room Number:  19/19  Date of encounter:  7/2/2022  PCP: Andrew Trujillo MD  Historian: Patient      HPI:  Chief Complaint: Fall, head injury  A complete HPI/ROS/PMH/PSH/SH/FH are unobtainable due to: Dementia    Context: Shaun Brewster is a 89 y.o. male who presents to the ED via [facility] from home after sustaining an unwitnessed fall at home.  Patient with new left periorbital contusion, epistaxis.  Patient does not remember the event, but does have a history of dementia and this is baseline for him.  On Xarelto.  Denies any pain, son states that he had a small hairline fracture in his left hip about 6 weeks ago and has been doing better recently.  Otherwise at baseline per son      MEDICAL RECORD REVIEW    Xarelto is noted on chart review in epic    PAST MEDICAL HISTORY  Active Ambulatory Problems     Diagnosis Date Noted   • Ventricular tachycardia (HCC) 08/04/2016   • Chronic atrial fibrillation ([facility]) 08/04/2016   • Hypertension 12/09/2018   • ESRD (end stage renal disease) ([facility]) 12/09/2018   • Closed fracture of multiple ribs of right side 12/09/2018   • Pleural effusion on right 12/09/2018   • Kidney cysts 12/09/2018   • Liver cyst 12/09/2018   • Acute biliary pancreatitis without infection or necrosis 12/22/2018   • Late onset Alzheimer's disease without behavioral disturbance ([facility]) 02/04/2021   • At risk for falls 02/04/2021   • Pneumonia of both lungs due to infectious organism 05/22/2022   • Acute respiratory failure with hypoxia ([facility]) 05/22/2022   • Rhinovirus infection 05/22/2022   • DNR (do not resuscitate) 05/22/2022   • Chronic systolic CHF (congestive heart failure) ([facility]) 05/28/2022   • NSTEMI (non-ST elevated myocardial infarction) ([facility]) 05/28/2022     Resolved Ambulatory Problems     Diagnosis Date Noted   • Small bowel obstruction ([facility]) 12/09/2018   • Leukocytosis 12/09/2018   • Metabolic encephalopathy 12/12/2018     Past Medical History:   Diagnosis  Date   • Anemia    • 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HCC)    • Arthritis    • Atypical chest pain    • Blind right eye    • Chronic kidney disease (CKD), stage V (HCC)    • Difficulty walking    • Dizziness    • Dyslipidemia    • Esophageal reflux    • Esophagitis, reflux    • Femur fracture, right (HCC)    • HL (hearing loss)    • Macular degeneration    • Malignant neoplasm of prostate (HCC)    • Memory loss    • Nephrolithiasis    • Nonspecific abnormal finding    • Paroxysmal atrial fibrillation (HCC)    • Postnasal drip    • Premature ventricular contractions    • Renal disease    • Throat pain    • Urge incontinence of urine          PAST SURGICAL HISTORY  Past Surgical History:   Procedure Laterality Date   • ARTERIOVENOUS FISTULA Left 2015   • HAND SURGERY Bilateral    • HIP SURGERY     • INGUINAL HERNIA REPAIR     • KNEE SURGERY     • OTHER SURGICAL HISTORY      cardiac cath procedure summary normal, corneal lasik   • REPLACEMENT TOTAL KNEE      left    • SHOULDER SURGERY     • SKIN CANCER EXCISION     • TONSILLECTOMY AND ADENOIDECTOMY           FAMILY HISTORY  Family History   Problem Relation Age of Onset   • Other Brother         acute bacterial endocarditis         SOCIAL HISTORY  Social History     Socioeconomic History   • Marital status:    Tobacco Use   • Smoking status: Former Smoker   • Smokeless tobacco: Never Used   • Tobacco comment: denies caffeine use   Vaping Use   • Vaping Use: Never used   Substance and Sexual Activity   • Alcohol use: No   • Drug use: No   • Sexual activity: Defer         ALLERGIES  Amiodarone and Nitrofurantoin        REVIEW OF SYSTEMS  Review of Systems     All systems reviewed and negative except for those discussed in HPI.       PHYSICAL EXAM    I have reviewed the triage vital signs and nursing notes.    ED Triage Vitals [07/02/22 1034]   Temp Heart Rate Resp BP SpO2   98.1 °F (36.7 °C) 88 18 152/74 97 %      Temp src Heart Rate Source Patient Position BP Location  FiO2 (%)   -- -- -- -- --       Physical Exam  General: No acute distress, nontoxic  HEENT: Mucous membranes moist, mild left periorbital contusion, dried blood at the nares bilaterally with no active epistaxis or visible source of bleeding, EOMI  Neck: Full ROM, supple, nontender  Pulm: Symmetric chest rise, nonlabored, lungs CTAB  Cardiovascular: Regular rate and rhythm, intact distal pulses  GI: Soft, nontender, nondistended, no rebound, no guarding, bowel sounds present  MSK: Full ROM of the arms and legs bilaterally, no deformity  Skin: Warm, dry  Neuro: Awake, alert, GCS 15, moving all extremities, no focal deficits  Psych: Calm, cooperative      N95, protective eye goggles, and gloves used during this encounter. Patient in surgical mask.      LAB RESULTS  No results found for this or any previous visit (from the past 24 hour(s)).      RADIOLOGY  XR Wrist 3+ View Right    Result Date: 7/2/2022  XR WRIST 3+ VW RIGHT-  INDICATIONS: Trauma  TECHNIQUE: 3 views of the right wrist  COMPARISON: None available  FINDINGS:  No acute fracture, erosion, or dislocation is identified. The bones are diffusely osteopenic. Prominent arterial calcifications are noted. Follow-up/further evaluation can be obtained as indications persist.       As described.    This report was finalized on 7/2/2022 11:12 AM by Dr. Mike Hernandez M.D.      CT Head Without Contrast, CT Cervical Spine Without Contrast, CT Facial Bones Without Contrast    Result Date: 7/2/2022  CT HEAD WO CONTRAST-, CT CERVICAL SPINE WO CONTRAST-, CT FACIAL BONES WO CONTRAST-  INDICATIONS: Trauma  TECHNIQUE: Radiation dose reduction techniques were utilized, including automated exposure control and exposure modulation based on body size. Noncontrast head CT, facial CT, cervical spine CT  COMPARISON: 07/21/2020, 05/22/2022  FINDINGS:  Head CT:  No acute intracranial hemorrhage, midline shift or mass effect. No acute territorial infarct is identified. A chronic  parenchymal calcification is seen at the posterior medial aspect the right frontal lobe. Relative prominence of CSF space around the cerebrum could reflect presence of subdural hygroma; this appearance is chronic.  Mild periventricular hypodensities suggest chronic small vessel ischemic change in a patient this age.  Arterial calcifications are seen at the base of the brain.  Ventricles, cisterns, cerebral sulci are unremarkable for patient age.    Facial CT:   A left inferior orbital wall blowout fracture is noted with small herniation of orbital fat, but no muscular entrapment demonstrated. No retrobulbar hematoma. No other facial fractures are identified.   A fluid level in the left maxillary sinus is presumably result of trauma, but could be evidence of acute sinusitis. Mild paranasal sinus mucosal thickening is present. Small likely mucous retention cyst or polyp in right maxillary sinus.  The visualized paranasal sinuses, orbits, mastoid air cells are otherwise unremarkable.    Cervical spine CT:  Mild anterolisthesis of C2 on C3, C3 on C4.  No acute fracture is identified. Assessment at the mid cervical levels is limited by motion artifact.  Facet and uncovertebral joint hypertrophy contributes to neuroforaminal narrowing, more prominent on the right at C3/4, left at C3/4, C4/5. Disc osteophyte complex appears to result in mild central stenosis at C2/3, C3/4, C4/5, C5/6, C6/7.  Mild right, minimal left pleural effusions.  Right thyroid nodularity is apparent, but suboptimally evaluated with this technique, thyroid ultrasound correlation can be obtained as indicated.        1. Left inferior orbital wall blowout fracture. No muscular entrapment. No other facial fractures identified. Fluid level in the left maxillary sinus. 2. No acute intracranial hemorrhage or hydrocephalus; chronic changes of the brain. No acute cervical fracture identified (limited by motion artifact); degenerative changes in cervical spine.  If there is further clinical concern, MRI could be considered for further evaluation. 3. Incidental findings, as noted above.  This report was finalized on 7/2/2022 12:22 PM by Dr. Mike Hernandez M.D.      XR Hip With or Without Pelvis 2 - 3 View Left    Result Date: 7/2/2022  XR HIP W OR WO PELVIS 2-3 VIEW LEFT-  INDICATIONS: Trauma  TECHNIQUE: Frontal views of the pelvis, 2 views of the left hip  COMPARISON: 05/22/2022  FINDINGS:  The previous right greater trochanter fracture is again demonstrated, and surgical hardware of the proximal right femur is redemonstrated. No new fractures are noted. No dislocation. Hip joint spaces appear preserved. Degenerative changes are seen in the partly included lower lumbar spine. Arterial calcification is noted. Follow-up/further evaluation can be obtained as indications persist.       As described.    This report was finalized on 7/2/2022 1:24 PM by Dr. Mike Hernandez M.D.        I ordered the above noted radiological studies. Reviewed by me.  See dictation for official radiology interpretation.      PROCEDURES    Procedures      MEDICATIONS GIVEN IN ER    Medications - No data to display      PROGRESS, DATA ANALYSIS, CONSULTS, AND MEDICAL DECISION MAKING    All labs have been independently reviewed by me.  All radiology studies have been reviewed by me and discussed with radiologist dictating the report.   EKG's independently viewed and interpreted by me.  Discussion below represents my analysis of pertinent findings related to patient's condition, differential diagnosis, treatment plan and final disposition.    Initial concern for skull fracture, intracranial hemorrhage, cervical fracture, facial fracture, wrist fracture, hip fracture, among others.  Plan for images and reevaluation with results.    ED Course as of 07/02/22 1810   Sat Jul 02, 2022   1237 CT Head Without Contrast  Reviewed [DC]   1237 CT Cervical Spine Without Contrast  Reviewed [DC]   1237 CT Facial  Bones Without Contrast  Reviewed [DC]   1237 XR Wrist 3+ View Right  Reviewed [DC]   1237 Son updated on the findings today of the inferior left orbital fracture, no need for emergent or urgent hospitalization at this time, awaiting x-ray results and if reassuring with no evidence of worsening or new fracture, safe for discharge with supportive care and outpatient follow-up.  Son is comfortable with this plan at this time.  Patient remains well-appearing and in no acute distress. [DC]   1337 XR Hip With or Without Pelvis 2 - 3 View Left  Reviewed [DC]      ED Course User Index  [DC] Mac Finley MD       AS OF 18:10 EDT VITALS:    BP - 113/92  HR - 95  TEMP - 98.1 °F (36.7 °C)  02 SATS - 98%        DIAGNOSIS  Final diagnoses:   Orbital wall fracture, closed, initial encounter ([facility])   History of dementia   Chronic anticoagulation   Fall, initial encounter         DISPOSITION  DISCHARGE    Patient discharged in stable condition.    Reviewed implications of results, diagnosis, meds, responsibility to follow up, warning signs and symptoms of possible worsening, potential complications and reasons to return to ER.    Patient/Family voiced understanding of above instructions.    Discussed plan for discharge, as there is no emergent indication for admission. Patient referred to primary care provider for BP management due to today's BP. Pt/family is agreeable and understands need for follow up and repeat testing.  Pt is aware that discharge does not mean that nothing is wrong but it indicates no emergency is present that requires admission and they must continue care with follow-up as given below or physician of their choice.     FOLLOW-UP  [facility] Emergency Department  [address] [facility] [address] [phone]    As needed, If symptoms worsen    Andrew Trujillo MD  [address]  [facility] [address]  [facility] [id number] [phone]    Schedule an appointment as soon as possible  for a visit   next week for recheck as needed    Andrew Bhatia MD  [address] [id number]  [phone]    Schedule an appointment as soon as possible for a visit   As needed         Medication List      No changes were made to your prescriptions during this visit.                    Mac Finley MD  07/02/22 6008

## 2022-07-02 NOTE — ED TRIAGE NOTES
Pt from home pt lost his balance while walking supposed to use wheel chair, pt has dementia. Pt hit his head, no LOC. Pt is on xarelto. Pt at baseline per EMS.

## 2022-07-04 PROBLEM — Z99.2 HEMODIALYSIS PATIENT (HCC): Status: ACTIVE | Noted: 2022-01-01

## 2022-07-04 PROBLEM — R41.82 ALTERED MENTAL STATUS: Status: ACTIVE | Noted: 2022-01-01
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
## 2022-07-04 PROBLEM — I62.9 INTRACRANIAL BLEED (HCC): Status: ACTIVE | Noted: 2022-01-01

## 2022-07-04 NOTE — PLAN OF CARE
Problem: Adult Inpatient Plan of Care  Goal: Plan of Care Review  Outcome: Ongoing, Progressing  Flowsheets (Taken 7/4/2022 1746)  Progress: no change  Plan of Care Reviewed With:   patient   daughter   son  Goal: Patient-Specific Goal (Individualized)  Outcome: Ongoing, Progressing  Goal: Absence of Hospital-Acquired Illness or Injury  Outcome: Ongoing, Progressing  Intervention: Prevent Skin Injury  Description: Perform a screening for skin injury risk, such as pressure or moisture associated skin damage on admission and at regular intervals throughout hospital stay.  Keep all areas of skin (especially folds) clean and dry.  Maintain adequate skin hydration.  Relieve and redistribute pressure and protect bony prominences; implement measures based on patient-specific risk factors.  Match turning and repositioning schedule to clinical condition.  Encourage weight shift frequently; assist with reposition if unable to complete independently.  Float heels off bed; avoid pressure on the Achilles tendon.  Keep skin free from extended contact with medical devices.  Encourage functional activity and mobility, as early as tolerated.  Use aids (e.g., slide boards, mechanical lift) during transfer.  Recent Flowsheet Documentation  Taken 7/4/2022 1700 by Kiesha Wadsworth RN  Skin Protection:   adhesive use limited   incontinence pads utilized   tubing/devices free from skin contact  Intervention: Prevent and Manage VTE (Venous Thromboembolism) Risk  Description: Assess for VTE (venous thromboembolism) risk.  Encourage and assist with early ambulation.  Initiate and maintain compression or other therapy, as indicated, based on identified risk in accordance with organizational protocol and provider order.  Encourage both active and passive leg exercises while in bed, if unable to ambulate.  Recent Flowsheet Documentation  Taken 7/4/2022 1700 by Kiesha Wadsworth RN  VTE Prevention/Management:   bilateral    dorsiflexion/plantar flexion performed  Range of Motion: active ROM (range of motion) encouraged  Intervention: Prevent Infection  Description: Maintain skin and mucous membrane integrity; promote hand, oral and pulmonary hygiene.  Optimize fluid balance, nutrition, sleep and glycemic control to maximize infection resistance.  Identify potential sources of infection early to prevent or mitigate progression of infection (e.g., wound, lines, devices).  Evaluate ongoing need for invasive devices; remove promptly when no longer indicated.  Recent Flowsheet Documentation  Taken 7/4/2022 1700 by Kiesha Wadsworth RN  Infection Prevention:   hand hygiene promoted   personal protective equipment utilized   single patient room provided   visitors restricted/screened  Goal: Optimal Comfort and Wellbeing  Outcome: Ongoing, Progressing  Intervention: Provide Person-Centered Care  Description: Use a family-focused approach to care.  Develop trust and rapport by proactively providing information, encouraging questions, addressing concerns and offering reassurance.  Acknowledge emotional response to hospitalization.  Recognize and utilize personal coping strategies.  Honor spiritual and cultural preferences.  Recent Flowsheet Documentation  Taken 7/4/2022 1700 by Kiesha Wadsworth RN  Trust Relationship/Rapport:   care explained   choices provided   thoughts/feelings acknowledged  Goal: Readiness for Transition of Care  Outcome: Ongoing, Progressing  Intervention: Mutually Develop Transition Plan  Description: Identify available resources for support (e.g., family, friends, community).  Identify and address barriers to ongoing treatment and home management (e.g., environmental, financial).  Provide opportunities to practice self-management skills.  Assess and monitor emotional readiness for transition.  Establish or reconnect linkage with outpatient providers or community-based services.  Recent Flowsheet Documentation  Taken  7/4/2022 1700 by Kiesha Wadsworth, RN  Transportation Anticipated: family or friend will provide  Patient/Family Anticipated Services at Transition: home health care  Patient/Family Anticipates Transition to:   home with family   home with help/services  Taken 7/4/2022 1657 by Kiesha Wadsworth, RN  Equipment Currently Used at Home:   walker, rolling   wheelchair   Goal Outcome Evaluation:  Plan of Care Reviewed With: patient, daughter, son        Progress: no change

## 2022-07-04 NOTE — ED PROVIDER NOTES
EMERGENCY DEPARTMENT ENCOUNTER    Room Number:  S506/1  Date of encounter:  7/4/2022  PCP: Andrew Trujillo MD  Historian: Patient and family      HPI:  Chief Complaint: Altered mental status  A complete HPI/ROS/PMH/PSH/SH/FH are unobtainable due to: Altered mental status    Context: Shaun Brewster is a 89 y.o. male who presents to the ED c/o altered mental status.  He had a recent fall (see discussion below) and has had a gradual decline since then.  Family states this morning he was lethargic, drooling and tremulous.  They also report that his speech is slurred and he is very confused.  He lives alone but has caretakers with him 24 hours/day.    Last Xarelto dose was approximately 48 hours ago    The patient was placed in a mask in triage, hand hygiene was performed before and after my interaction with the patient.  I wore a mask, safety glasses and gloves during my entire interaction with the patient.    PAST MEDICAL HISTORY  Active Ambulatory Problems     Diagnosis Date Noted   • Ventricular tachycardia ([facility]) 08/04/2016   • Chronic atrial fibrillation ([facility]) 08/04/2016   • Hypertension 12/09/2018   • ESRD (end stage renal disease) ([facility]) 12/09/2018   • Closed fracture of multiple ribs of right side 12/09/2018   • Pleural effusion on right 12/09/2018   • Kidney cysts 12/09/2018   • Liver cyst 12/09/2018   • Acute biliary pancreatitis without infection or necrosis 12/22/2018   • Late onset Alzheimer's disease without behavioral disturbance ([facility]) 02/04/2021   • At risk for falls 02/04/2021   • Pneumonia of both lungs due to infectious organism 05/22/2022   • Acute respiratory failure with hypoxia ([facility]) 05/22/2022   • Rhinovirus infection 05/22/2022   • DNR (do not resuscitate) 05/22/2022   • Chronic systolic CHF (congestive heart failure) ([facility]) 05/28/2022   • NSTEMI (non-ST elevated myocardial infarction) ([facility]) 05/28/2022     Resolved Ambulatory Problems     Diagnosis Date Noted   • Small bowel obstruction  ([facility]) 12/09/2018   • Leukocytosis 12/09/2018   • Metabolic encephalopathy 12/12/2018     Past Medical History:   Diagnosis Date   • Anemia    • 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HCC)    • Arthritis    • Atypical chest pain    • Blind right eye    • Chronic kidney disease (CKD), stage V (HCC)    • Difficulty walking    • Dizziness    • Dyslipidemia    • Esophageal reflux    • Esophagitis, reflux    • Femur fracture, right ([facility])    • HL (hearing loss)    • Macular degeneration    • Malignant neoplasm of prostate ([facility])    • Memory loss    • Nephrolithiasis    • Nonspecific abnormal finding    • Paroxysmal atrial fibrillation ([facility])    • Postnasal drip    • Premature ventricular contractions    • Renal disease    • Throat pain    • Urge incontinence of urine          PAST SURGICAL HISTORY  Past Surgical History:   Procedure Laterality Date   • ARTERIOVENOUS FISTULA Left 2015   • HAND SURGERY Bilateral    • HIP SURGERY     • INGUINAL HERNIA REPAIR     • KNEE SURGERY     • OTHER SURGICAL HISTORY      cardiac cath procedure summary normal, corneal lasik   • REPLACEMENT TOTAL KNEE      left    • SHOULDER SURGERY     • SKIN CANCER EXCISION     • TONSILLECTOMY AND ADENOIDECTOMY           FAMILY HISTORY  Family History   Problem Relation Age of Onset   • Other Brother         acute bacterial endocarditis         SOCIAL HISTORY  Social History     Socioeconomic History   • Marital status:    Tobacco Use   • Smoking status: Former Smoker   • Smokeless tobacco: Never Used   • Tobacco comment: denies caffeine use   Vaping Use   • Vaping Use: Never used   Substance and Sexual Activity   • Alcohol use: No   • Drug use: No   • Sexual activity: Defer         ALLERGIES  Amiodarone and Nitrofurantoin        REVIEW OF SYSTEMS  Review of Systems   Unable to perform ROS: Dementia   Constitutional: Positive for fatigue.   Neurological: Positive for weakness.        Altered, speech is slurred          PHYSICAL EXAM    I have reviewed the triage  vital signs and nursing notes.    ED Triage Vitals   Temp Heart Rate Resp BP SpO2   07/04/22 1132 07/04/22 1130 07/04/22 1130 07/04/22 1130 07/04/22 1130   98.6 °F (37 °C) 120 18 138/70 96 %      Temp src Heart Rate Source Patient Position BP Location FiO2 (%)   07/04/22 1132 07/04/22 1130 -- -- --   Tympanic Monitor          Physical Exam   Constitutional: Pt. is awake.  He will look at me when I call his name and answer simple yes or no questions..   HENT: Normocephalic and atraumatic.   Neck: Normal range of motion. Neck supple. No JVD present.   Cardiovascular: Irregularly irregular, rate is normal.  Pulmonary/Chest: Effort normal and breath sounds normal. No stridor. No respiratory distress. No wheezes, no rales.   Abdominal: Soft. Bowel sounds are normal. No distension. There is no tenderness. There is no rebound and no guarding.   Musculoskeletal: Normal range of motion.  Trace bilateral lower extremity edema, no tenderness or deformity.   Neurological: Pt. is awake, he will look at me when I call his name and answer simple yes or no questions.  Face is symmetric.  He is drooling similar.   strength is symmetric and strong.  Skin: Skin is warm and dry. No rash noted. Pt. is not diaphoretic. No erythema.   Psychiatric: Unable to assess   nursing note and vitals reviewed.        LAB RESULTS  Recent Results (from the past 24 hour(s))   Comprehensive Metabolic Panel    Collection Time: 07/04/22 11:58 AM    Specimen: Blood   Result Value Ref Range    Glucose 124 (H) 65 - 99 mg/dL    BUN 43 (H) 8 - 23 mg/dL    Creatinine 6.37 (H) 0.76 - 1.27 mg/dL    Sodium 141 136 - 145 mmol/L    Potassium 5.3 (H) 3.5 - 5.2 mmol/L    Chloride 100 98 - 107 mmol/L    CO2 25.1 22.0 - 29.0 mmol/L    Calcium 9.9 (H) 8.2 - 9.6 mg/dL    Total Protein 6.8 6.0 - 8.5 g/dL    Albumin 3.80 3.50 - 5.20 g/dL    ALT (SGPT) 12 1 - 41 U/L    AST (SGOT) 17 1 - 40 U/L    Alkaline Phosphatase 114 39 - 117 U/L    Total Bilirubin 0.5 0.0 - 1.2  mg/dL    Globulin 3.0 gm/dL    A/G Ratio 1.3 g/dL    BUN/Creatinine Ratio 6.8 (L) 7.0 - 25.0    Anion Gap 15.9 (H) 5.0 - 15.0 mmol/L    eGFR 7.7 (L) >60.0 mL/min/1.73   CBC Auto Differential    Collection Time: 07/04/22 11:58 AM    Specimen: Blood   Result Value Ref Range    WBC 14.40 (H) 3.40 - 10.80 10*3/mm3    RBC 2.94 (L) 4.14 - 5.80 10*6/mm3    Hemoglobin 9.2 (L) 13.0 - 17.7 g/dL    Hematocrit 28.9 (L) 37.5 - 51.0 %    MCV 98.3 (H) 79.0 - 97.0 fL    MCH 31.3 26.6 - 33.0 pg    MCHC 31.8 31.5 - 35.7 g/dL    RDW 16.6 (H) 12.3 - 15.4 %    RDW-SD 59.2 (H) 37.0 - 54.0 fl    MPV 10.1 6.0 - 12.0 fL    Platelets 180 140 - 450 10*3/mm3    Neutrophil % 88.9 (H) 42.7 - 76.0 %    Lymphocyte % 3.9 (L) 19.6 - 45.3 %    Monocyte % 6.3 5.0 - 12.0 %    Eosinophil % 0.3 0.3 - 6.2 %    Basophil % 0.3 0.0 - 1.5 %    Immature Grans % 0.3 0.0 - 0.5 %    Neutrophils, Absolute 12.82 (H) 1.70 - 7.00 10*3/mm3    Lymphocytes, Absolute 0.56 (L) 0.70 - 3.10 10*3/mm3    Monocytes, Absolute 0.90 0.10 - 0.90 10*3/mm3    Eosinophils, Absolute 0.04 0.00 - 0.40 10*3/mm3    Basophils, Absolute 0.04 0.00 - 0.20 10*3/mm3    Immature Grans, Absolute 0.04 0.00 - 0.05 10*3/mm3    nRBC 0.0 0.0 - 0.2 /100 WBC   Protime-INR    Collection Time: 07/04/22 11:58 AM    Specimen: Blood   Result Value Ref Range    Protime 16.8 (H) 11.7 - 14.2 Seconds    INR 1.39 (H) 0.90 - 1.10   aPTT    Collection Time: 07/04/22 11:58 AM    Specimen: Blood   Result Value Ref Range    PTT 51.4 (H) 22.7 - 35.4 seconds       Ordered the above labs and independently reviewed the results.        RADIOLOGY  CT Head Without Contrast    Result Date: 7/4/2022  CT HEAD WITHOUT CONTRAST  HISTORY: Altered mental status.  TECHNIQUE:  Head CT includes axial imaging from the base of skull to the vertex without intravenous contrast. Radiation dose reduction techniques were utilized, including automated exposure control and exposure modulation based on body size.  COMPARISON:CT head 07/02/2022,  05/22/2022, 07/21/2020.  FINDINGS: There is increased CSF density space surrounding both cerebral hemispheres greatest adjacent to the frontal and parietal lobes but also extending adjacent to the temporal lobes. These are consistent with chronic bilateral subdural hygromas. When compared to the head CT 2 days ago, there have developed increased curvilinear areas of increased density within the subdural collections. This is greatest adjacent to the posterolateral superior right frontal lobe and right parietal lobe. This may be associated with increased vascularity or small areas of minimal hemorrhage into subdural hygromas and short internal follow-up evaluation is recommended. There is also subtle increased density within the component of subdural hygroma in the left temporal region as well as anteromedial to the left frontal lobe.  There is no evidence for intraventricular hemorrhage. There are no abnormal areas of increased attenuation intraaxially to suggest intraparenchymal hemorrhage. There is a small focus of cortical parenchymal dystrophic calcification within the posteromedial-superior right frontal lobe and this is without change.  Intracranial atherosclerotic calcifications are present involving the cavernous and supracavernous segments of both internal carotid arteries and the intracranial vertebral arteries.  There is moderate left maxillary mucosal thickening similar to the previous head CT. Bone windows demonstrate no calvarial fracture.      Bilateral cerebral convexity subdural hygromas are similar in size to the head CT 2 days ago and previous head CT 05/22/2022. Hygromas now contain small curvilinear areas of increased density that could in part represent vascularity though appear new since the previous exam and are suspicious for small areas of hemorrhage into subdural hygromas such as best demonstrated posterolateral to the superior right frontal lobe and in the left temporal region. Short  interval follow-up head CT is recommended.  Discussed with Dr. Bhatia in the emergency department 07/04/2022 at 12:40 PM.  This report was finalized on 7/4/2022 1:02 PM by Dr. Norris Grullon M.D.      XR Chest 1 View    Result Date: 7/4/2022  CHEST SINGLE VIEW  HISTORY: Altered mental status. Extremity weakness and speech problem.  COMPARISON: AP chest 05/22/2022.  FINDINGS: Heart size is enlarged. Aortic vascular calcifications are present. There are small bilateral pleural effusions associated with hazy basilar opacities. There is also basilar atelectasis. Old healed right posterior rib fractures are present. Aortic vascular calcifications are noted. There is an anchor within the right humeral head related to previous rotator cuff repair. The bones are osteopenic.      1. Small bilateral pleural effusions with associated basilar atelectasis. Cardiomegaly. 2. Osteopenia. Old healed right posterior rib fractures. Previous right rotator cuff repair.  This report was finalized on 7/4/2022 1:01 PM by Dr. Norris Grullon M.D.        I ordered the above noted radiological studies. Reviewed by me and discussed with radiologist.  See dictation for official radiology interpretation.      PROCEDURES    Procedures      MEDICATIONS GIVEN IN ER    Medications   sodium chloride 0.9 % flush 10 mL (has no administration in time range)         PROGRESS, DATA ANALYSIS, CONSULTS, AND MEDICAL DECISION MAKING    Any/all labs have been independently reviewed by me.  Any/all radiology studies have been reviewed by me and discussed with radiologist dictating the report.   EKG's independently viewed and interpreted by me.  Discussion below represents my analysis of pertinent findings related to patient's condition, differential diagnosis, treatment plan and final disposition.    Number of Diagnoses or Management Options     Amount and/or Complexity of Data Reviewed  Clinical lab tests:  Yes  Tests in the radiology section of CPT®:   Yes  Tests in the medicine section of CPT®:  Yes  Review and summarize past medical records:  (Yes-see HPI)  Independent visualization of images, tracings, or specimens: (Yes-see below)      ED Course as of 07/04/22 1733   Mon Jul 04, 2022   1140 Patient is not a TNK candidate-he is on Xarelto.  Also, onset of symptoms is unknown, and he has had recent head trauma. [WC]   1159 Prior record review-patient was evaluated in this emergency department on 7/2/2022 for a fall.  CT of the brain showed no acute hemorrhages.  He did have a left inferior orbital blowout fracture without entrapment. [WC]   1248 CT of the brain was independently visualized by me and discussed with/interpreted by Dr. Grullon (radiology)-there are small curvilinear areas of increased density and chronic subdural hygromas-these are new when compared to 2 days ago.  For official interpretation, see dictated report. [WC]   1254 Chest x-ray independently visualized by me and interpreted by/discussed with Dr. Grullon (radiology)-there are small bilateral pleural effusions with bibasilar atelectasis and cardiomegaly.  For official interpretation, see dictated report. [WC]   1254 BUN(!): 43 [WC]   1254 Creatinine(!): 6.37 [WC]   1254 Potassium(!): 5.3 [WC]   1254 Sodium: 141 [WC]   1311 Discussed with Dr. Hilton (neurosurgery)-he agrees to see the patient in consultation.  No indication for ICU admission [WC]   1339 Case discussed with Dr. Antoine ([facility])-he agrees to admit the patient to telemetry bed. [WC]   1404 Discussed with Dr. Pimentel (nephrology)-he agrees to face consultation. [WC]      ED Course User Index  [WC] Phillip Bhatia MD       AS OF 17:33 EDT VITALS:    BP - 106/64  HR - 90  TEMP - 98.6 °F (37 °C) (Tympanic)  02 SATS - 96%        DIAGNOSIS  Final diagnoses:   Altered mental status, unspecified altered mental status type   Bilateral subdural hematomas (HCC)   ESRD (end stage renal disease) on dialysis (HCC)          DISPOSITION  Admitted-telemetry           Phillip Bhatia MD  07/04/22 8193

## 2022-07-04 NOTE — ED TRIAGE NOTES
Last known well before bed (time unknown).  This am  family noted left sided weakness and slurred speech c confusion.  Pt has dementia.  Pt is on RA at baseline    Patient was placed in face mask during first look triage.  Patient was wearing a face mask throughout encounter.  I wore personal protective equipment throughout the encounter.  Hand hygiene was performed before and after patient encounter.

## 2022-07-04 NOTE — CONSULTS
[facility] Consult Note      Patient Name: Shaun Brwester  : 1930  MRN: [id number]  Primary Care Physician:  Andrew Trujillo MD  Referring Physician: Hussain Antoine MD  Date of admission: 2022    Subjective     Reason for Consult: ESRD    HPI:   Shaun Brewster is a 89 y.o. male was admitted after he was found to have hygroma bilaterally and evidence of a probable acute bleed he had a fall 2 days ago with blown out fracture of the left orbit, the patient has been confused since he left the skilled nursing facility approximately a week earlier he had a fall 2 days ago.  He has been declining gradually since his most recent hospitalization in late May 2022 and discharged on 6/3/2022  The patient has severe Alzheimer's dementia, he has yet end-stage renal disease on maintenance hemodialysis via left upper extremity AV fistula he undergoes dialysis every Monday, Wednesday and Friday, he has history of hypertension, atrial fibrillation, anemia and dyslipidemia.    Patient is obtunded unable to obtain any history from the patient or review of systems    Review of Systems:   Not obtainable    Personal History     Past Medical History:   Diagnosis Date   • Anemia    • 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HCC)    • Arthritis    • Atypical chest pain    • Blind right eye    • Chronic kidney disease (CKD), stage V (HCC)    • Difficulty walking    • Dizziness    • Dyslipidemia    • Esophageal reflux    • Esophagitis, reflux    • Femur fracture, right (HCC)    • HL (hearing loss)    • Hypertension    • Macular degeneration    • Malignant neoplasm of prostate (HCC)     gland   • Memory loss    • Nephrolithiasis    • Nonspecific abnormal finding    • Paroxysmal atrial fibrillation (HCC)    • Postnasal drip    • Premature ventricular contractions    • Renal disease    • Throat pain    • Urge incontinence of urine    • Ventricular tachycardia (HCC)        Past Surgical History:   Procedure Laterality  Date   • ARTERIOVENOUS FISTULA Left 2015   • HAND SURGERY Bilateral    • HIP SURGERY     • INGUINAL HERNIA REPAIR     • KNEE SURGERY     • OTHER SURGICAL HISTORY      cardiac cath procedure summary normal, corneal lasik   • REPLACEMENT TOTAL KNEE      left    • SHOULDER SURGERY     • SKIN CANCER EXCISION     • TONSILLECTOMY AND ADENOIDECTOMY         Family History: family history includes Other in his brother.    Social History:  reports that he has quit smoking. He has never used smokeless tobacco. He reports that he does not drink alcohol and does not use drugs.    Home Medications:  Prior to Admission medications    Medication Sig Start Date End Date Taking? Authorizing Provider   acetaminophen (TYLENOL) 325 MG tablet Take 2 tablets by mouth Every 4 (Four) Hours As Needed for Mild Pain . 6/3/22   Velasquez Vidal MD   Azelastine-Fluticasone 137-50 MCG/ACT suspension 2 sprays each nostril bid 5/10/22   Andrew Trujillo MD   benzonatate (TESSALON) 100 MG capsule Take 1 capsule by mouth 3 (Three) Times a Day As Needed for Cough. 6/3/22   Velasquez Vidal MD   carvedilol (COREG) 6.25 MG tablet Take 1 tablet by mouth 2 (Two) Times a Day With Meals. 6/3/22   Velasquez Vidal MD   Cyanocobalamin (Vitamin B-12) 1000 MCG sublingual tablet Place  under the tongue Daily.    Ara Ngo MD   donepezil (ARICEPT) 10 MG tablet Take 10 mg by mouth Every Night. 1/1/22   Ara Ngo MD   donepezil ODT (ARICEPT ODT) 10 MG disintegrating tablet Place 1 tablet on the tongue Every Night for 90 days. 5/11/21 8/9/21  Mike López II, MD   famotidine (PEPCID) 40 MG tablet Take 1 tablet by mouth Daily. 3/28/22   Andrew Trujillo MD   Fexofenadine HCl (MUCINEX ALLERGY PO) Take  by mouth 2 (Two) Times a Day.    Ara Ngo MD   finasteride (PROSCAR) 5 MG tablet TAKE 1 TABLET DAILY 8/2/21   Andrew Trujillo MD   guaifenesin (ROBITUSSIN) 100 MG/5ML liquid Take 10 mL by mouth  Every Night. 6/3/22   Velasquez Vidal MD   lidocaine-prilocaine (EMLA) 2.5-2.5 % cream APPLY SMALL AMOUNT TO ACCESS SITE (AVF) 1 HOUR BEFORE DIALYSIS. COVER WITH OCCLUSIVE DRESSING (SARAN WRAP) 1/19/21   Ara Ngo MD   Multiple Vitamins-Minerals (PRESERVISION AREDS PO) Take  by mouth 2 (Two) Times a Day.    Ara Ngo MD   multivitamin (THERAGRAN) tablet tablet Take  by mouth Daily.    Ara Ngo MD   polyethylene glycol (MIRALAX) packet Take 17 g by mouth Every 3 (Three) Days. 12/24/18   Kris Davila MD   QUEtiapine (SEROquel) 25 MG tablet TAKE 1 TABLET NIGHTLY 4/6/22   Munira Catherine APRN   RENVELA 800 MG tablet Take 800 mg by mouth 3 (Three) Times a Day. Take 3 tablets x3 daily 5/13/20   Ara Ngo MD   rivaroxaban (Xarelto) 15 MG tablet Take 1 tablet by mouth Daily. Take one tablet my mouth on Monday, Wednesday, Friday and Saturday  Patient taking differently: Take 15 mg by mouth Daily. Take one tablet my mouth on Monday, Wednesday, Friday and Saturday 11/15/21   Andrew Trujillo MD       Allergies:  Allergies   Allergen Reactions   • Amiodarone Dizziness   • Nitrofurantoin Unknown - Low Severity       Objective     Vitals:   Temp:  [98.6 °F (37 °C)] 98.6 °F (37 °C)  Heart Rate:  [] 90  Resp:  [18] 18  BP: (106-142)/(64-95) 106/64  Flow (L/min):  [2] 2  No intake or output data in the 24 hours ending 07/04/22 1630    Physical Exam:   Constitutional: Chronically ill, obtunded, not responsive  HEENT: Sclera anicteric, no conjunctival injection, oral mucosa is dry, he has emesis around his left orbit  Neck:  No thyromegaly, no lymphadenopathy, trachea at midline, no JVD  Respiratory: Bilateral rhonchi, nonlabored respiration  Cardiovascular: Irregularly irregular, no murmurs, no rubs or gallops, no carotid bruit  Gastrointestinal: Positive bowel sounds, abdomen is soft, nontender and nondistended  : No palpable bladder  Musculoskeletal:  2+ pretibial and pedal edema, no clubbing or cyanosis, functional AV fistula in the left upper arm with good thrill and bruit  Psychiatric: Unable to assess  Neurologic: Unable to assess  Skin: Warm and dry       Scheduled Meds:        IV Meds:        Results Reviewed:   I have personally reviewed the results from the time of this admission to 7/4/2022 16:30 EDT     Lab Results   Component Value Date    GLUCOSE 124 (H) 07/04/2022    CALCIUM 9.9 (H) 07/04/2022     07/04/2022    K 5.3 (H) 07/04/2022    CO2 25.1 07/04/2022     07/04/2022    BUN 43 (H) 07/04/2022    CREATININE 6.37 (H) 07/04/2022    EGFRIFAFRI  12/23/2018      Comment:      <15 Indicative of kidney failure.    EGFRIFNONA 10 (L) 12/23/2018    BCR 6.8 (L) 07/04/2022    ANIONGAP 15.9 (H) 07/04/2022      Lab Results   Component Value Date    MG 2.3 04/21/2022    PHOS 5.8 (H) 05/30/2022    ALBUMIN 3.80 07/04/2022           Assessment / Plan     ASSESSMENT:  1.  End-stage renal disease on maintenance hemodialysis every Monday, Wednesday and Friday he did not have dialysis earlier today as he had altered mental status by and as per the family brought him in for evaluation.,  Patient had evidence of fluid excess and potassium is 5.3.  2.  Bilateral hygroma with some vascularization may be suggestive of an acute bleed after recent fall 2 days earlier  3.  Alzheimer dementia with significant altered mental status has been declining for the past week even before recent fall.  4.  Chronic A. Fib  5.  Anemia of chronic kidney disease, hemoglobin today 9.2    PLAN:  1.  I had a long discussion with the patient's family they are not ready for palliative care also the discussion took place in the emergency department and Dr. Hilton with neurosurgery was present and he gave his opinion as will be noted in his consult.  At this point the family wants dialysis he would be definitely DNR and they will make a decision in the next few days about comfort measures  versus continuing the same course.  2.  Prognosis very poor  3.  Surveillance labs.    Thank you for involving us in the care of Shaun Brewster.  Please feel free to call with any questions.    Dimitry Pimentel MD  07/04/22  16:30 EDT    [facility]  [phone]      Much of this encounter note is an electronic transcription/translation of spoken language to printed text. The electronic translation of spoken language may permit erroneous, or at times, nonsensical words or phrases to be inadvertently transcribed; Although I have reviewed the note for such errors, some may still exist.

## 2022-07-04 NOTE — PLAN OF CARE
Goal Outcome Evaluation:      Attempt to complete Bedside Swallow, patient not alert for PO intake despite max verbal/tactile cueing.  Reattempt next date.  RN notified.

## 2022-07-04 NOTE — H&P
Internal medicine history and physical  INTERNAL MEDICINE   [facility]       Patient Identification:  Name: Shaun Brewster  Age: 89 y.o.  Sex: male  :  1930  MRN: [id number]                   Primary Care Physician: Andrew Trujillo MD                               Date of admission:2022    Chief Complaint: Brought to the emergency room for evaluation of declining functional status following a recent fall.    History of Present Illness:   Patient is a 89-year-old male with underlying dementia, end-stage renal disease on hemodialysis, chronic atrial fibrillation on anticoagulation therapy as well as vision issues due to underlying macular degeneration and loss of vision in the right eye who lives by himself and cared for by caregivers noted on 2022 to be on the floor with bruising around his left eye.  Patient was evaluated in the emergency room on 2022 and work-up did not reveal any acute intracranial pathology or bleeding.  Patient has evidence of chronic hygromas on the CAT scan.  His Xarelto was held and patient was considered stable enough to be discharged back to his home environment.  Patient has tendency to fall and in fact he was in the hospital about 6 weeks ago for fracture of his hip which is improving and pain and discomfort is better.  He was in the hospital for 12 days at that time and had multiple issues.  Since his visit to the emergency room on 2022 patient has been increasingly lethargic confused and his speech has become garbled.  Has not taken his Xarelto since his last visit to the emergency room 48 hours ago.  Work-up in the emergency room revealed 2 new areas of increased density in the CT scan of the head concerning for traumatic subdural hematoma.  Patient case was discussed with neurosurgery on-call by the ER provider and hospital admission for observation of his neurological status was recommended with continued withholding of his  anticoagulation therapy but was not considered to be a candidate to be admitted to ICU.    Patient himself is unable to give much account of his symptoms.  Past Medical History:  Past Medical History:   Diagnosis Date   • Anemia    • 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HCC)    • Arthritis    • Atypical chest pain    • Blind right eye    • Chronic kidney disease (CKD), stage V (HCC)    • Difficulty walking    • Dizziness    • Dyslipidemia    • Esophageal reflux    • Esophagitis, reflux    • Femur fracture, right (HCC)    • HL (hearing loss)    • Hypertension    • Macular degeneration    • Malignant neoplasm of prostate (HCC)     gland   • Memory loss    • Nephrolithiasis    • Nonspecific abnormal finding    • Paroxysmal atrial fibrillation (HCC)    • Postnasal drip    • Premature ventricular contractions    • Renal disease    • Throat pain    • Urge incontinence of urine    • Ventricular tachycardia (HCC)      Past Surgical History:  Past Surgical History:   Procedure Laterality Date   • ARTERIOVENOUS FISTULA Left 2015   • HAND SURGERY Bilateral    • HIP SURGERY     • INGUINAL HERNIA REPAIR     • KNEE SURGERY     • OTHER SURGICAL HISTORY      cardiac cath procedure summary normal, corneal lasik   • REPLACEMENT TOTAL KNEE      left    • SHOULDER SURGERY     • SKIN CANCER EXCISION     • TONSILLECTOMY AND ADENOIDECTOMY        Home Meds:  Medications Prior to Admission   Medication Sig Dispense Refill Last Dose   • acetaminophen (TYLENOL) 325 MG tablet Take 2 tablets by mouth Every 4 (Four) Hours As Needed for Mild Pain .   7/3/2022 at Unknown time   • benzonatate (TESSALON) 100 MG capsule Take 1 capsule by mouth 3 (Three) Times a Day As Needed for Cough.   Past Week at Unknown time   • carvedilol (COREG) 6.25 MG tablet Take 1 tablet by mouth 2 (Two) Times a Day With Meals.   7/3/2022 at Unknown time   • donepezil (ARICEPT) 10 MG tablet Take 10 mg by mouth Every Night.   7/3/2022 at Unknown time   • famotidine (PEPCID) 40 MG tablet  Take 1 tablet by mouth Daily. 90 tablet 3 7/3/2022 at Unknown time   • finasteride (PROSCAR) 5 MG tablet TAKE 1 TABLET DAILY 90 tablet 3 7/3/2022 at Unknown time   • guaiFENesin (MUCINEX) 600 MG 12 hr tablet Take 1,200 mg by mouth 2 (Two) Times a Day.   7/3/2022 at Unknown time   • guaifenesin (ROBITUSSIN) 100 MG/5ML liquid Take 10 mL by mouth Every Night. (Patient taking differently: Take 200 mg by mouth At Night As Needed.)   Past Week at Unknown time   • lidocaine-prilocaine (EMLA) 2.5-2.5 % cream APPLY SMALL AMOUNT TO ACCESS SITE (AVF) 1 HOUR BEFORE DIALYSIS. COVER WITH OCCLUSIVE DRESSING (SARAN WRAP)   Past Week at Unknown time   • multivitamin (THERAGRAN) tablet tablet Take  by mouth Daily.   7/3/2022 at Unknown time   • polyethylene glycol (MIRALAX) packet Take 17 g by mouth Every 3 (Three) Days.   Past Week at Unknown time   • QUEtiapine (SEROquel) 25 MG tablet TAKE 1 TABLET NIGHTLY 90 tablet 1 7/3/2022 at Unknown time   • RENVELA 800 MG tablet Take 800 mg by mouth 3 (Three) Times a Day. Take 3 tablets x3 daily   7/3/2022 at Unknown time   • rivaroxaban (Xarelto) 15 MG tablet Take 1 tablet by mouth Daily. Take one tablet my mouth on Monday, Wednesday, Friday and Saturday (Patient taking differently: Take 15 mg by mouth Daily. Take one tablet my mouth on Monday, Wednesday, Friday and Saturday) 90 tablet 3 Past Week at Unknown time   • Azelastine-Fluticasone 137-50 MCG/ACT suspension 2 sprays each nostril bid 23 g 5 Unknown at Unknown time   • Cyanocobalamin (Vitamin B-12) 1000 MCG sublingual tablet Place  under the tongue Daily.      • donepezil ODT (ARICEPT ODT) 10 MG disintegrating tablet Place 1 tablet on the tongue Every Night for 90 days. 90 tablet 3      Current Meds:     Current Facility-Administered Medications:   •  [COMPLETED] Insert peripheral IV, , , Once **AND** sodium chloride 0.9 % flush 10 mL, 10 mL, Intravenous, PRN, Phillip Bhatia MD  Allergies:  Allergies   Allergen Reactions   •  "Amiodarone Dizziness   • Nitrofurantoin Unknown - Low Severity     Social History:   Social History     Tobacco Use   • Smoking status: Former Smoker   • Smokeless tobacco: Never Used   • Tobacco comment: denies caffeine use   Substance Use Topics   • Alcohol use: No      Family History:  Family History   Problem Relation Age of Onset   • Other Brother         acute bacterial endocarditis          Review of Systems  See history of present illness and past medical history.   Remainder of ROS is negative.      Vitals:   /64   Pulse 90   Temp 98.6 °F (37 °C) (Tympanic)   Resp 18   Ht 188 cm (74\")   Wt 76.2 kg (168 lb)   SpO2 96%   BMI 21.57 kg/m²   I/O: No intake or output data in the 24 hours ending 07/04/22 1921  Exam:  Patient is examined using the personal protective equipment as per guidelines from infection control for this particular patient as enacted.  Hand washing was performed before and after patient interaction.  General Appearance:   Awake mumbles does not engage in information  And does not appear to be in any acute distress.   Head:    Normocephalic, without obvious abnormality, left periorbital bruising noted.   Eyes:    PERRL, conjunctiva/corneas clear, EOM's intact, both eyes   Ears:    Normal external ear canals, both ears   Nose:   Nares normal, septum midline, mucosa normal, no drainage    or sinus tenderness   Throat:   Lips, tongue, gums normal; oral mucosa pink and moist   Neck:   Supple, symmetrical, trachea midline, no adenopathy;     thyroid:  no enlargement/tenderness/nodules; no carotid    bruit or JVD   Back:     Symmetric, no curvature, ROM normal, no CVA tenderness   Lungs:     Clear to auscultation bilaterally, respirations unlabored   Chest Wall:    No tenderness or deformity    Heart:   S1-S2 irregularly irregular.   Abdomen:    Soft nontender   Extremities:  Chronic changes noted   Pulses:   Pulses palpable in all extremities; symmetric all extremities   Skin:  " Ecchymotic changes in various areas in different stages of resolution   Neurologic:  Looks around but does not engage.       Data Review:      I reviewed the patient's new clinical results.  Results from last 7 days   Lab Units 07/04/22  1158   WBC 10*3/mm3 14.40*   HEMOGLOBIN g/dL 9.2*   PLATELETS 10*3/mm3 180     Results from last 7 days   Lab Units 07/04/22  1158   SODIUM mmol/L 141   POTASSIUM mmol/L 5.3*   CHLORIDE mmol/L 100   CO2 mmol/L 25.1   BUN mg/dL 43*   CREATININE mg/dL 6.37*   CALCIUM mg/dL 9.9*   GLUCOSE mg/dL 124*     XR Wrist 3+ View Right    Result Date: 7/2/2022   As described.    This report was finalized on 7/2/2022 11:12 AM by Dr. Mike Hernandez M.D.      CT Head Without Contrast    Result Date: 7/4/2022  Bilateral cerebral convexity subdural hygromas are similar in size to the head CT 2 days ago and previous head CT 05/22/2022. Hygromas now contain small curvilinear areas of increased density that could in part represent vascularity though appear new since the previous exam and are suspicious for small areas of hemorrhage into subdural hygromas such as best demonstrated posterolateral to the superior right frontal lobe and in the left temporal region. Short interval follow-up head CT is recommended.  Discussed with Dr. Bhatia in the emergency department 07/04/2022 at 12:40 PM.  This report was finalized on 7/4/2022 1:02 PM by Dr. Norris Grullon M.D.      CT Head Without Contrast    Result Date: 7/2/2022  1. Left inferior orbital wall blowout fracture. No muscular entrapment. No other facial fractures identified. Fluid level in the left maxillary sinus. 2. No acute intracranial hemorrhage or hydrocephalus; chronic changes of the brain. No acute cervical fracture identified (limited by motion artifact); degenerative changes in cervical spine. If there is further clinical concern, MRI could be considered for further evaluation. 3. Incidental findings, as noted above.  This report was  finalized on 7/2/2022 12:22 PM by Dr. Mike Hernandez M.D.      CT Cervical Spine Without Contrast    Result Date: 7/2/2022  1. Left inferior orbital wall blowout fracture. No muscular entrapment. No other facial fractures identified. Fluid level in the left maxillary sinus. 2. No acute intracranial hemorrhage or hydrocephalus; chronic changes of the brain. No acute cervical fracture identified (limited by motion artifact); degenerative changes in cervical spine. If there is further clinical concern, MRI could be considered for further evaluation. 3. Incidental findings, as noted above.  This report was finalized on 7/2/2022 12:22 PM by Dr. Mike Hernandez M.D.      XR Chest 1 View    Result Date: 7/4/2022  1. Small bilateral pleural effusions with associated basilar atelectasis. Cardiomegaly. 2. Osteopenia. Old healed right posterior rib fractures. Previous right rotator cuff repair.  This report was finalized on 7/4/2022 1:01 PM by Dr. Norris Grullon M.D.      CT Facial Bones Without Contrast    Result Date: 7/2/2022  1. Left inferior orbital wall blowout fracture. No muscular entrapment. No other facial fractures identified. Fluid level in the left maxillary sinus. 2. No acute intracranial hemorrhage or hydrocephalus; chronic changes of the brain. No acute cervical fracture identified (limited by motion artifact); degenerative changes in cervical spine. If there is further clinical concern, MRI could be considered for further evaluation. 3. Incidental findings, as noted above.  This report was finalized on 7/2/2022 12:22 PM by Dr. Mike Hernandez M.D.      XR Hip With or Without Pelvis 2 - 3 View Left    Result Date: 7/2/2022   As described.    This report was finalized on 7/2/2022 1:24 PM by Dr. Mike Hernandez M.D.      No results found for: ACANTHNAEG, AFBCX, BPERTUSSISCX, BLOODCX  No results found for: BCIDPCR, CXREFLEX, CSFCX, CULTURETIS  No results found for: CULTURES, HSVCX, URCX  No results  found for: EYECULTURE, GCCX, HSVCULTURE, LABHSV  No results found for: LEGIONELLA, MRSACX, MUMPSCX, MYCOPLASCX  No results found for: NOCARDIACX, STOOLCX  No results found for: THROATCX, UNSTIMCULT, URINECX, CULTURE, VZVCULTUR  No results found for: VIRALCULTU, WOUNDCX.ekg  No orders to display     Microbiology Results (last 10 days)     Procedure Component Value - Date/Time    COVID PRE-OP / PRE-PROCEDURE SCREENING ORDER (NO ISOLATION) - Swab, Nasopharynx [[id number]]  (Normal) Collected: 07/04/22 1321    Lab Status: Final result Specimen: Swab from Nasopharynx Updated: 07/04/22 1804    Narrative:      The following orders were created for panel order COVID PRE-OP / PRE-PROCEDURE SCREENING ORDER (NO ISOLATION) - Swab, Nasopharynx.  Procedure                               Abnormality         Status                     ---------                               -----------         ------                     COVID-19,APTIMA PANTHER(...[[id number]]  Normal              Final result                 Please view results for these tests on the individual orders.    COVID-19,APTIMA PANTHER(JETT),[facility]DA, NP/OP SWAB IN UTM/VTM/SALINE TRANSPORT MEDIA,24 HR TAT - Swab, Nasopharynx [[id number]]  (Normal) Collected: 07/04/22 1321    Lab Status: Final result Specimen: Swab from Nasopharynx Updated: 07/04/22 1804     COVID19 Not Detected    Narrative:      Fact sheet for providers: https://www.fda.gov/media/221271/download     Fact sheet for patients: https://www.fda.gov/media/986428/download    Test performed by RT PCR.          Assessment:    Active Hospital Problems    Diagnosis  POA   • **Altered mental status [R41.82]  Yes   • Intracranial bleed (HCC) [I62.9]  Unknown   • Fall [W19.XXXA]  Unknown   • Hemodialysis patient (HCC) [Z99.2]  Not Applicable   • Chronic systolic CHF (congestive heart failure) (HCC) [I50.22]  Yes   • ESRD (end stage renal disease) (HCC) [N18.6]  Yes   • Hypertension [I10]  Yes   • Chronic atrial  fibrillation (HCC) [I48.20]  Yes       Plan: See admitting orders  Hold his anticoagulation therapy  Continue with neurochecks  Neurosurgery consultation  May benefit from cardiology consultation about safety of ongoing anticoagulation therapy with the risk of falls and obvious falls he is having now with injury  Nephrology consultation for continuation of dialysis  Palliative care consultation for goals of care to be discussed with family members    Hussain Antoine MD   7/4/2022  19:21 EDT  Much of this encounter note is an electronic transcription/translation of spoken language to printed text. The electronic translation of spoken language may permit erroneous, or at times, nonsensical words or phrases to be inadvertently transcribed; Although I have reviewed the note for such errors, some may still exist

## 2022-07-05 PROBLEM — R29.6 RECURRENT FALLS: Chronic | Status: ACTIVE | Noted: 2022-01-01

## 2022-07-05 PROBLEM — D63.8 ANEMIA OF CHRONIC DISEASE: Chronic | Status: ACTIVE | Noted: 2022-01-01

## 2022-07-05 PROBLEM — S02.32XA CLOSED BLOW-OUT FRACTURE OF LEFT ORBITAL FLOOR (HCC): Status: ACTIVE | Noted: 2022-01-01

## 2022-07-05 PROBLEM — Z87.81 HISTORY OF HIP FRACTURE: Chronic | Status: ACTIVE | Noted: 2022-01-01

## 2022-07-05 NOTE — NURSING NOTE
CWON note: attempted to see pt for evaluation of wounds POA, but pt was off the unit. Pt was found down at home after a fall, H&P noted, photos of right heel wound reviewed in chart. Wound care and prevention standing orders placed in Epic. Low air loss mattress was ordered from [facility] for adequate pressure redistribution. Please re-consult for any additional needs.

## 2022-07-05 NOTE — DISCHARGE PLACEMENT REQUEST
"Ruba Brewster (89 y.o. Male)             Date of Birth   05/29/1930    Social Security Number       Address   [address] [address]    Home Phone   [phone]    MRN   [id number]       Islam   [facility]    Marital Status                               Admission Date   7/4/22    Admission Type   Emergency    Admitting Provider   Hussain Antoine MD    Attending Provider   Ruba Posadas MD    Department, Room/Bed   [address], [address]       Discharge Date       Discharge Disposition       Discharge Destination                               Attending Provider: Ruba Posadas MD    Allergies: Amiodarone, Nitrofurantoin    Isolation: None   Infection: None   Code Status: No CPR   Advance Care Planning Activity    Ht: 188 cm (74\")   Wt: 76.2 kg (168 lb)    Admission Cmt: None   Principal Problem: Altered mental status [R41.82]                 Active Insurance as of 7/4/2022     Primary Coverage     Payor Plan Insurance Group Employer/Plan Group    MEDICARE MEDICARE A & B      Payor Plan Address Payor Plan Phone Number Payor Plan Fax Number Effective Dates    [address] [phone]  5/1/1995 - None Entered    [facility] [id number]       Subscriber Name Subscriber Birth Date Member ID       RUBA BREWSTER 5/29/1930 [id number]           Secondary Coverage     Payor Plan Insurance Group Employer/Plan Group    AARP  SUP AARP HEALTH CARE OPTIONS PLAN F     Payor Plan Address Payor Plan Phone Number Payor Plan Fax Number Effective Dates    [facility] [phone]  1/1/2015 - None Entered    [address]       [facility] [id number]       Subscriber Name Subscriber Birth Date Member ID       RUBA BREWSTER 5/29/1930 [id number]                 Emergency Contacts      (Rel.) Home Phone Work Phone Mobile Phone    cabreragrupo hobbs (Daughter) [phone] -- [phone]    Ruba Brewster (Son) [phone] -- [phone]              "

## 2022-07-05 NOTE — NURSING NOTE
HD complete, 3 liters removed. Post /61  T 97.5. Patient tolerated treatment well. Verbal report given.   Detail Level: Simple

## 2022-07-05 NOTE — PLAN OF CARE
Goal Outcome Evaluation:  Plan of Care Reviewed With: patient        Progress: no change  Outcome Evaluation: Swallow eval attempted. Son and daughter present. Patient was awake and alert prior to dialysis. Daughter reported patient was upgraded to regular and thins at rehab facility, but Son reported pt lost the ability to swallow following fall. Pt unable to wake with sternal rub, turning away from ice when presented to his lips. Continue NPO. Will follow up next date.    Patient was not wearing a face mask during this therapy encounter. Therapist used appropriate personal protective equipment including mask, eye protection and gloves.  Mask used was standard procedure mask. Appropriate PPE was worn during the entire therapy session. Hand hygiene was completed before and after therapy session. Patient is not in enhanced droplet precautions.

## 2022-07-05 NOTE — PROGRESS NOTES
[facility] Progress Note      Patient Name: Shaun Brewster  : 1930  MRN: [id number]  Primary Care Physician:  Andrew Trujillo MD  Date of admission: 2022    Subjective     Interval History:   Follow-up end-stage renal disease    Patient is obtunded, not answering any question, did not have his dialysis last night plan for dialysis this morning, no changes in his condition.    Review of Systems:   Not obtainable    Objective     Vitals:   Temp:  [97.4 °F (36.3 °C)-98.6 °F (37 °C)] 97.4 °F (36.3 °C)  Heart Rate:  [] 85  Resp:  [18-19] 18  BP: (106-142)/(64-95) 128/83  Flow (L/min):  [2-3] 3    Intake/Output Summary (Last 24 hours) at 2022 0857  Last data filed at 2022 0247  Gross per 24 hour   Intake 0 ml   Output --   Net 0 ml       Physical Exam:    General Appearance: Poorly responsive chronically ill does not appear to be in any acute distress  Skin: warm and dry  HEENT: oral mucosa dry, nonicteric sclera, right periorbital ecchymosis  Neck: Mild JVD  Lungs: Bilateral rhonchi and crackles, breathing effort not labored  Heart: Irregularly irregular, no rub  Abdomen: soft, no guarding, nondistended, normoactive bowels  : no palpable bladder  Extremities: 2+ pretibial and pedal edema, he has functional AV fistula in the left upper arm with good thrill and bruit  Neuro: Unable to assess    Scheduled Meds:     carvedilol, 6.25 mg, Oral, BID With Meals  donepezil, 10 mg, Oral, Nightly  famotidine, 40 mg, Oral, Daily  finasteride, 5 mg, Oral, Daily  multivitamin, 1 tablet, Oral, Daily  polyethylene glycol, 17 g, Oral, Q3 Days  QUEtiapine, 25 mg, Oral, Nightly  sevelamer, 800 mg, Oral, TID  sodium chloride, 10 mL, Intravenous, Q12H  vitamin B-12, 1,000 mcg, Oral, Daily      IV Meds:        Results Reviewed:   I have personally reviewed the results from the time of this admission to 2022 08:57 EDT     Results from last 7 days   Lab Units 22  1150    SODIUM mmol/L 141   POTASSIUM mmol/L 5.3*   CHLORIDE mmol/L 100   CO2 mmol/L 25.1   BUN mg/dL 43*   CREATININE mg/dL 6.37*   CALCIUM mg/dL 9.9*   BILIRUBIN mg/dL 0.5   ALK PHOS U/L 114   ALT (SGPT) U/L 12   AST (SGOT) U/L 17   GLUCOSE mg/dL 124*       Estimated Creatinine Clearance: 8 mL/min (A) (by C-G formula based on SCr of 6.37 mg/dL (H)).                Results from last 7 days   Lab Units 07/05/22  0804 07/04/22  1158   WBC 10*3/mm3 10.00 14.40*   HEMOGLOBIN g/dL 9.1* 9.2*   PLATELETS 10*3/mm3 166 180       Results from last 7 days   Lab Units 07/04/22  1158   INR  1.39*       Assessment / Plan     ASSESSMENT:  1.  End-stage renal disease on maintenance hemodialysis every Monday, Wednesday and Friday he did not have dialysis on Monday potassium this morning 5.6 and the patient is hypervolemic  2.  Bilateral hygroma with some vascularization may be suggestive of an acute bleed after recent fall in the past week  3.  Alzheimer dementia with significant altered mental status has been declining for the past week even before recent fall.  4.  Chronic A. Fib  5.  Anemia of chronic kidney disease, hemoglobin today 9.1    PLAN:  -Hemodialysis today   -Surveillance labs  -I had long discussion yesterday with the family about prognosis prognosis very poor but they are wanting to proceed with dialysis for the time being until they can make up their mind regarding palliative care.    Thank you for involving us in the care of Shaun Brewster.  Please feel free to call with any questions.    Dimitry Pimentel MD  07/05/22  08:57 EDT    [facility]  [phone]      Much of this encounter note is an electronic transcription/translation of spoken language to printed text. The electronic translation of spoken language may permit erroneous, or at times, nonsensical words or phrases to be inadvertently transcribed; Although I have reviewed the note for such errors, some may still exist.

## 2022-07-05 NOTE — PROGRESS NOTES
Name: Shaun Brewster ADMIT: 2022   : 1930  PCP: Andrew Trujillo MD    MRN: [id number] LOS: 1 days   AGE/SEX: 89 y.o. male  ROOM: [facility]     Subjective   Subjective   Pt will not open eyes or participate with history or exam.    Review of Systems   Unable to perform ROS: Patient unresponsive        Objective   Objective   Vital Signs  Temp:  [97.4 °F (36.3 °C)-98.3 °F (36.8 °C)] 98.2 °F (36.8 °C)  Heart Rate:  [] 88  Resp:  [18-24] 24  BP: (106-142)/(43-95) 134/43  SpO2:  [95 %-100 %] 96 %  on  Flow (L/min):  [2-3] 3;   Device (Oxygen Therapy): nasal cannula  Body mass index is 19.9 kg/m².  Physical Exam  Vitals and nursing note reviewed. Exam conducted with a chaperone present (Dialysis tech).   Constitutional:       General: He is not in acute distress.     Appearance: He is ill-appearing. He is not toxic-appearing or diaphoretic.      Comments: Obtunded    HENT:      Head: Normocephalic.      Comments: Ecchymosis around left eye     Nose: Nose normal.      Mouth/Throat:      Mouth: Mucous membranes are dry.      Pharynx: Oropharynx is clear.   Eyes:      General: No scleral icterus.        Right eye: No discharge.         Left eye: No discharge.      Extraocular Movements: Extraocular movements intact.      Conjunctiva/sclera: Conjunctivae normal.   Cardiovascular:      Rate and Rhythm: Normal rate. Rhythm irregular.      Pulses: Normal pulses.      Heart sounds: Murmur heard.      Comments: AVF LUE  Pulmonary:      Effort: Pulmonary effort is normal. No respiratory distress.      Breath sounds: No stridor. Rales present. No wheezing or rhonchi.   Abdominal:      General: Bowel sounds are normal. There is no distension.      Palpations: Abdomen is soft.   Musculoskeletal:         General: Swelling (1+ in BLEs) present. No deformity. Normal range of motion.      Cervical back: Neck supple. No rigidity.   Lymphadenopathy:      Cervical: No cervical adenopathy.   Skin:     General: Skin  is warm and dry.      Capillary Refill: Capillary refill takes less than 2 seconds.      Coloration: Skin is pale. Skin is not jaundiced.      Findings: No rash.   Neurological:      Comments: Unable to assess fully  Responds to voice but is non-verbal and will not open eyes or follow commands   Psychiatric:      Comments: Unable to assess         Results Review     I reviewed the patient's new clinical results.  Results from last 7 days   Lab Units 07/05/22  0804 07/04/22  1158   WBC 10*3/mm3 10.00 14.40*   HEMOGLOBIN g/dL 9.1* 9.2*   PLATELETS 10*3/mm3 166 180     Results from last 7 days   Lab Units 07/05/22  0804 07/04/22  1158   SODIUM mmol/L 141 141   POTASSIUM mmol/L 5.6* 5.3*   CHLORIDE mmol/L 99 100   CO2 mmol/L 24.0 25.1   BUN mg/dL 54* 43*   CREATININE mg/dL 7.28* 6.37*   GLUCOSE mg/dL 112* 124*   EGFR mL/min/1.73 6.5* 7.7*     Results from last 7 days   Lab Units 07/05/22  0804 07/04/22  1158   ALBUMIN g/dL 3.70 3.80   BILIRUBIN mg/dL 0.4 0.5   ALK PHOS U/L 99 114   AST (SGOT) U/L 12 17   ALT (SGPT) U/L 9 12     Results from last 7 days   Lab Units 07/05/22  0804 07/04/22  1158   CALCIUM mg/dL 9.7* 9.9*   ALBUMIN g/dL 3.70 3.80   PHOSPHORUS mg/dL 5.7*  --        Glucose   Date/Time Value Ref Range Status   07/05/2022 0540 134 (H) 70 - 130 mg/dL Final     Comment:     Meter: [id number] : [id number] Natasha WATTS       CT Head Without Contrast    Result Date: 7/4/2022  Bilateral cerebral convexity subdural hygromas are similar in size to the head CT 2 days ago and previous head CT 05/22/2022. Hygromas now contain small curvilinear areas of increased density that could in part represent vascularity though appear new since the previous exam and are suspicious for small areas of hemorrhage into subdural hygromas such as best demonstrated posterolateral to the superior right frontal lobe and in the left temporal region. Short interval follow-up head CT is recommended.  Discussed with Dr. Bhatia in the  emergency department 07/04/2022 at 12:40 PM.  This report was finalized on 7/4/2022 1:02 PM by Dr. Norris Grullon M.D.      XR Chest 1 View    Result Date: 7/4/2022  1. Small bilateral pleural effusions with associated basilar atelectasis. Cardiomegaly. 2. Osteopenia. Old healed right posterior rib fractures. Previous right rotator cuff repair.  This report was finalized on 7/4/2022 1:01 PM by Dr. Norris Grullon M.D.      Scheduled Medications  cadexomer iodine, 1 application, Topical, Daily  carvedilol, 6.25 mg, Oral, BID With Meals  donepezil, 10 mg, Oral, Nightly  famotidine, 40 mg, Oral, Daily  finasteride, 5 mg, Oral, Daily  multivitamin, 1 tablet, Oral, Daily  polyethylene glycol, 17 g, Oral, Q3 Days  QUEtiapine, 25 mg, Oral, Nightly  sevelamer, 800 mg, Oral, TID  sodium chloride, 10 mL, Intravenous, Q12H  vitamin B-12, 1,000 mcg, Oral, Daily    Infusions   Diet  NPO Diet NPO Type: Strict NPO       Assessment/Plan     Active Hospital Problems    Diagnosis  POA   • **Intracranial bleed ([facility]) [I62.9]  Yes   • Anemia of chronic disease [D63.8]  Yes   • Closed blow-out fracture of left orbital floor ([facility]) [S02.32XA]  Yes   • History of hip fracture, May 2022 [Z87.81]  Not Applicable   • Recurrent falls [R29.6]  Not Applicable   • Macular degeneration [H35.30]  Yes   • Malignant neoplasm of prostate ([facility]) [C61]  Yes   • Altered mental status [R41.82]  Yes   • Hemodialysis patient ([facility]) [Z99.2]  Not Applicable   • Chronic systolic CHF (congestive heart failure) ([facility]) [I50.22]  Yes   • DNR (do not resuscitate) [Z66]  Yes   • Late onset Alzheimer's disease without behavioral disturbance ([facility]) [G30.1, F02.80]  Yes   • ESRD (end stage renal disease) ([facility]) [N18.6]  Yes   • Hypertension [I10]  Yes   • Chronic atrial fibrillation ([facility]) [I48.20]  Yes      Resolved Hospital Problems   No resolved problems to display.       89yo gentleman with h/o recurrent falls at home (here for fall with right hip fracture in May, in  ER 7/2 for fall with left orbital fracture), who was brought to ER with report of increasing lethargy, confusion, and garbled speech since fall 7/2. Imaging 7/2 revealed chronic hygromas, but imaging 7/4 suggested new areas of hemorrhage.    Recurrent falls, ICH: KAMLESH to see, f/u CT head pending, no further AC    Alzheimer's dementia with acute confusion: Neuro has seen, MRI brain ordered, await their recs, continue Aricept and Seroquel    Recent right hip fracture 5/2022: conservative mgmt per Ortho, PT eval    Left orbital fracture: noted after fall 7/2, stable    Right heel wound: Wound RN has left LWC recommendations    PAF, chronic AC (Xarelto): no further AC in this gentleman--it is simply to risky, HRs fine on Coreg    ESRD, HyperK+: Renal is dialyzing, monitor K+    Anemia of CKD: Hgb stable at baseline, continue to monitor    HTN: BPs acceptable on Coreg, continue to monitor    H/o Prostate CA: stable      · SCDs for DVT prophylaxis.  · Limited code (no CPR, no intubation).  · Discussed with patient. No family present.  · Anticipate discharge to palliative care vs SNF vs home with HH or hospice, timing yet to be determined. Palliative Care consulted to discuss goals of care with family.      Shaun Posadas MD  [facility]  07/05/22  13:27 EDT

## 2022-07-05 NOTE — PLAN OF CARE
Goal Outcome Evaluation:              Outcome Evaluation: Pt SANCHEZ for dialysis this AM, not alert for PO last date. Will continue to follow.

## 2022-07-05 NOTE — PLAN OF CARE
Problem: Adult Inpatient Plan of Care  Goal: Plan of Care Review  Outcome: Ongoing, Progressing  Flowsheets (Taken 7/5/2022 1601)  Progress: no change  Plan of Care Reviewed With: patient  Outcome Evaluation: VSS, 2L NC, turn q2h, elevate heels, disoriented x4, dialysis completed today, NPO,  speech to reevaluate tomorrow, MRI ordered, palliative care to see to discuss goals of care.  Goal: Patient-Specific Goal (Individualized)  Outcome: Ongoing, Progressing  Goal: Absence of Hospital-Acquired Illness or Injury  Outcome: Ongoing, Progressing  Intervention: Identify and Manage Fall Risk  Recent Flowsheet Documentation  Taken 7/5/2022 1545 by Kianna Thompson RN  Safety Promotion/Fall Prevention:   clutter free environment maintained   assistive device/personal items within reach   activity supervised   fall prevention program maintained   nonskid shoes/slippers when out of bed   room organization consistent   safety round/check completed  Taken 7/5/2022 1422 by Kianna Thompson RN  Safety Promotion/Fall Prevention:   clutter free environment maintained   assistive device/personal items within reach   activity supervised   fall prevention program maintained   nonskid shoes/slippers when out of bed   room organization consistent   safety round/check completed  Taken 7/5/2022 1200 by Kianna Thompson RN  Safety Promotion/Fall Prevention: patient off unit  Taken 7/5/2022 0958 by Kianna Thompson RN  Safety Promotion/Fall Prevention: patient off unit  Taken 7/5/2022 0900 by Kianna Thompson RN  Safety Promotion/Fall Prevention: patient off unit  Taken 7/5/2022 0726 by Kianna Thompson RN  Safety Promotion/Fall Prevention:   clutter free environment maintained   assistive device/personal items within reach   activity supervised   fall prevention program maintained   nonskid shoes/slippers when out of bed   room organization consistent   safety round/check completed  Intervention: Prevent Skin  Injury  Recent Flowsheet Documentation  Taken 7/5/2022 1545 by Kianna Thompson RN  Body Position: (heels off bed)   turned   left   side-lying  Taken 7/5/2022 1422 by Kianna Thompson RN  Body Position: supine, legs elevated  Taken 7/5/2022 0726 by Kianna Thompson RN  Body Position: supine  Intervention: Prevent and Manage VTE (Venous Thromboembolism) Risk  Recent Flowsheet Documentation  Taken 7/5/2022 1545 by Kianna Thompson RN  Activity Management: activity adjusted per tolerance  Taken 7/5/2022 1422 by Kianna Thompson RN  Activity Management: activity adjusted per tolerance  VTE Prevention/Management:   bilateral   sequential compression devices on  Taken 7/5/2022 0726 by Kianna Thompson RN  Activity Management: activity adjusted per tolerance  Goal: Optimal Comfort and Wellbeing  Outcome: Ongoing, Progressing  Goal: Readiness for Transition of Care  Outcome: Ongoing, Progressing     Problem: Fall Injury Risk  Goal: Absence of Fall and Fall-Related Injury  Outcome: Ongoing, Progressing  Intervention: Promote Injury-Free Environment  Recent Flowsheet Documentation  Taken 7/5/2022 1545 by Kianna Thompson RN  Safety Promotion/Fall Prevention:   clutter free environment maintained   assistive device/personal items within reach   activity supervised   fall prevention program maintained   nonskid shoes/slippers when out of bed   room organization consistent   safety round/check completed  Taken 7/5/2022 1422 by Kianna Thompson RN  Safety Promotion/Fall Prevention:   clutter free environment maintained   assistive device/personal items within reach   activity supervised   fall prevention program maintained   nonskid shoes/slippers when out of bed   room organization consistent   safety round/check completed  Taken 7/5/2022 1200 by Kianna Thompson RN  Safety Promotion/Fall Prevention: patient off unit  Taken 7/5/2022 0958 by Kianna Thompson RN  Safety Promotion/Fall Prevention:  patient off unit  Taken 7/5/2022 0900 by Kianna Thompson RN  Safety Promotion/Fall Prevention: patient off unit  Taken 7/5/2022 0726 by Kianna Thompson RN  Safety Promotion/Fall Prevention:   clutter free environment maintained   assistive device/personal items within reach   activity supervised   fall prevention program maintained   nonskid shoes/slippers when out of bed   room organization consistent   safety round/check completed  Goal: Absence of Fall and Fall-Related Injury  Outcome: Ongoing, Progressing  Intervention: Promote Injury-Free Environment  Recent Flowsheet Documentation  Taken 7/5/2022 1545 by Kianna Thompson RN  Safety Promotion/Fall Prevention:   clutter free environment maintained   assistive device/personal items within reach   activity supervised   fall prevention program maintained   nonskid shoes/slippers when out of bed   room organization consistent   safety round/check completed  Taken 7/5/2022 1422 by Kianna Thompson RN  Safety Promotion/Fall Prevention:   clutter free environment maintained   assistive device/personal items within reach   activity supervised   fall prevention program maintained   nonskid shoes/slippers when out of bed   room organization consistent   safety round/check completed  Taken 7/5/2022 1200 by Kianna Thompson RN  Safety Promotion/Fall Prevention: patient off unit  Taken 7/5/2022 0958 by Kianna Thompson RN  Safety Promotion/Fall Prevention: patient off unit  Taken 7/5/2022 0900 by Kianna Thompson RN  Safety Promotion/Fall Prevention: patient off unit  Taken 7/5/2022 0726 by Kianna Thompson RN  Safety Promotion/Fall Prevention:   clutter free environment maintained   assistive device/personal items within reach   activity supervised   fall prevention program maintained   nonskid shoes/slippers when out of bed   room organization consistent   safety round/check completed     Problem: Skin Injury Risk Increased  Goal: Skin Health and  Integrity  Outcome: Ongoing, Progressing  Intervention: Optimize Skin Protection  Recent Flowsheet Documentation  Taken 7/5/2022 1545 by Kianna Thompson, RN  Head of Bed (HOB) Positioning: HOB at 20-30 degrees  Taken 7/5/2022 1422 by Kianna Thompson, RN  Head of Bed (HOB) Positioning: HOB at 30-45 degrees  Pressure Reduction Devices: alternating pressure pump (ADD)  Taken 7/5/2022 0726 by Kianna Thompson, RN  Pressure Reduction Techniques:   frequent weight shift encouraged   weight shift assistance provided   heels elevated off bed  Head of Bed (HOB) Positioning: HOB at 30-45 degrees  Pressure Reduction Devices: (accumax requested) --     Problem: Behavior Regulation Impairment (Dementia Signs/Symptoms)  Goal: Improved Behavioral Control (Dementia Signs/Symptoms)  Outcome: Ongoing, Progressing   Goal Outcome Evaluation:  Plan of Care Reviewed With: patient        Progress: no change  Outcome Evaluation: VSS, 2L NC, turn q2h, elevate heels, disoriented x4, dialysis completed today, NPO,  speech to reevaluate tomorrow, MRI ordered, palliative care to see to discuss goals of care.

## 2022-07-05 NOTE — PLAN OF CARE
Goal Outcome Evaluation:  Plan of Care Reviewed With: patient           Outcome Evaluation: VSS. Afib (chronic) on tele. 2L NC. Patient is still confused and tries to get out of bed. Patient is a falls risk and bed alarm is on. Garbled speech and drooling. S/w MD about patient not being able to follow commands and unable to get a swallow study on patient. Still strict NPO. Did not recieve any night time medications due to being NPO. MD aware. MD to talk to family about plan of care today. Will continue to monitor.

## 2022-07-05 NOTE — CASE MANAGEMENT/SOCIAL WORK
Discharge Planning Assessment  [facility]     Patient Name: Shaun Brewster  MRN: [id number]  Today's Date: 7/5/2022    Admit Date: 7/4/2022     Discharge Needs Assessment     Row Name 07/05/22 0817       Living Environment    People in Home alone    Current Living Arrangements home    Primary Care Provided by homecare agency;child(sari)    Provides Primary Care For no one, unable/limited ability to care for self    Family Caregiver if Needed child(sari), adult    Quality of Family Relationships helpful;involved;supportive       Transition Planning    Patient/Family Anticipated Services at Transition home health care;skilled nursing    Transportation Anticipated family or friend will provide       Discharge Needs Assessment    Readmission Within the Last 30 Days no previous admission in last 30 days    Equipment Currently Used at Home rollator;grab bar;commode;shower chair;wheelchair    Concerns to be Addressed discharge planning    Provided Post Acute Provider List? N/A    Provided Post Acute Provider Quality & Resource List? N/A               Discharge Plan     Row Name 07/05/22 0819       Plan    Plan CCP to follow and ssist with appropriate DC plans    Plan Comments S/w pt's son Shaun Duenas at bedside.  Facesheet and pharmacy info confirmed.  Pt lives in a 2 story patio home alone.  He stays in the 1st floor.  He has 24/7 caregivers thru [employer].  Leonardo and pt's dtr Audra visit daily and assist as needed incouding with managing his meds, meals and transport. Home DME includes a rollator, grab bars, raised tooilet seatr, shower chair and w/c. He is current with [facility] - Intake notifed to follow.  He has been to [facility] [place] in JUne of this year. Shaun Duenas states they are not sure of the DC plan at this time.  Palliative care consult is pending.  CCP will followup and assist as needed. .............Mali FU/ DONALDO              Continued Care and Services - Admitted Since 7/4/2022     Home Medical Care      Service Provider Request Status Selected Services Address Phone Fax Patient Preferred     [facility]  Pending - Request Sent N/A [address] [address] [phone] --                 Demographic Summary     Row Name 07/05/22 0816       General Information    Admission Type inpatient    Arrived From home    Referral Source admission list    Reason for Consult discharge planning    Preferred Language English               Functional Status     Row Name 07/05/22 0816       Functional Status    Usual Activity Tolerance fair    Current Activity Tolerance poor       Assessment of Health Literacy    How often do you have someone help you read hospital materials? Always    How often do you have problems learning about your medical condition because of difficulty understanding written information? Always    How often do you have a problem understanding what is told to you about your medical condition? Always    How confident are you filling out medical forms by yourself? Not at all    Health Literacy Low       Functional Status, IADL    Medications completely dependent    Meal Preparation completely dependent    Housekeeping completely dependent    Laundry completely dependent    Shopping completely dependent       Employment/    Employment Status retired                         Mali Connolly RN

## 2022-07-05 NOTE — CONSULTS
DOS: 2022  NAME: Shaun Brewster   : 1930  PCP: Andrew Trujillo MD  CC: Stroke  Referring MD: Hussain Antoine MD, Shaun Posadas MD    Neurological Problem and Interval History:  89 y.o. right-handed white male with a Hx of progressive dementia but otherwise was able to verbally communicate with his son and tell him his name and get along fine with caregivers who are present 24 hours a day helping him with his day-to-day activities.  On  he was trying to get out of bed and he somehow fell and struck his head on the ground.  Ever since then he has been altered and not verbally communicative.  He was brought in yesterday as his condition was worsening.  None of the caregivers or in that room when it happened but they were in the house.  No seizure activity was noted.  When I came to see the patient he was undergoing hemodialysis comfortably.  He looked at me and establish good eye contact and also able to track me with his eyes without problems.  He followed one-step commands well.  He did not verbally communicate although I requested him to say hello.  He did not even tell me his name.  As per the registered nurse, he is not talkative.    He recently had a right hip replacement surgery after he had taken a fall and had a titanium plate placed and also had another surgery sometime back.  He is unstable and is walking and that probably tends him to make him fall.  The CT scan was suggestive of a hygroma which is very prominent in the current CT scan done yesterday..    Past Medical/Surgical Hx:  Past Medical History:   Diagnosis Date   • Anemia    • 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HCC)    • Arthritis    • Atypical chest pain    • Blind right eye    • Chronic kidney disease (CKD), stage V (HCC)    • Difficulty walking    • Dizziness    • Dyslipidemia    • Esophageal reflux    • Esophagitis, reflux    • Femur fracture, right (HCC)    • HL (hearing loss)    • Hypertension    • Macular degeneration    •  Malignant neoplasm of prostate (HCC)     gland   • Memory loss    • Nephrolithiasis    • Nonspecific abnormal finding    • Paroxysmal atrial fibrillation (HCC)    • Postnasal drip    • Premature ventricular contractions    • Renal disease    • Throat pain    • Urge incontinence of urine    • Ventricular tachycardia (HCC)      Past Surgical History:   Procedure Laterality Date   • ARTERIOVENOUS FISTULA Left 2015   • HAND SURGERY Bilateral    • HIP SURGERY     • INGUINAL HERNIA REPAIR     • KNEE SURGERY     • OTHER SURGICAL HISTORY      cardiac cath procedure summary normal, corneal lasik   • REPLACEMENT TOTAL KNEE      left    • SHOULDER SURGERY     • SKIN CANCER EXCISION     • TONSILLECTOMY AND ADENOIDECTOMY         Review of Systems:    Constitutional: Pleasant gentleman currently laying in the bed undergoing hemodialysis.  Cardiovascular: No chest pain or palpitations noted.  Respiratory: Shortness of breath noted.  Gastrointestinal: Nausea and vomiting noted.  Genitourinary: No bladder incontinence noted.  However he has got adult diapers in place.  Musculoskeletal: Does not complain of any pain in the joints but he moves everything well.  Dermatological: No skin breakdown noted.  Neurological: Does not verbally communicate but moves everything well and follows one-step commands.  Psychiatric: Unable to assess at this time.  However he has a history of progressive dementia.  Ophthalmological: No visual changes noted          Medications On Admission  Medications Prior to Admission   Medication Sig Dispense Refill Last Dose   • acetaminophen (TYLENOL) 325 MG tablet Take 2 tablets by mouth Every 4 (Four) Hours As Needed for Mild Pain .   7/3/2022 at Unknown time   • benzonatate (TESSALON) 100 MG capsule Take 1 capsule by mouth 3 (Three) Times a Day As Needed for Cough.   Past Week at Unknown time   • carvedilol (COREG) 6.25 MG tablet Take 1 tablet by mouth 2 (Two) Times a Day With Meals.   7/3/2022 at Unknown time    • donepezil (ARICEPT) 10 MG tablet Take 10 mg by mouth Every Night.   7/3/2022 at Unknown time   • famotidine (PEPCID) 40 MG tablet Take 1 tablet by mouth Daily. 90 tablet 3 7/3/2022 at Unknown time   • finasteride (PROSCAR) 5 MG tablet TAKE 1 TABLET DAILY 90 tablet 3 7/3/2022 at Unknown time   • guaiFENesin (MUCINEX) 600 MG 12 hr tablet Take 1,200 mg by mouth 2 (Two) Times a Day.   7/3/2022 at Unknown time   • guaifenesin (ROBITUSSIN) 100 MG/5ML liquid Take 10 mL by mouth Every Night. (Patient taking differently: Take 200 mg by mouth At Night As Needed.)   Past Week at Unknown time   • lidocaine-prilocaine (EMLA) 2.5-2.5 % cream APPLY SMALL AMOUNT TO ACCESS SITE (AVF) 1 HOUR BEFORE DIALYSIS. COVER WITH OCCLUSIVE DRESSING (SARAN WRAP)   Past Week at Unknown time   • multivitamin (THERAGRAN) tablet tablet Take  by mouth Daily.   7/3/2022 at Unknown time   • polyethylene glycol (MIRALAX) packet Take 17 g by mouth Every 3 (Three) Days.   Past Week at Unknown time   • QUEtiapine (SEROquel) 25 MG tablet TAKE 1 TABLET NIGHTLY 90 tablet 1 7/3/2022 at Unknown time   • RENVELA 800 MG tablet Take 800 mg by mouth 3 (Three) Times a Day. Take 3 tablets x3 daily   7/3/2022 at Unknown time   • rivaroxaban (Xarelto) 15 MG tablet Take 1 tablet by mouth Daily. Take one tablet my mouth on Monday, Wednesday, Friday and Saturday (Patient taking differently: Take 15 mg by mouth Daily. Take one tablet my mouth on Monday, Wednesday, Friday and Saturday) 90 tablet 3 Past Week at Unknown time   • Azelastine-Fluticasone 137-50 MCG/ACT suspension 2 sprays each nostril bid 23 g 5 Unknown at Unknown time   • Cyanocobalamin (Vitamin B-12) 1000 MCG sublingual tablet Place  under the tongue Daily.      • donepezil ODT (ARICEPT ODT) 10 MG disintegrating tablet Place 1 tablet on the tongue Every Night for 90 days. 90 tablet 3        Allergies:  Allergies   Allergen Reactions   • Amiodarone Dizziness   • Nitrofurantoin Unknown - Low Severity        Social Hx:  Social History     Socioeconomic History   • Marital status:    Tobacco Use   • Smoking status: Former Smoker   • Smokeless tobacco: Never Used   • Tobacco comment: denies caffeine use   Vaping Use   • Vaping Use: Never used   Substance and Sexual Activity   • Alcohol use: No   • Drug use: No   • Sexual activity: Defer       Family Hx:  Family History   Problem Relation Age of Onset   • Other Brother         acute bacterial endocarditis       Review of Imaging (Interpretation of images not reports): Reviewed the CT of the head performed on July 4, 2022 as follows:    FINDINGS: There is increased CSF density space surrounding both cerebral  hemispheres greatest adjacent to the frontal and parietal lobes but also  extending adjacent to the temporal lobes. These are consistent with  chronic bilateral subdural hygromas. When compared to the head CT 2 days  ago, there have developed increased curvilinear areas of increased  density within the subdural collections. This is greatest adjacent to  the posterolateral superior right frontal lobe and right parietal lobe.  This may be associated with increased vascularity or small areas of  minimal hemorrhage into subdural hygromas and short internal follow-up  evaluation is recommended. There is also subtle increased density within  the component of subdural hygroma in the left temporal region as well as  anteromedial to the left frontal lobe.     There is no evidence for intraventricular hemorrhage. There are no  abnormal areas of increased attenuation intraaxially to suggest  intraparenchymal hemorrhage. There is a small focus of cortical  parenchymal dystrophic calcification within the posteromedial-superior  right frontal lobe and this is without change.     Intracranial atherosclerotic calcifications are present involving the  cavernous and supracavernous segments of both internal carotid arteries  and the intracranial vertebral arteries.     There  is moderate left maxillary mucosal thickening similar to the  previous head CT. Bone windows demonstrate no calvarial fracture.     IMPRESSION:  Bilateral cerebral convexity subdural hygromas are similar  in size to the head CT 2 days ago and previous head CT 05/22/2022.  Hygromas now contain small curvilinear areas of increased density that  could in part represent vascularity though appear new since the previous  exam and are suspicious for small areas of hemorrhage into subdural  hygromas such as best demonstrated posterolateral to the superior right  frontal lobe and in the left temporal region. Short interval follow-up  head CT is recommended.    Additional Tests Performed: Reviewed the films from July 2, 2022 that shows the following:    Head CT:     No acute intracranial hemorrhage, midline shift or mass effect. No acute  territorial infarct is identified. A chronic parenchymal calcification  is seen at the posterior medial aspect the right frontal lobe. Relative  prominence of CSF space around the cerebrum could reflect presence of  subdural hygroma; this appearance is chronic.     Mild periventricular hypodensities suggest chronic small vessel ischemic  change in a patient this age.     Arterial calcifications are seen at the base of the brain.     Ventricles, cisterns, cerebral sulci are unremarkable for patient age.           Facial CT:        A left inferior orbital wall blowout fracture is noted with small  herniation of orbital fat, but no muscular entrapment demonstrated. No  retrobulbar hematoma. No other facial fractures are identified.    Cervical spine CT:     Mild anterolisthesis of C2 on C3, C3 on C4.     No acute fracture is identified. Assessment at the mid cervical levels  is limited by motion artifact.     Facet and uncovertebral joint hypertrophy contributes to neuroforaminal  narrowing, more prominent on the right at C3/4, left at C3/4, C4/5. Disc  osteophyte complex appears to result in  "mild central stenosis at C2/3,  C3/4, C4/5, C5/6, C6/7.     Mild right, minimal left pleural effusions.     Right thyroid nodularity is apparent, but suboptimally evaluated with  this technique, thyroid ultrasound correlation can be obtained as  indicated.           IMPRESSION:  1. Left inferior orbital wall blowout fracture. No muscular entrapment.  No other facial fractures identified. Fluid level in the left maxillary  sinus.  2. No acute intracranial hemorrhage or hydrocephalus; chronic changes of  the brain. No acute cervical fracture identified (limited by motion  artifact); degenerative changes in cervical spine. If there is further  clinical concern, MRI could be considered for further evaluation.  3. Incidental findings, as noted above.    Laboratory Results:   Lab Results   Component Value Date    GLUCOSE 112 (H) 07/05/2022    CALCIUM 9.7 (H) 07/05/2022     07/05/2022    K 5.6 (H) 07/05/2022    CO2 24.0 07/05/2022    CL 99 07/05/2022    BUN 54 (H) 07/05/2022    CREATININE 7.28 (H) 07/05/2022    EGFRIFAFRI  12/23/2018      Comment:      <15 Indicative of kidney failure.    EGFRIFNONA 10 (L) 12/23/2018    BCR 7.4 07/05/2022    ANIONGAP 18.0 (H) 07/05/2022     Lab Results   Component Value Date    WBC 10.00 07/05/2022    HGB 9.1 (L) 07/05/2022    HCT 27.6 (L) 07/05/2022    MCV 95.8 07/05/2022     07/05/2022       Lab Results   Component Value Date    INR 1.39 (H) 07/04/2022    INR 1.48 (H) 04/21/2022    INR 5.11 (C) 06/15/2020    PROTIME 16.8 (H) 07/04/2022    PROTIME 17.9 (H) 04/21/2022    PROTIME 47.1 (C) 06/15/2020         Physical Examination:  /43 (BP Location: Right arm, Patient Position: Lying)   Pulse 88   Temp 98.2 °F (36.8 °C) (Temporal)   Resp 24   Ht 188 cm (74\")   Wt 76.2 kg (168 lb)   SpO2 96%   BMI 21.57 kg/m²   General Appearance:   Well developed, well nourished, well groomed, alert, and cooperative.  HEENT: Normocephalic.    Neck and Spine: Normal range of motion.  " Normal alignment. No mass or tenderness. No bruits.  Cardiac: Regular rate and rhythm. No murmurs.  Peripheral Vasculature: Radial and pedal pulses are equal and symmetric. No signs of distal embolization.  Extremities:    No edema or deformities. Normal joint ROM. JORGE hoses and SCD's in place  Skin:    No rashes or birth marks.    Neurological examination:  Higher Integrative  Function: Lenses eyes and establishes good eye contact and tracks me with his eyes but does not verbalize and make some guttural noises.  He seems to follow one-step commands without any problems.  CN II: Pupils are equal, round, and reactive to light. Normal visual acuity and visual fields.    CN III IV VI: Extraocular movements are full without nystagmus.   CN V: Normal facial sensation and strength of muscles of mastication.  CN VII: Facial movements are symmetric. No weakness.  CN VIII:   Auditory acuity is normal.  CN IX & X:   Symmetric palatal movement.  CN XI: Sternocleidomastoid and trapezius are normal.  No weakness.  CN XII:   The tongue is midline.  No atrophy or fasciculations.  Motor: Normal muscle strength, bulk and tone in upper and lower extremities.  No fasciculations, rigidity, spasticity, or abnormal movements.  Reflexes: 2+ in the upper and lower extremities. Plantar responses are flexor.  Sensation: Withdraws to painful stimuli.  Station and Gait: Unable to test gait or Romberg at this point..    Coordination: He is unable to comprehend how to perform finger-to-nose coordination or heel-to-shin..      Diagnoses / Discussion:  89 y.o. who presents with Sx of complete leukopenia as well as confusion following the trauma.  It seems that the patient has hygromas.    Plan:  Requested an MRI of the brain without contrast with trauma protocol.    We will reassess his condition after the MRI has been completed and if there is further shift or blood products we can request neurosurgical opinion at that time.    I have discussed  the above with the patient and his son Mr. Shaun Brewster on his home phone number at [phone] and he will be followed up by our inpatient neurology team..  Time spent with patient: 60min    Coding    Dictated using Dragon dictation.

## 2022-07-05 NOTE — CONSULTS
[facility]   Consult Note    Patient Name: Shaun Brewster  : 1930  MRN: [id number]  Primary Care Physician:  Andrew Trujillo MD  Referring Physician: Hussain Antoine MD  Date of admission: 2022    Neurosurgery (on-call MD unless specified)  Consult performed by: Munira Guo APRN  Consult ordered by: Hussain Antoine MD  Reason for consult: bilateral subdural hygromas        Subjective   Subjective     Reason for Consult/ Chief Complaint: subdural hygromas    History of Present Illness  Shaun Brewster is a 89 y.o. male with a history of multiple medical problems including chronic kidney disease treated with dialysis, hypertension, atrial fibrillation (on Xarelto) dementia.  He is blind in the right eye and also suffers from macular degeneration.  There is a history of prior prostate cancer he resides in a nursing home.  He experienced an unwitnessed fall and subsequently became more confused.  He was therefore brought to [facility] emergency department for evaluation.  Work-up revealed bilateral cerebral convexity right frontal and left temporal subdural hygromas. These are unchanged when compared to study performed 2 days ago and also unchanged on a previous head CT May 22, 2022.  Neurosurgery has been asked to evaluate the patient and make recommendations.    Review of Systems   Unable to perform ROS: Dementia        Personal History     Past Medical History:   Diagnosis Date   • Anemia    • 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HCC)    • Arthritis    • Atypical chest pain    • Blind right eye    • Chronic kidney disease (CKD), stage V (HCC)    • Difficulty walking    • Dizziness    • Dyslipidemia    • Esophageal reflux    • Esophagitis, reflux    • Femur fracture, right (HCC)    • HL (hearing loss)    • Hypertension    • Macular degeneration    • Malignant neoplasm of prostate (HCC)     gland   • Memory loss    • Nephrolithiasis    • Nonspecific abnormal finding    • Paroxysmal atrial fibrillation (HCC)     • Postnasal drip    • Premature ventricular contractions    • Renal disease    • Throat pain    • Urge incontinence of urine    • Ventricular tachycardia (HCC)        Past Surgical History:   Procedure Laterality Date   • ARTERIOVENOUS FISTULA Left 2015   • HAND SURGERY Bilateral    • HIP SURGERY     • INGUINAL HERNIA REPAIR     • KNEE SURGERY     • OTHER SURGICAL HISTORY      cardiac cath procedure summary normal, corneal lasik   • REPLACEMENT TOTAL KNEE      left    • SHOULDER SURGERY     • SKIN CANCER EXCISION     • TONSILLECTOMY AND ADENOIDECTOMY         Family History: family history includes Other in his brother. Otherwise pertinent FHx was reviewed and not pertinent to current issue.    Social History:  reports that he has quit smoking. He has never used smokeless tobacco. He reports that he does not drink alcohol and does not use drugs.    Home Medications:   Azelastine-Fluticasone, QUEtiapine, Vitamin B-12, acetaminophen, benzonatate, carvedilol, donepezil, donepezil ODT, famotidine, finasteride, guaiFENesin, guaifenesin, lidocaine-prilocaine, multivitamin, polyethylene glycol, rivaroxaban, and sevelamer    Allergies:  Allergies   Allergen Reactions   • Amiodarone Dizziness   • Nitrofurantoin Unknown - Low Severity       Objective    Objective     Vitals:  Temp:  [97.4 °F (36.3 °C)-98.3 °F (36.8 °C)] 98.2 °F (36.8 °C)  Heart Rate:  [] 88  Resp:  [18-24] 24  BP: (106-134)/(43-90) 134/43  Flow (L/min):  [2-3] 3    Physical Exam  Vitals reviewed.   Constitutional:       General: He is not in acute distress.     Appearance: He is well-developed. He is not ill-appearing, toxic-appearing or diaphoretic.      Comments: This is a 89-year-old gentleman who is currently in the dialysis unit.  He is drowsy but awakens to loud verbal stimuli.  He makes eye contact but is nonverbal.   HENT:      Head: Normocephalic and atraumatic.      Right Ear: External ear normal.      Left Ear: External ear normal.       Nose: Nose normal.      Mouth/Throat:      Mouth: Mucous membranes are dry.      Pharynx: Oropharynx is clear.   Eyes:      General:         Right eye: No discharge.         Left eye: No discharge.      Conjunctiva/sclera: Conjunctivae normal.   Neck:      Trachea: No tracheal deviation.   Cardiovascular:      Rate and Rhythm: Normal rate.   Pulmonary:      Effort: Pulmonary effort is normal. No respiratory distress.   Abdominal:      General: There is no distension.      Palpations: Abdomen is soft.      Tenderness: There is no abdominal tenderness.   Musculoskeletal:         General: No swelling, tenderness or deformity.      Cervical back: Normal range of motion and neck supple. No tenderness.   Skin:     General: Skin is warm and dry.      Findings: No erythema or rash.   Neurological:      Mental Status: He is alert.      GCS: GCS eye subscore is 4. GCS verbal subscore is 5. GCS motor subscore is 6.      Motor: No abnormal muscle tone.      Deep Tendon Reflexes: Reflexes are normal and symmetric. Reflexes normal.      Comments: Awakened to verbal stimuli. Non-verbal. Makes eye contact to right and left side of bed. Followed commands such as squeezing hands bilaterally to command. Unable to fully assess motor exam due to no verbal output. DTR's normal. Negative Viera's; negative clonus.    Psychiatric:         Behavior: Behavior is cooperative.         Result Review    Result Review:  I have personally reviewed the results from the time of this admission to 7/5/2022 13:47 EDT and agree with these findings:  [x]  Laboratory list / accordion  [x]  Microbiology  [x]  Radiology  []  EKG/Telemetry   []  Cardiology/Vascular   []  Pathology  []  Old records  []  Other:  Most notable findings include:     CT HEAD WITHOUT CONTRAST 7/4/2022    Bilateral cerebral convexity subdural hygromas appear similar when compared to the previous study 2 days ago, 05/22/2022.  Bilateral cerebral convexity subdural hygromas show a  small, thin newer acute component into the subdural hygromas. No evidence of mass-effect.      Assessment & Plan   Assessment / Plan     Brief Patient Summary:  Shaun Brewster is a 89 y.o. male who has a history of multiple medical problems including chronic kidney failure currently on dialysis.  He had a fall recently at the nursing facility. He had subsequent changes in baseline functioning. He presented to [facility] where CT imaging revealed small acute SDH on bilateral hygromas.     Active Hospital Problems:  Active Hospital Problems    Diagnosis    • **Intracranial bleed (HCC)    • Anemia of chronic disease    • Closed blow-out fracture of left orbital floor ([facility])    • History of hip fracture, May 2022    • Recurrent falls    • Macular degeneration    • Malignant neoplasm of prostate ([facility])      gland     • Altered mental status    • Hemodialysis patient ([facility])    • Chronic systolic CHF (congestive heart failure) ([facility])    • DNR (do not resuscitate)    • Late onset Alzheimer's disease without behavioral disturbance ([facility])    • ESRD (end stage renal disease) ([facility])    • Hypertension    • Chronic atrial fibrillation ([facility])      Plan:     Repeat head CT showed no significant change in hygromas  Neurology has also seen and has ordered MRI brain. Likely nothing to add from Neurosurgical standpoint but will be available if needed.     Munira Guo, APRN

## 2022-07-06 NOTE — THERAPY EVALUATION
Patient Name: Shaun Brewster  : 1930    MRN: [id number]                              Today's Date: 2022       Admit Date: 2022    Visit Dx:     ICD-10-CM ICD-9-CM   1. Altered mental status, unspecified altered mental status type  R41.82 780.97   2. Bilateral subdural hematomas ([facility])  S06.5X9A 432.1   3. ESRD (end stage renal disease) on dialysis ([facility])  N18.6 585.6    Z99.2 V45.11     Patient Active Problem List   Diagnosis   • Ventricular tachycardia ([facility])   • Chronic atrial fibrillation ([facility])   • Hypertension   • ESRD (end stage renal disease) ([facility])   • Closed fracture of multiple ribs of right side   • Pleural effusion on right   • Kidney cysts   • Liver cyst   • Acute biliary pancreatitis without infection or necrosis   • Late onset Alzheimer's disease without behavioral disturbance ([facility])   • At risk for falls   • Pneumonia of both lungs due to infectious organism   • Acute respiratory failure with hypoxia ([facility])   • Rhinovirus infection   • DNR (do not resuscitate)   • Chronic systolic CHF (congestive heart failure) ([facility])   • NSTEMI (non-ST elevated myocardial infarction) ([facility])   • Altered mental status   • Intracranial bleed ([facility])   • Hemodialysis patient ([facility])   • Anemia of chronic disease   • Closed blow-out fracture of left orbital floor ([facility])   • History of hip fracture, May 2022   • Recurrent falls   • Macular degeneration   • Malignant neoplasm of prostate (HCC)     Past Medical History:   Diagnosis Date   • Anemia    • 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[facility])    • Arthritis    • Atypical chest pain    • Blind right eye    • Chronic kidney disease (CKD), stage V ([facility])    • Difficulty walking    • Dizziness    • Dyslipidemia    • Esophageal reflux    • Esophagitis, reflux    • Femur fracture, right (HCC)    • HL (hearing loss)    • Hypertension    • Macular degeneration    • Malignant neoplasm of prostate (HCC)     gland   • Memory loss    • Nephrolithiasis    • Nonspecific abnormal finding    •  Paroxysmal atrial fibrillation (HCC)    • Postnasal drip    • Premature ventricular contractions    • Renal disease    • Throat pain    • Urge incontinence of urine    • Ventricular tachycardia (HCC)      Past Surgical History:   Procedure Laterality Date   • ARTERIOVENOUS FISTULA Left 2015   • HAND SURGERY Bilateral    • HIP SURGERY     • INGUINAL HERNIA REPAIR     • KNEE SURGERY     • OTHER SURGICAL HISTORY      cardiac cath procedure summary normal, corneal lasik   • REPLACEMENT TOTAL KNEE      left    • SHOULDER SURGERY     • SKIN CANCER EXCISION     • TONSILLECTOMY AND ADENOIDECTOMY        General Information     Row Name 07/06/22 1047          Physical Therapy Time and Intention    Document Type evaluation  Pt. admitted with left sided weakness, slurred speech, Acute SDH.  h/o Dementia  -MS     Mode of Treatment physical therapy;individual therapy  -MS     Row Name 07/06/22 1047          General Information    Patient Profile Reviewed yes  -MS     Prior Level of Function --  Per family, pt. using a Rwx for ambulation just prior to admission  -MS     Existing Precautions/Restrictions fall   [place] vest restraints;  Exit alarm  -MS     Barriers to Rehab cognitive status  -MS     Row Name 07/06/22 1047          Cognition    Orientation Status (Cognition) unable/difficult to assess  Pt. mostly non-verbal throughout P.T. session. Occ. Slurred speech.  -MS     Row Name 07/06/22 1047          Safety Issues, Functional Mobility    Comment, Safety Issues/Impairments (Mobility) Gait belt used for safety.  -MS           User Key  (r) = Recorded By, (t) = Taken By, (c) = Cosigned By    Initials Name Provider Type    MS Jorge Hilton, PT Physical Therapist               Mobility     Row Name 07/06/22 1045          Bed Mobility    Bed Mobility supine-sit;sit-supine  -MS     Supine-Sit [place] (Bed Mobility) minimum assist (75% patient effort);2 person assist  -MS     Sit-Supine [place] (Bed Mobility) minimum  assist (75% patient effort);2 person assist  -MS     Assistive Device (Bed Mobility) draw sheet  -MS     Row Name 07/06/22 1048          Sit-Stand Transfer    Sit-Stand [place] (Transfers) minimum assist (75% patient effort);2 person assist  -MS     Assistive Device (Sit-Stand Transfers) --  HHA x 2  -MS     Row Name 07/06/22 1048          Gait/Stairs (Locomotion)    [place] Level (Gait) minimum assist (75% patient effort);2 person assist  -MS     Assistive Device (Gait) --  HHA x 2  -MS     Distance in Feet (Gait) 20 feet  -MS     Deviations/Abnormal Patterns (Gait) keagan decreased;stride length decreased  -MS     Bilateral Gait Deviations forward flexed posture  -MS     Comment, (Gait/Stairs) Verbal/tactile cues given for posture correction  -MS           User Key  (r) = Recorded By, (t) = Taken By, (c) = Cosigned By    Initials Name Provider Type    Jorge Raygoza SLADE, PT Physical Therapist               Obj/Interventions     Row Name 07/06/22 1049          Range of Motion Comprehensive    Comment, General Range of Motion BUE/LE (WFL's)  -MS     Row Name 07/06/22 1049          Strength Comprehensive (MMT)    Comment, General Manual Muscle Testing (MMT) Assessment BUE/LE (3/5)  -MS           User Key  (r) = Recorded By, (t) = Taken By, (c) = Cosigned By    Initials Name Provider Type    Jorge Raygoza SLADE, PT Physical Therapist               Goals/Plan     Row Name 07/06/22 1050          Bed Mobility Goal 1 (PT)    Activity/Assistive Device (Bed Mobility Goal 1, PT) bed mobility activities, all  -MS     [place] Level/Cues Needed (Bed Mobility Goal 1, PT) contact guard required  -MS     Time Frame (Bed Mobility Goal 1, PT) long term goal (LTG);1 week  -MS     Row Name 07/06/22 1050          Transfer Goal 1 (PT)    Activity/Assistive Device (Transfer Goal 1, PT) transfers, all;walker, rolling  -MS     [place] Level/Cues Needed (Transfer Goal 1, PT) contact guard required  -MS     Time Frame  (Transfer Goal 1, PT) long term goal (LTG);1 week  -MS     Row Name 07/06/22 1050          Gait Training Goal 1 (PT)    Activity/Assistive Device (Gait Training Goal 1, PT) gait (walking locomotion);walker, rolling  -MS     [place] Level (Gait Training Goal 1, PT) contact guard required  -MS     Distance (Gait Training Goal 1, PT) 100 feet  -MS     Time Frame (Gait Training Goal 1, PT) long term goal (LTG);1 week  -MS     Row Name 07/06/22 1050          Therapy Assessment/Plan (PT)    Planned Therapy Interventions (PT) balance training;bed mobility training;gait training;home exercise program;patient/family education;postural re-education;transfer training;strengthening  -MS           User Key  (r) = Recorded By, (t) = Taken By, (c) = Cosigned By    Initials Name Provider Type    Jorge Raygoza, PT Physical Therapist               Clinical Impression     Row Name 07/06/22 1049          Pain    Pretreatment Pain Rating 0/10 - no pain  -MS     Posttreatment Pain Rating 0/10 - no pain  -MS     Row Name 07/06/22 1049          Plan of Care Review    Plan of Care Reviewed With patient;family  -MS     Row Name 07/06/22 1049          Therapy Assessment/Plan (PT)    Rehab Potential (PT) good, to achieve stated therapy goals  -MS     Criteria for Skilled Interventions Met (PT) skilled treatment is necessary  -MS     Therapy Frequency (PT) 6 times/wk  -MS     Row Name 07/06/22 1049          Positioning and Restraints    Pre-Treatment Position in bed  -MS     Post Treatment Position bed  -MS     In Bed notified nsg;supine;call light within reach;encouraged to call for assist;exit alarm on;with family/caregiver  All lines intact.  -MS     Restraints reapplied:;vest  [place] Vest  -MS           User Key  (r) = Recorded By, (t) = Taken By, (c) = Cosigned By    Initials Name Provider Type    Jorge Raygoza, PT Physical Therapist               Outcome Measures     Row Name 07/06/22 1050          How much help from  another person do you currently need...    Turning from your back to your side while in flat bed without using bedrails? 2  -MS     Moving from lying on back to sitting on the side of a flat bed without bedrails? 2  -MS     Moving to and from a bed to a chair (including a wheelchair)? 2  -MS     Standing up from a chair using your arms (e.g., wheelchair, bedside chair)? 2  -MS     Climbing 3-5 steps with a railing? 2  -MS     To walk in hospital room? 2  -MS     AM-PAC 6 Clicks Score (PT) 12  -MS     Highest level of mobility 4 --> Transferred to chair/commode  -MS     Row Name 07/06/22 1050          Functional Assessment    Outcome Measure Options AM-PAC 6 Clicks Basic Mobility (PT)  -MS           User Key  (r) = Recorded By, (t) = Taken By, (c) = Cosigned By    Initials Name Provider Type    Jorge Raygoza PT Physical Therapist                             Physical Therapy Education                 Title: PT OT SLP Therapies (In Progress)     Topic: Physical Therapy (In Progress)     Point: Mobility training (In Progress)     Learning Progress Summary           Patient Acceptance, E,D, NR by MS at 7/6/2022 1051                   Point: Home exercise program (In Progress)     Learning Progress Summary           Patient Acceptance, E,D, NR by MS at 7/6/2022 1051                   Point: Body mechanics (In Progress)     Learning Progress Summary           Patient Acceptance, E,D, NR by MS at 7/6/2022 1051                   Point: Precautions (In Progress)     Learning Progress Summary           Patient Acceptance, E,D, NR by MS at 7/6/2022 1051                               User Key     Initials Effective Dates Name Provider Type Discipline    MS 06/16/21 -  Jorge Hilton PT Physical Therapist PT              PT Recommendation and Plan  Planned Therapy Interventions (PT): balance training, bed mobility training, gait training, home exercise program, patient/family education, postural re-education, transfer  training, strengthening  Plan of Care Reviewed With: patient  Outcome Evaluation: Pt. is a 89 year old Male admitted to the hospital with an Acute SDH and recent h/o fall.  Pt. also with h/o dementia.  Per family, pt. was able to ambulate using a Rwx just prior to admission.  Pt. currently presents with decreased strength, decreased balance, and decreased tolerance to functional activity.  This AM, pt. able to ambulate 20 feet, Min. assist x 2, with HHA x 2.  Pt. requires Min. assist x 2 for bed mobility and Min. assist x 2 for sit <-> stand transfers.  Pt. will benefit from skilled inpt. P.T. to address his functional deficits and to assist pt. in regaining his maximum level of independence with functional mobility.     Time Calculation:    PT Charges     Row Name 07/06/22 1053             Time Calculation    Start Time 0855  -MS      Stop Time 0910  -MS      Time Calculation (min) 15 min  -MS      PT Received On 07/06/22  -MS      PT - Next Appointment 07/07/22  -MS      PT Goal Re-Cert Due Date 07/13/22  -MS              Time Calculation- PT    Total Timed Code Minutes- PT 14 minute(s)  -MS            User Key  (r) = Recorded By, (t) = Taken By, (c) = Cosigned By    Initials Name Provider Type    Jorge Raygoza, PT Physical Therapist              Therapy Charges for Today     Code Description Service Date Service Provider Modifiers Qty    22816346688  PT EVAL MOD COMPLEXITY 2 7/6/2022 Jorge Hilton, PT GP 1    23771534688 HC PT THERAPEUTIC ACT EA 15 MIN 7/6/2022 Jorge Hilton, PT GP 1    89903716934 HC PT THER SUPP EA 15 MIN 7/6/2022 Jorge Hilton, PT GP 1          PT G-Codes  Outcome Measure Options: AM-PAC 6 Clicks Basic Mobility (PT)  AM-PAC 6 Clicks Score (PT): 12    Jorge Hilton PT  7/6/2022

## 2022-07-06 NOTE — CONSULTS
"Purpose of the visit was to evaluate for: goals of care/advanced care planning, support for patient/family, hospice referral/discussion and comfort care. Spoke with MD, RN and CCP as well as family and discussed palliative care, goals of care, care options, resuscitation status and [facility].      Assessment:  Patient is palliative care appropriate for inpatient care given (list diagnosis/symptoms):  History of ESRD on HD, dementia, CHF, and atrial fibrillation. Patient admitted post fall and altered mental status. Upon assessment patient is requiring vest restraint for safety, agitated and restless. PPS 30%      Recommendations/Plan: Transfer to palliative care unit with goal of comfort measures only. Discontinue dialysis, lab work, and any further aggressive intervention. Hospice consult, palliative care team will continue to follow for further discussion and support.     Other Comments: Spoke with patient's daughter and son at bedside. All voiced understanding of patient condition, decline, and prognosis. Both agreed goal at this time would be comfort and quality, stating \"he would never want to live this way\". Discussed palliative care unit, symptom management, and hospice. Family aware of palliative care unit, as their mother  there years ago. Both agree to transfer to unit with comfort focus.   "

## 2022-07-06 NOTE — THERAPY EVALUATION
Patient Name: Shaun Brewster  : 1930    MRN: [id number]                              Today's Date: 2022       Admit Date: 2022    Visit Dx:     ICD-10-CM ICD-9-CM   1. Altered mental status, unspecified altered mental status type  R41.82 780.97   2. Bilateral subdural hematomas (Formerly [facility])  S06.5X9A 432.1   3. ESRD (end stage renal disease) on dialysis (Formerly [facility])  N18.6 585.6    Z99.2 V45.11     Patient Active Problem List   Diagnosis   • Ventricular tachycardia (Formerly [facility])   • Chronic atrial fibrillation (Formerly [facility])   • Hypertension   • ESRD (end stage renal disease) (Formerly [facility])   • Closed fracture of multiple ribs of right side   • Pleural effusion on right   • Kidney cysts   • Liver cyst   • Acute biliary pancreatitis without infection or necrosis   • Late onset Alzheimer's disease without behavioral disturbance ([facility]ly [facility])   • At risk for falls   • Pneumonia of both lungs due to infectious organism   • Acute respiratory failure with hypoxia ([facility]ly [facility])   • Rhinovirus infection   • DNR (do not resuscitate)   • Chronic systolic CHF (congestive heart failure) (Formerly [facility])   • NSTEMI (non-ST elevated myocardial infarction) (Formerly [facility])   • Altered mental status   • Intracranial bleed (Formerly [facility])   • Hemodialysis patient (Formerly [facility])   • Anemia of chronic disease   • Closed blow-out fracture of left orbital floor (Formerly [facility])   • History of hip fracture, May 2022   • Recurrent falls   • Macular degeneration   • Malignant neoplasm of prostate (HCC)     Past Medical History:   Diagnosis Date   • Anemia    • 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[facility])    • Arthritis    • Atypical chest pain    • Blind right eye    • Chronic kidney disease (CKD), stage V ([facility]ly [facility])    • Difficulty walking    • Dizziness    • Dyslipidemia    • Esophageal reflux    • Esophagitis, reflux    • Femur fracture, right (HCC)    • HL (hearing loss)    • Hypertension    • Macular degeneration    • Malignant neoplasm of prostate (HCC)     gland   • Memory loss    • Nephrolithiasis    • Nonspecific abnormal finding    •  Paroxysmal atrial fibrillation (HCC)    • Postnasal drip    • Premature ventricular contractions    • Renal disease    • Throat pain    • Urge incontinence of urine    • Ventricular tachycardia (HCC)      Past Surgical History:   Procedure Laterality Date   • ARTERIOVENOUS FISTULA Left 2015   • HAND SURGERY Bilateral    • HIP SURGERY     • INGUINAL HERNIA REPAIR     • KNEE SURGERY     • OTHER SURGICAL HISTORY      cardiac cath procedure summary normal, corneal lasik   • REPLACEMENT TOTAL KNEE      left    • SHOULDER SURGERY     • SKIN CANCER EXCISION     • TONSILLECTOMY AND ADENOIDECTOMY        General Information     [facility] Name 07/06/22 1235          OT Time and Intention    Document Type evaluation  -     Mode of Treatment individual therapy;occupational therapy  -     Row Name 07/06/22 1235          General Information    Patient Profile Reviewed yes  -     Prior Level of Function independent:;transfer;gait;all household mobility;bed mobility;ADL's  prior to fall on Saturday and used a walker  -     Existing Precautions/Restrictions fall;other (see comments)  posey vest restraint. confusion. dementia.  -     Barriers to Rehab cognitive status  -[facility] Name 07/06/22 1235          Living Environment    People in Home other (see comments)  has caregivers 24/7  -     Row Name 07/06/22 1235          Cognition    Orientation Status (Cognition) oriented to;person  -     Row Name 07/06/22 1235          Safety Issues, Functional Mobility    Safety Issues Affecting Function (Mobility) awareness of need for assistance;ability to follow commands;insight into deficits/self-awareness;safety precaution awareness  -     Impairments Affecting Function (Mobility) balance;cognition;strength;endurance/activity tolerance  -     Cognitive Impairments, Mobility Safety/Performance attention;awareness, need for assistance;insight into deficits/self-awareness;safety precaution awareness  -           User Key  (r) =  Recorded By, (t) = Taken By, (c) = Cosigned By    Initials Name Provider Type    Anel Jackson OTR Occupational Therapist                 Mobility/ADL's     Row Name 07/06/22 1236          Bed Mobility    Comment, (Bed Mobility) pt declined to get to EOB. up earlier w PT  -[facility] Name 07/06/22 1236          Activities of Daily Living    BADL Assessment/Intervention grooming  -KP     Row Name 07/06/22 1236          Grooming Assessment/Training    Comment, (Grooming) pt declined to complete any ADls.  -           User Key  (r) = Recorded By, (t) = Taken By, (c) = Cosigned By    Initials Name Provider Type    Anel Jackson OTR Occupational Therapist               Obj/Interventions     [facility] Name 07/06/22 1236          Sensory Assessment (Somatosensory)    Sensory Assessment (Somatosensory) unable/difficult to assess  -KP     Row Name 07/06/22 1236          Vision Assessment/Intervention    Visual Impairment/Limitations unable/difficult to assess  -KP     Row Name 07/06/22 1236          Range of Motion Comprehensive    Comment, General Range of Motion UE appear to be WFL but pt having difficulty w directions  -KP     Row Name 07/06/22 1236          Strength Comprehensive (MMT)    Comment, General Manual Muscle Testing (MMT) Assessment w A pt moves UEs. max VCs to complete.  -[facility] Name 07/06/22 1236          Motor Skills    Motor Skills coordination;functional endurance  -KP     Functional Endurance fair -  -KP     Row Name 07/06/22 1236          Balance    Comment, Balance NT pt declined to get to EOB  -           User Key  (r) = Recorded By, (t) = Taken By, (c) = Cosigned By    Initials Name Provider Type    Anel Jackson OTR Occupational Therapist               Goals/Plan     [facility] Name 07/06/22 1240          Transfer Goal 1 (OT)    Activity/Assistive Device (Transfer Goal 1, OT) sit-to-stand/stand-to-sit;bed-to-chair/chair-to-bed;toilet  goal based on PT eval where pt  was min x2  -KP     [place] Level/Cues Needed (Transfer Goal 1, OT) contact guard required  -KP     Time Frame (Transfer Goal 1, OT) short term goal (STG);2 weeks  -KP     Progress/Outcome (Transfer Goal 1, OT) goal ongoing  -     Row Name 07/06/22 1240          Strength Goal 1 (OT)    Strength Goal 1 (OT) incr UE to 4-/5  -KP     Time Frame (Strength Goal 1, OT) short term goal (STG);2 weeks  -KP     Progress/Outcome (Strength Goal 1, OT) goal ongoing  -     Row Name 07/06/22 1240          Problem Specific Goal 1 (OT)    Problem Specific Goal 1 (OT) pt to follow 1 step direction 70% of the time to incr independence w ADLs.  -KP     Time Frame (Problem Specific Goal 1, OT) short term goal (STG);2 weeks  -KP     Progress/Outcome (Problem Specific Goal 1, OT) goal ongoing  -     Row Name 07/06/22 1240          Therapy Assessment/Plan (OT)    Planned Therapy Interventions (OT) activity tolerance training;functional balance retraining;occupation/activity based interventions;BADL retraining;patient/caregiver education/training;neuromuscular control/coordination retraining;transfer/mobility retraining;strengthening exercise;ROM/therapeutic exercise  -           User Key  (r) = Recorded By, (t) = Taken By, (c) = Cosigned By    Initials Name Provider Type    Anel Jackson, OTR Occupational Therapist               Clinical Impression     Row Name 07/06/22 1237          Pain Assessment    Pretreatment Pain Rating 0/10 - no pain  -KP     Posttreatment Pain Rating 0/10 - no pain  -     Row Name 07/06/22 1237          Plan of Care Review    Plan of Care Reviewed With patient  -     Progress no change  -KP     Outcome Evaluation pt evaluated for OT. pt admitted s/p fall last Saturday and dx w SDH. pt family member present and states pt has caregivers in the home but he attempted to get up on his own out of the bed and fell. Pt was mostly independent PTA but had some help w ADLs when needed. pt this  date was having difficulty w directions and was up earlier w PT. pt required multiple cues to move UEs. pt has decr strength and endurance. pt also declines to do any ADLs this am. pt cont to benefit from OT to incr ADl, strength, balance, and tsf. rec SNU at [place].  -     Row Name 07/06/22 1237          Therapy Assessment/Plan (OT)    Rehab Potential (OT) good, to achieve stated therapy goals  -     Criteria for Skilled Therapeutic Interventions Met (OT) yes;meets criteria;skilled treatment is necessary  -     Therapy Frequency (OT) 5 times/wk  -     Row Name 07/06/22 1237          Therapy Plan Review/Discharge Plan (OT)    Anticipated Discharge Disposition (OT) skilled nursing facility  -     Row Name 07/06/22 1237          Positioning and Restraints    Pre-Treatment Position in bed  -     Post Treatment Position bed  -KP     In Bed supine;call light within reach;encouraged to call for assist;exit alarm on;with family/caregiver  -           User Key  (r) = Recorded By, (t) = Taken By, (c) = Cosigned By    Initials Name Provider Type    Anel Jackson, OTR Occupational Therapist               Outcome Measures     Row Name 07/06/22 1241          How much help from another is currently needed...    Putting on and taking off regular lower body clothing? 1  -KP     Bathing (including washing, rinsing, and drying) 1  -KP     Toileting (which includes using toilet bed pan or urinal) 1  -KP     Putting on and taking off regular upper body clothing 2  -KP     Taking care of personal grooming (such as brushing teeth) 2  -KP     Eating meals 2  -KP     AM-PAC 6 Clicks Score (OT) 9  -     Row Name 07/06/22 1050          How much help from another person do you currently need...    Turning from your back to your side while in flat bed without using bedrails? 2  -MS     Moving from lying on back to sitting on the side of a flat bed without bedrails? 2  -MS     Moving to and from a bed to a chair  (including a wheelchair)? 2  -MS     Standing up from a chair using your arms (e.g., wheelchair, bedside chair)? 2  -MS     Climbing 3-5 steps with a railing? 2  -MS     To walk in hospital room? 2  -MS     AM-PAC 6 Clicks Score (PT) 12  -MS     Highest level of mobility 4 --> Transferred to chair/commode  -MS     Row Name 07/06/22 1241          Modified [place] Scale    Modified [place] Scale 4 - Moderately severe disability.  Unable to walk without assistance, and unable to attend to own bodily needs without assistance.  -     Row Name 07/06/22 1241 07/06/22 1050       Functional Assessment    Outcome Measure Options AM-PAC 6 Clicks Daily Activity (OT);Modified [place]  - AM-PAC 6 Clicks Basic Mobility (PT)  -MS          User Key  (r) = Recorded By, (t) = Taken By, (c) = Cosigned By    Initials Name Provider Type    Anel Jackson, OTR Occupational Therapist    Jorge Raygoza, PT Physical Therapist                Occupational Therapy Education                 Title: PT OT SLP Therapies (In Progress)     Topic: Occupational Therapy (Done)     Point: ADL training (Done)     Description:   Instruct learner(s) on proper safety adaptation and remediation techniques during self care or transfers.   Instruct in proper use of assistive devices.              Learning Progress Summary           Patient Acceptance, E,TB,D, VU,NR by  at 7/6/2022 1242    Comment: pt and family ed on role of OT. benefit of therapy, POC w. OT. ed on safety w. ADl and tsf. pt demo UE movement but refused ADL and tsf. as he was up earlier   Family Acceptance, E,TB,D, VU,NR by  at 7/6/2022 1242    Comment: pt and family ed on role of OT. benefit of therapy, POC w. OT. ed on safety w. ADl and tsf. pt demo UE movement but refused ADL and tsf. as he was up earlier                   Point: Home exercise program (Done)     Description:   Instruct learner(s) on appropriate technique for monitoring, assisting and/or progressing  therapeutic exercises/activities.              Learning Progress Summary           Patient Acceptance, E,TB,D, VU,NR by  at 7/6/2022 1242    Comment: pt and family ed on role of OT. benefit of therapy, POC w. OT. ed on safety w. ADl and tsf. pt demo UE movement but refused ADL and tsf. as he was up earlier   Family Acceptance, E,TB,D, VU,NR by  at 7/6/2022 1242    Comment: pt and family ed on role of OT. benefit of therapy, POC w. OT. ed on safety w. ADl and tsf. pt demo UE movement but refused ADL and tsf. as he was up earlier                   Point: Precautions (Done)     Description:   Instruct learner(s) on prescribed precautions during self-care and functional transfers.              Learning Progress Summary           Patient Acceptance, E,TB,D, VU,NR by  at 7/6/2022 1242    Comment: pt and family ed on role of OT. benefit of therapy, POC w. OT. ed on safety w. ADl and tsf. pt demo UE movement but refused ADL and tsf. as he was up earlier   Family Acceptance, E,TB,D, VU,NR by  at 7/6/2022 1242    Comment: pt and family ed on role of OT. benefit of therapy, POC w. OT. ed on safety w. ADl and tsf. pt demo UE movement but refused ADL and tsf. as he was up earlier                   Point: Body mechanics (Done)     Description:   Instruct learner(s) on proper positioning and spine alignment during self-care, functional mobility activities and/or exercises.              Learning Progress Summary           Patient Acceptance, E,TB,D, VU,NR by  at 7/6/2022 1242    Comment: pt and family ed on role of OT. benefit of therapy, POC w. OT. ed on safety w. ADl and tsf. pt demo UE movement but refused ADL and tsf. as he was up earlier   Family Acceptance, E,TB,D, VU,NR by  at 7/6/2022 1242    Comment: pt and family ed on role of OT. benefit of therapy, POC w. OT. ed on safety w. ADl and tsf. pt demo UE movement but refused ADL and tsf. as he was up earlier                               User Key     Initials  Effective Dates Name Provider Type Discipline     06/16/21 -  Anel Goldstein OTSANKET Occupational Therapist OT              OT Recommendation and Plan  Planned Therapy Interventions (OT): activity tolerance training, functional balance retraining, occupation/activity based interventions, BADL retraining, patient/caregiver education/training, neuromuscular control/coordination retraining, transfer/mobility retraining, strengthening exercise, ROM/therapeutic exercise  Therapy Frequency (OT): 5 times/wk  Plan of Care Review  Plan of Care Reviewed With: patient  Progress: no change  Outcome Evaluation: pt evaluated for OT. pt admitted s/p fall last Saturday and dx w SDH. pt family member present and states pt has caregivers in the home but he attempted to get up on his own out of the bed and fell. Pt was mostly independent PTA but had some help w ADLs when needed. pt this date was having difficulty w directions and was up earlier w PT. pt required multiple cues to move UEs. pt has decr strength and endurance. pt also declines to do any ADLs this am. pt cont to benefit from OT to incr ADl, strength, balance, and tsf. rec SNU at [place].     Time Calculation:    Time Calculation- OT     Row Name 07/06/22 1243             Time Calculation- OT    OT Start Time 0933  -      OT Stop Time 0940  -      OT Time Calculation (min) 7 min  -      Total Timed Code Minutes- OT 7 minute(s)  -      OT Received On 07/06/22  -      OT - Next Appointment 07/07/22  -      OT Goal Re-Cert Due Date 07/20/22  -              Untimed Charges    OT Eval/Re-eval Minutes 7  -KP              Total Minutes    Untimed Charges Total Minutes 7  -KP       Total Minutes 7  -KP            User Key  (r) = Recorded By, (t) = Taken By, (c) = Cosigned By    Initials Name Provider Type     Anel Goldstein OTR Occupational Therapist              Therapy Charges for Today     Code Description Service Date Service Provider Modifiers Qty     [id number]  OT EVAL MOD COMPLEXITY 2 7/6/2022 Anel Goldstein, OTR GO 1               Anel Goldstein, OTR  7/6/2022

## 2022-07-06 NOTE — THERAPY EVALUATION
Acute Care - Speech Language Pathology   Swallow Initial Evaluation [facility]     Patient Name: Shaun Brewster  : 1930  MRN: [id number]  Today's Date: 2022               Admit Date: 2022    Visit Dx:     ICD-10-CM ICD-9-CM   1. Altered mental status, unspecified altered mental status type  R41.82 780.97   2. Bilateral subdural hematomas ([facility])  S06.5X9A 432.1   3. ESRD (end stage renal disease) on dialysis ([facility])  N18.6 585.6    Z99.2 V45.11     Patient Active Problem List   Diagnosis   • Ventricular tachycardia ([facility])   • Chronic atrial fibrillation ([facility])   • Hypertension   • ESRD (end stage renal disease) ([facility])   • Closed fracture of multiple ribs of right side   • Pleural effusion on right   • Kidney cysts   • Liver cyst   • Acute biliary pancreatitis without infection or necrosis   • Late onset Alzheimer's disease without behavioral disturbance ([facility])   • At risk for falls   • Pneumonia of both lungs due to infectious organism   • Acute respiratory failure with hypoxia ([facility])   • Rhinovirus infection   • DNR (do not resuscitate)   • Chronic systolic CHF (congestive heart failure) ([facility])   • NSTEMI (non-ST elevated myocardial infarction) ([facility])   • Altered mental status   • Intracranial bleed ([facility])   • Hemodialysis patient ([facility])   • Anemia of chronic disease   • Closed blow-out fracture of left orbital floor ([facility])   • History of hip fracture, May 2022   • Recurrent falls   • Macular degeneration   • Malignant neoplasm of prostate ([facility])     Past Medical History:   Diagnosis Date   • Anemia    • 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[facility])    • Arthritis    • Atypical chest pain    • Blind right eye    • Chronic kidney disease (CKD), stage V ([facility])    • Difficulty walking    • Dizziness    • Dyslipidemia    • Esophageal reflux    • Esophagitis, reflux    • Femur fracture, right ([facility])    • HL (hearing loss)    • Hypertension    • Macular degeneration    • Malignant neoplasm of prostate (HCC)     gland   • Memory loss     • Nephrolithiasis    • Nonspecific abnormal finding    • Paroxysmal atrial fibrillation (HCC)    • Postnasal drip    • Premature ventricular contractions    • Renal disease    • Throat pain    • Urge incontinence of urine    • Ventricular tachycardia (HCC)      Past Surgical History:   Procedure Laterality Date   • ARTERIOVENOUS FISTULA Left 2015   • HAND SURGERY Bilateral    • HIP SURGERY     • INGUINAL HERNIA REPAIR     • KNEE SURGERY     • OTHER SURGICAL HISTORY      cardiac cath procedure summary normal, corneal lasik   • REPLACEMENT TOTAL KNEE      left    • SHOULDER SURGERY     • SKIN CANCER EXCISION     • TONSILLECTOMY AND ADENOIDECTOMY         SLP Recommendation and Plan  SLP Swallowing Diagnosis: mod-severe, oral dysphagia (07/06/22 1300)  SLP Diet Recommendation: nectar thick liquids, ice chips between meals after oral care, with supervision, heidy (07/06/22 1300)  Recommended Precautions and Strategies: upright posture during/after eating, small bites of food and sips of liquid, 1:1 supervision (07/06/22 1300)  SLP Rec. for Method of Medication Administration: meds crushed, with pudding or applesauce, as tolerated (07/06/22 1300)     Monitor for Signs of Aspiration: yes, notify SLP if any concerns (07/06/22 1300)     Swallow Criteria for Skilled Therapeutic Interventions Met: demonstrates skilled criteria (07/06/22 1300)  Anticipated Discharge Disposition (SLP): unknown (07/06/22 1300)  Rehab Potential/Prognosis, Swallowing: adequate, monitor progress closely (07/06/22 1300)  Therapy Frequency (Swallow): PRN (07/06/22 1300)  Predicted Duration Therapy Intervention (Days): until discharge (07/06/22 1300)                                  Plan of Care Reviewed With: patient, family  Outcome Evaluation: Clinical swallow evaluation completed, recommend heidy with NTL with 1:1 supervision. PO only when patient is alert and awake. Small bites and sips, check oral cavity for pocketing/cue to swallow as needed.  Meds crushed with puree.      SWALLOW EVALUATION (last 72 hours)     SLP Adult Swallow Evaluation     Row Name 07/06/22 1300                   Rehab Evaluation    Document Type evaluation  -KA        Subjective Information no complaints  -KA        Patient Observations alert;cooperative  -KA        Patient Effort good  -KA        Symptoms Noted During/After Treatment none  -KA                  General Information    Patient Profile Reviewed yes  -KA        Pertinent History Of Current Problem s/p fall, Subdural hygromas. increase in confusion, AMS. HX of dementia, end stage renal disease. Family reports prior to fall and hospitalization patient was on regular diet with thin liquids at SNF, but has been on thickened liquids before. After the fall debora reported patient pocketed eggs  -KA        Current Method of Nutrition NPO  -KA        Precautions/Limitations, Vision vision impairment, bilaterally  -KA        Precautions/Limitations, Hearing hearing impairment, bilaterally  -KA        Prior Level of Function-Communication cognitive-linguistic impairment;expressive language impairment;receptive language impairment;other (see comments)  hx of dementia  -KA        Prior Level of Function-Swallowing regular textures;thin liquids  -KA        Plans/Goals Discussed with patient and family;agreed upon  -KA        Barriers to Rehab medically complex;cognitive status  -KA        Patient's Goals for Discharge return to PO diet  -KA        Family Goals for Discharge patient able to return to PO diet  -KA                  Pain    Additional Documentation Pain Scale: FACES Pre/Post-Treatment (Group)  -KA                  Pain Scale: FACES Pre/Post-Treatment    Pain: FACES Scale, Pretreatment 0-->no hurt  -KA        Posttreatment Pain Rating 0-->no hurt  -KA                  Oral Motor Structure and Function    Dentition Assessment missing teeth;teeth are in poor condition  -KA        Secretion Management WNL/WFL  -KA         Mucosal Quality moist, healthy  -KA                  Oral Musculature and Cranial Nerve Assessment    Oral Motor General Assessment generalized oral motor weakness;other (see comments)  able to follow some commands  -                  General Eating/Swallowing Observations    Respiratory Support Currently in Use room air  -KA        Eating/Swallowing Skills fed by SLP  -        Positioning During Eating upright in bed  -KA        Utensils Used spoon;cup  -KA        Consistencies Trialed thin liquids;nectar/syrup-thick liquids;pureed;ice chips  -        Pre SpO2 (%) 98  -KA        Post SpO2 (%) 98  -KA                  Clinical Swallow Eval    Clinical Swallow Evaluation Summary Patient demonstrated no overt s/s of pen/asp with ice chips. With thins via cup he demonstrated overt coughing on 2 out of 2 trials with slight decrease in 02. With 4oz of NTL via cup patient demonstated x1 cough on first sip and no overt s/s of pen/asp on all other sips. With puree no overt s/s of pen/asp, pt orally held bolus for 20 to 30 seconds prior to swallow initiation with intermittent cues to swallow. Laryngeal elevation felt adequate and age appropriate during neck palpation. Discussed with family and RN recommend puree and NTL with 1:1 supervision with small bites and sips, check oral cavity to make sure patient swallows with no oral residue/pocketing, PO only when patient is alert and awake.  -                  SLP Evaluation Clinical Impression    SLP Swallowing Diagnosis mod-severe;oral dysphagia  -        Functional Impact risk of aspiration/pneumonia;risk of malnutrition;risk of dehydration  -        Rehab Potential/Prognosis, Swallowing adequate, monitor progress closely  -        Swallow Criteria for Skilled Therapeutic Interventions Met demonstrates skilled criteria  -                  SLP Treatment Clinical Impressions    Care Plan Review evaluation/treatment results reviewed  -        Care Plan Review,  Other Participant(s) family;son;daughter  -ARGENIS                  Recommendations    Therapy Frequency (Swallow) PRN  -KA        Predicted Duration Therapy Intervention (Days) until discharge  -        SLP Diet Recommendation nectar thick liquids;ice chips between meals after oral care, with supervision;puree  -ARGENIS        Recommended Precautions and Strategies upright posture during/after eating;small bites of food and sips of liquid;1:1 supervision  -        Oral Care Recommendations Oral Care BID/PRN  -KA        SLP Rec. for Method of Medication Administration meds crushed;with pudding or applesauce;as tolerated  -ARGENIS        Monitor for Signs of Aspiration yes;notify SLP if any concerns  -ARGENIS        Anticipated Discharge Disposition (SLP) unknown  -ARGENIS                  Swallow Goals (SLP)    Swallow STGs diet tolerance goal selection (SLP)  -        Diet Tolerance Goal Selection (SLP) Swallow Short Term Goal 1  -KA                  (STG) Swallow 1    (STG) Swallow 1 Patient will tolerate least restrictive diet without overt s/s of pen/asp  -KA        [place] (Swallow Short Term Goal 1) with minimal cues (75-90% accuracy)  -ARGENIS              User Key  (r) = Recorded By, (t) = Taken By, (c) = Cosigned By    Initials Name Effective Dates    Neno Nolan MA,CCC-SLP 06/02/22 -                 EDUCATION  The patient has been educated in the following areas:   Dysphagia (Swallowing Impairment).        SLP GOALS     Row Name 07/06/22 1300             (STG) Swallow 1    (STG) Swallow 1 Patient will tolerate least restrictive diet without overt s/s of pen/asp  -KA      [place] (Swallow Short Term Goal 1) with minimal cues (75-90% accuracy)  -ARGENIS            User Key  (r) = Recorded By, (t) = Taken By, (c) = Cosigned By    Initials Name Provider Type    Neno Nolan MA,CCC-SLP Speech and Language Pathologist              SLP Outcome Measures (last 72 hours)     SLP Outcome Measures     Row Name 07/06/22 1400              SLP Outcome Measures    Outcome Measure Used? Adult NOMS  -KA              Adult FCM Scores    FCM Chosen Swallowing  -KA      Swallowing FCM Score 4  -KA            User Key  (r) = Recorded By, (t) = Taken By, (c) = Cosigned By    Initials Name Effective Dates    Neno Nolan MA,CCC-SLP 06/02/22 -                  Time Calculation:    Time Calculation- SLP     Row Name 07/06/22 1404             Time Calculation- SLP    SLP Start Time 1315  -KA      SLP Received On 07/06/22  -KA              Untimed Charges    SLP Eval/Re-eval  ST Eval Oral Pharyng Swallow - 02394  -KA      07043-UP Eval Oral Pharyng Swallow Minutes 45  -KA              Total Minutes    Untimed Charges Total Minutes 45  -KA       Total Minutes 45  -KA            User Key  (r) = Recorded By, (t) = Taken By, (c) = Cosigned By    Initials Name Provider Type    Neno Nolan MA,CCC-SLP Speech and Language Pathologist                Therapy Charges for Today     Code Description Service Date Service Provider Modifiers Qty    [id number] HC ST EVAL ORAL PHARYNG SWALLOW 3 7/6/2022 Neno Whyte MA,CCC-SLP GN 1               Neno Wyhte MA,TOMÁS-SLP  7/6/2022

## 2022-07-06 NOTE — PLAN OF CARE
Goal Outcome Evaluation:  Plan of Care Reviewed With: patient, family           Outcome Evaluation: Clinical swallow evaluation completed, recommend puree with NTL with 1:1 supervision. PO only when patient is alert and awake. Small bites and sips, check oral cavity for pocketing/cue to swallow as needed. Meds crushed with puree.

## 2022-07-06 NOTE — PLAN OF CARE
Goal Outcome Evaluation:  Plan of Care Reviewed With: patient           Outcome Evaluation: Pt. is a 89 year old Male admitted to the hospital with an Acute SDH and recent h/o fall.  Pt. also with h/o dementia.  Per family, pt. was able to ambulate using a Rwx just prior to admission.  Pt. currently presents with decreased strength, decreased balance, and decreased tolerance to functional activity.  This AM, pt. able to ambulate 20 feet, Min. assist x 2, with HHA x 2.  Pt. requires Min. assist x 2 for bed mobility and Min. assist x 2 for sit <-> stand transfers.  Pt. will benefit from skilled inpt. P.T. to address his functional deficits and to assist pt. in regaining his maximum level of independence with functional mobility.    Patient was not wearing a face mask during this therapy encounter. Therapist used appropriate personal protective equipment including eye protection, mask, and gloves.  Mask used was standard procedure mask. Appropriate PPE was worn during the entire therapy session. Hand hygiene was completed before and after therapy session. Patient is not in enhanced droplet precautions.

## 2022-07-06 NOTE — PLAN OF CARE
Goal Outcome Evaluation:  Plan of Care Reviewed With: daughter        Progress: no change  Outcome Evaluation: Pt transferred to  for palliative care. PPS 20%. Pt arrived in paras vest. Pt appears calm and comfortable upon arrival but after some time becoming restless. Pt given SL ativan prn x1. Discussed with dtr at bedside goals of care and symptom management. Pt has full time care giver at bedside.

## 2022-07-06 NOTE — PROGRESS NOTES
Name: Shaun Brewster ADMIT: 2022   : 1930  PCP: Andrew Trujillo MD    MRN: [id number] LOS: 2 days   AGE/SEX: 89 y.o. male  ROOM: [facility]     Subjective   Subjective   Pt more alert today. Still won't speak to me or follow commands, but will regard me, follow me around room.    Review of Systems   Unable to perform ROS: Dementia        Objective   Objective   Vital Signs  Temp:  [97.3 °F (36.3 °C)-98.2 °F (36.8 °C)] 97.7 °F (36.5 °C)  Heart Rate:  [100-127] 107  Resp:  [18-24] 18  BP: ()/(53-98) 98/85  SpO2:  [95 %-100 %] 98 %  on  Flow (L/min):  [2] 2;   Device (Oxygen Therapy): room air  Body mass index is 19.9 kg/m².  Physical Exam  Vitals and nursing note reviewed. Exam conducted with a chaperone present (Family x 2).   Constitutional:       General: He is not in acute distress.     Appearance: He is ill-appearing. He is not toxic-appearing or diaphoretic.   HENT:      Head: Normocephalic.      Comments: Ecchymosis around left eye     Nose: Nose normal.      Mouth/Throat:      Mouth: Mucous membranes are dry.      Pharynx: Oropharynx is clear.   Eyes:      General: No scleral icterus.        Right eye: No discharge.         Left eye: No discharge.      Extraocular Movements: Extraocular movements intact.      Conjunctiva/sclera: Conjunctivae normal.   Cardiovascular:      Rate and Rhythm: Tachycardia present. Rhythm irregular.      Pulses: Normal pulses.      Heart sounds: Murmur heard.      Comments: AVF LUE  Pulmonary:      Effort: Pulmonary effort is normal. No respiratory distress.      Breath sounds: No stridor. Rales present. No wheezing or rhonchi.   Abdominal:      General: Bowel sounds are normal. There is no distension.      Palpations: Abdomen is soft.   Musculoskeletal:         General: Swelling (1+ in BLEs) present. No deformity. Normal range of motion.      Cervical back: Neck supple. No rigidity.   Lymphadenopathy:      Cervical: No cervical adenopathy.   Skin:      General: Skin is warm and dry.      Capillary Refill: Capillary refill takes less than 2 seconds.      Coloration: Skin is pale. Skin is not jaundiced.      Findings: No rash.   Neurological:      Comments: Unable to assess fully  Responds to voice but is non-verbal and will not follow commands  Moves all 4 equally   Psychiatric:      Comments: Unable to assess         Results Review     I reviewed the patient's new clinical results.  Results from last 7 days   Lab Units 07/06/22 0925 07/05/22 0804 07/04/22  1158   WBC 10*3/mm3 9.06 10.00 14.40*   HEMOGLOBIN g/dL 9.0* 9.1* 9.2*   PLATELETS 10*3/mm3 166 166 180     Results from last 7 days   Lab Units 07/06/22 0925 07/05/22  0804 07/04/22  1158   SODIUM mmol/L 137 141 141   POTASSIUM mmol/L 4.1 5.6* 5.3*   CHLORIDE mmol/L 96* 99 100   CO2 mmol/L 23.9 24.0 25.1   BUN mg/dL 36* 54* 43*   CREATININE mg/dL 4.44* 7.28* 6.37*   GLUCOSE mg/dL 92 112* 124*   EGFR mL/min/1.73 11.8* 6.5* 7.7*     Results from last 7 days   Lab Units 07/06/22  0925 07/05/22  0804 07/04/22  1158   ALBUMIN g/dL 3.40* 3.70 3.80   BILIRUBIN mg/dL  --  0.4 0.5   ALK PHOS U/L  --  99 114   AST (SGOT) U/L  --  12 17   ALT (SGPT) U/L  --  9 12     Results from last 7 days   Lab Units 07/06/22 0925 07/05/22  0804 07/04/22  1158   CALCIUM mg/dL 9.6 9.7* 9.9*   ALBUMIN g/dL 3.40* 3.70 3.80   MAGNESIUM mg/dL 2.4*  --   --    PHOSPHORUS mg/dL 4.7* 5.7*  --        Glucose   Date/Time Value Ref Range Status   07/05/2022 0540 134 (H) 70 - 130 mg/dL Final     Comment:     Meter: [id number] : [id number] Natasha Roantoinette WATTS       CT Head Without Contrast    Result Date: 7/5/2022  Prominent hygromas overlying the cerebral hemispheres bilaterally measuring approximately 9 mm and 11 mm in maximum thickness on the left and right respectively. These hygromas are predominantly low in attenuation but again appreciated are scattered small foci of more acute blood products, more prominent on the right, unchanged  versus 07/04/2022 at 1218 hours. See above. Continued surveillance is recommended.    Radiation dose reduction techniques were utilized, including automated exposure control and exposure modulation based on body size.       Scheduled Medications  cadexomer iodine, 1 application, Topical, Daily  carvedilol, 6.25 mg, Oral, BID With Meals  donepezil, 10 mg, Oral, Nightly  famotidine, 40 mg, Oral, Daily  finasteride, 5 mg, Oral, Daily  multivitamin, 1 tablet, Oral, Daily  polyethylene glycol, 17 g, Oral, Q3 Days  QUEtiapine, 25 mg, Oral, Nightly  sevelamer, 800 mg, Oral, TID  sodium chloride, 10 mL, Intravenous, Q12H  vitamin B-12, 1,000 mcg, Oral, Daily    Infusions   Diet  NPO Diet NPO Type: Strict NPO       Assessment/Plan     Active Hospital Problems    Diagnosis  POA   • **Intracranial bleed (HCC) [I62.9]  Yes   • Anemia of chronic disease [D63.8]  Yes   • Closed blow-out fracture of left orbital floor (HCC) [S02.32XA]  Yes   • History of hip fracture, May 2022 [Z87.81]  Not Applicable   • Recurrent falls [R29.6]  Not Applicable   • Macular degeneration [H35.30]  Yes   • Malignant neoplasm of prostate (HCC) [C61]  Yes   • Altered mental status [R41.82]  Yes   • Hemodialysis patient ([facility]) [Z99.2]  Not Applicable   • Chronic systolic CHF (congestive heart failure) ([facility]) [I50.22]  Yes   • DNR (do not resuscitate) [Z66]  Yes   • Late onset Alzheimer's disease without behavioral disturbance ([facility]) [G30.1, F02.80]  Yes   • ESRD (end stage renal disease) ([facility]) [N18.6]  Yes   • Hypertension [I10]  Yes   • Chronic atrial fibrillation ([facility]) [I48.20]  Yes      Resolved Hospital Problems   No resolved problems to display.       89yo gentleman with h/o recurrent falls at home (here for fall with right hip fracture in May, in ER 7/2 for fall with left orbital fracture), who was brought to ER with report of increasing lethargy, confusion, and garbled speech since fall 7/2. Imaging 7/2 revealed chronic hygromas, but imaging 7/4  suggested new areas of hemorrhage.    Recurrent falls, ICH: KAMLESH has seen, f/u CT head unchanged, no further AC ever, nothing further to offer per KAMLESH    Alzheimer's dementia with acute confusion/behavioral disturbance: Neuro has seen, MRI brain ordered, await their recs, continue Aricept and Seroquel when taking po, requiring restraints at times for pt and staff safety    Recent right hip fracture 5/2022: conservative mgmt per Ortho, PT eval    Left orbital fracture: noted after fall 7/2, stable    Right heel wound: Wound RN has left [facility] recommendations    PAF, chronic AC (Xarelto): no further AC in this gentleman--it is simply to risky, HRs labile, Coreg on hold as pt is NPO due to decreased LOC, IV metoprolol not effective--HRs seem to improve when pt more calm, will ask pt's cardiologist to see here    ESRD, HyperK+: Renal is dialyzing, K+ wnl now    Anemia of CKD: Hgb stable at baseline, continue to monitor    HTN: BPs labile, continue to monitor, meds on hold as pt is NPO    H/o Prostate CA: stable      · SCDs for DVT prophylaxis.  · Limited code (no CPR, no intubation).  · Discussed with patient. No family present.  · Anticipate discharge to palliative care vs SNF vs home with HH or hospice, timing yet to be determined. Palliative Care consulted to discuss goals of care with family.      Shaun Posadas MD  [facility]  07/06/22  12:44 EDT

## 2022-07-06 NOTE — PROGRESS NOTES
[facility] Progress Note      Patient Name: Shaun Brewster  : 1930  MRN: [id number]  Primary Care Physician:  Andrew Trujillo MD  Date of admission: 2022    Subjective     Interval History:       Review of Systems:   As noted above    Objective     Vitals:   Temp:  [97.3 °F (36.3 °C)-98.2 °F (36.8 °C)] 97.7 °F (36.5 °C)  Heart Rate:  [100-127] 107  Resp:  [18-24] 18  BP: ()/(53-98) 98/85  Flow (L/min):  [2] 2  No intake or output data in the 24 hours ending 22 1317    Physical Exam:    General Appearance: Agitated in vest restraint.  Awake, makes eye contact. Very [place].  Does not try to speak.  not oriented.   Skin: warm and dry  HEENT: oral mucosa dry. nonicteric sclera  Neck: supple, no JVD  Lungs: Clear to auscultation  .  Heart: irreg, irreg.  Abdomen: soft, nontender, nondistended. + bs  Extremities: trace lower ext edema . LUE AVF patent.      Scheduled Meds:     cadexomer iodine, 1 application, Topical, Daily  carvedilol, 6.25 mg, Oral, BID With Meals  donepezil, 10 mg, Oral, Nightly  famotidine, 40 mg, Oral, Daily  finasteride, 5 mg, Oral, Daily  multivitamin, 1 tablet, Oral, Daily  polyethylene glycol, 17 g, Oral, Q3 Days  QUEtiapine, 25 mg, Oral, Nightly  sevelamer, 800 mg, Oral, TID  sodium chloride, 10 mL, Intravenous, Q12H  vitamin B-12, 1,000 mcg, Oral, Daily      IV Meds:        Results Reviewed:   I have personally reviewed the results from the time of this admission to 2022 13:17 EDT     Results from last 7 days   Lab Units 22  0925 22  0804 22  1158   SODIUM mmol/L 137 141 141   POTASSIUM mmol/L 4.1 5.6* 5.3*   CHLORIDE mmol/L 96* 99 100   CO2 mmol/L 23.9 24.0 25.1   BUN mg/dL 36* 54* 43*   CREATININE mg/dL 4.44* 7.28* 6.37*   CALCIUM mg/dL 9.6 9.7* 9.9*   BILIRUBIN mg/dL  --  0.4 0.5   ALK PHOS U/L  --  99 114   ALT (SGPT) U/L  --  9 12   AST (SGOT) U/L  --  12 17   GLUCOSE mg/dL 92 112* 124*       Estimated  Creatinine Clearance: 10.6 mL/min (A) (by C-G formula based on SCr of 4.44 mg/dL (H)).    Results from last 7 days   Lab Units 07/06/22  0925 07/05/22  0804   MAGNESIUM mg/dL 2.4*  --    PHOSPHORUS mg/dL 4.7* 5.7*             Results from last 7 days   Lab Units 07/06/22  0925 07/05/22  0804 07/04/22  1158   WBC 10*3/mm3 9.06 10.00 14.40*   HEMOGLOBIN g/dL 9.0* 9.1* 9.2*   PLATELETS 10*3/mm3 166 166 180       Results from last 7 days   Lab Units 07/04/22  1158   INR  1.39*       Assessment / Plan     ASSESSMENT:  1. ESRD on dialysis MWF  2. Bilateral hygroma, unchanged per neurosurgery.  3. ALtered mental status superimposed on Dementia.  Neurology following .  4. Anemia CKD  5. Persistent afib. No anticoagulation due to risk.   6. SP fall with left orbital fracture .  PLAN:  1. Dialysis tomorrow unless goals of care change .    2. He is palliative care appropriate.   3. DW children at bedside .    Elizabeth Coronado MD  07/06/22  13:17 EDT    [facility]  [phone]

## 2022-07-06 NOTE — PLAN OF CARE
Goal Outcome Evaluation:  Plan of Care Reviewed With: patient           Outcome Evaluation: HR fluctuating in the 110s to 140s. one dose of IV lopressor not effective. Would help determine if pt was calm. Pt is very agitated and restless. is placed in vest restraints for safety and removing lines.    Problem: Adult Inpatient Plan of Care  Goal: Plan of Care Review  Outcome: Ongoing, Progressing  Flowsheets  Taken 7/6/2022 0512 by Eduard Llanes RN  Plan of Care Reviewed With: patient  Outcome Evaluation: HR fluctuating in the 110s to 140s. one dose of IV lopressor not effective. Would help determine if pt was calm. Pt is very agitated and restless. is placed in vest restraints for safety and removing lines.  Taken 7/5/2022 1601 by Kianna Thompson RN  Progress: no change  Goal: Patient-Specific Goal (Individualized)  Outcome: Ongoing, Progressing  Goal: Absence of Hospital-Acquired Illness or Injury  Outcome: Ongoing, Progressing  Intervention: Identify and Manage Fall Risk  Recent Flowsheet Documentation  Taken 7/6/2022 0400 by Eduard Llanes RN  Safety Promotion/Fall Prevention:   activity supervised   clutter free environment maintained   safety round/check completed  Taken 7/6/2022 0200 by Eduard Llanes RN  Safety Promotion/Fall Prevention:   activity supervised   clutter free environment maintained   safety round/check completed  Taken 7/6/2022 0005 by Eduard Llanes RN  Safety Promotion/Fall Prevention:   activity supervised   clutter free environment maintained   safety round/check completed  Taken 7/5/2022 2200 by Eduard Llanes RN  Safety Promotion/Fall Prevention:   activity supervised   clutter free environment maintained   safety round/check completed  Taken 7/5/2022 1940 by Eduard Llanes RN  Safety Promotion/Fall Prevention:   safety round/check completed   clutter free environment maintained   activity supervised  Intervention: Prevent Skin Injury  Recent Flowsheet Documentation  Taken 7/6/2022  0400 by Eduard Llanes RN  Body Position: supine  Taken 7/6/2022 0200 by Eduard Llanes RN  Body Position:   supine   turned  Taken 7/6/2022 0005 by Eduard Llanes RN  Body Position: supine  Taken 7/5/2022 2200 by Eduard Llanes RN  Body Position: supine  Taken 7/5/2022 1940 by Eduard Llanes RN  Body Position: supine  Intervention: Prevent and Manage VTE (Venous Thromboembolism) Risk  Recent Flowsheet Documentation  Taken 7/6/2022 0005 by Eduard Llanes RN  Activity Management: activity adjusted per tolerance  Taken 7/5/2022 1940 by Eduard Llanes RN  Activity Management: activity adjusted per tolerance  Goal: Optimal Comfort and Wellbeing  Outcome: Ongoing, Progressing  Intervention: Provide Person-Centered Care  Recent Flowsheet Documentation  Taken 7/6/2022 0005 by Eduard Llanes RN  Trust Relationship/Rapport: care explained  Taken 7/5/2022 1940 by Eduard Llanes RN  Trust Relationship/Rapport: care explained  Goal: Readiness for Transition of Care  Outcome: Ongoing, Progressing     Problem: Fall Injury Risk  Goal: Absence of Fall and Fall-Related Injury  Outcome: Ongoing, Progressing  Intervention: Promote Injury-Free Environment  Recent Flowsheet Documentation  Taken 7/6/2022 0400 by Eduard Llanes RN  Safety Promotion/Fall Prevention:   activity supervised   clutter free environment maintained   safety round/check completed  Taken 7/6/2022 0200 by Eduard Llanes RN  Safety Promotion/Fall Prevention:   activity supervised   clutter free environment maintained   safety round/check completed  Taken 7/6/2022 0005 by Eduard Llanes RN  Safety Promotion/Fall Prevention:   activity supervised   clutter free environment maintained   safety round/check completed  Taken 7/5/2022 2200 by Eduard Llanes RN  Safety Promotion/Fall Prevention:   activity supervised   clutter free environment maintained   safety round/check completed  Taken 7/5/2022 1940 by Eduard Llanes RN  Safety Promotion/Fall Prevention:   safety  round/check completed   clutter free environment maintained   activity supervised  Goal: Absence of Fall and Fall-Related Injury  Outcome: Ongoing, Progressing  Intervention: Promote Injury-Free Environment  Recent Flowsheet Documentation  Taken 7/6/2022 0400 by Eduard Llanes RN  Safety Promotion/Fall Prevention:   activity supervised   clutter free environment maintained   safety round/check completed  Taken 7/6/2022 0200 by Eduard Llanes RN  Safety Promotion/Fall Prevention:   activity supervised   clutter free environment maintained   safety round/check completed  Taken 7/6/2022 0005 by Eduard Llanes RN  Safety Promotion/Fall Prevention:   activity supervised   clutter free environment maintained   safety round/check completed  Taken 7/5/2022 2200 by Eduard Llanes RN  Safety Promotion/Fall Prevention:   activity supervised   clutter free environment maintained   safety round/check completed  Taken 7/5/2022 1940 by Eduard Llanes RN  Safety Promotion/Fall Prevention:   safety round/check completed   clutter free environment maintained   activity supervised     Problem: Skin Injury Risk Increased  Goal: Skin Health and Integrity  Outcome: Ongoing, Progressing  Intervention: Optimize Skin Protection  Recent Flowsheet Documentation  Taken 7/6/2022 0400 by Eduard Llanes RN  Head of Bed (HOB) Positioning: HOB at 20-30 degrees  Taken 7/6/2022 0200 by Eduard Llanes RN  Head of Bed (HOB) Positioning: HOB at 20-30 degrees  Taken 7/6/2022 0005 by Eduard Llanes RN  Head of Bed (HOB) Positioning: HOB at 20-30 degrees  Taken 7/5/2022 2200 by Eduard Llanes RN  Head of Bed (HOB) Positioning: HOB at 20-30 degrees  Taken 7/5/2022 1940 by Eduard Llanes RN  Head of Bed (HOB) Positioning: HOB at 20-30 degrees     Problem: Behavior Regulation Impairment (Dementia Signs/Symptoms)  Goal: Improved Behavioral Control (Dementia Signs/Symptoms)  Outcome: Ongoing, Progressing     Problem: Restraint, Nonbehavioral (Nonviolent)  Goal:  Absence of Harm or Injury  Outcome: Ongoing, Progressing  Intervention: Implement Least Restrictive Safety Strategies  Recent Flowsheet Documentation  Taken 7/6/2022 0400 by Eduard Llanes RN  Less Restrictive Alternative:   bed alarm in use   calming techniques promoted   environment adjusted  De-Escalation Techniques:   increased round frequency   reoriented   quiet time facilitated   stimulation decreased  Diversional Activities: television  Taken 7/6/2022 0336 by Eduard Llanes RN  Less Restrictive Alternative:   bed alarm in use   calming techniques promoted   environment adjusted  De-Escalation Techniques:   increased round frequency   quiet time facilitated   reoriented  Diversional Activities: television  Intervention: Protect Dignity, Rights, and Personal Wellbeing  Recent Flowsheet Documentation  Taken 7/6/2022 0005 by Eduard Llanes RN  Trust Relationship/Rapport: care explained  Taken 7/5/2022 1940 by Eduard Llanes RN  Trust Relationship/Rapport: care explained  Intervention: Protect Skin and Joint Integrity  Recent Flowsheet Documentation  Taken 7/6/2022 0400 by Eduard Llanes RN  Body Position: supine  Taken 7/6/2022 0200 by Eduard Llanes RN  Body Position:   supine   turned  Taken 7/6/2022 0005 by Eduard Llanes RN  Body Position: supine  Taken 7/5/2022 2200 by Eduard Llanes RN  Body Position: supine  Taken 7/5/2022 1940 by Eduard Llanes RN  Body Position: supine

## 2022-07-06 NOTE — PROGRESS NOTES
"DOS: 2022  NAME: Shaun Brewster   : 1930  PCP: Andrew Trujillo MD  Chief Complaint   Patient presents with   • Speech Problem   • Extremity Weakness     Neurology     Subjective: Patient remains impulsive requiring Posey vest restraint.    FLACC 0          Daughter and son at bedside    Objective:  Vital signs: /87 (BP Location: Right arm, Patient Position: Lying)   Pulse (!) 127   Temp 97.7 °F (36.5 °C) (Infrared)   Resp 20   Ht 188 cm (74\")   Wt 70.3 kg (154 lb 15.7 oz)   SpO2 99%   BMI 19.90 kg/m²       HEENT: Normocephalic, atraumatic   COR: RRR  Resp: Even and unlabored  Extremities: Equal pulses, nondistal embolization    Neurological:   Exam limited.  Minimal verbal response attempts to communicate; some discernible speech.  Shakes my hand when extended.  Tracks around the room.  Does not follow commands appears hard of hearing.  Moves all extremities equally although appears to have generalized weakness.      Laboratory results:  Lab Results   Component Value Date    GLUCOSE 112 (H) 2022    CALCIUM 9.7 (H) 2022     2022    K 5.6 (H) 2022    CO2 24.0 2022    CL 99 2022    BUN 54 (H) 2022    CREATININE 7.28 (H) 2022    EGFRIFAFRI  2018      Comment:      <15 Indicative of kidney failure.    EGFRIFNONA 10 (L) 2018    BCR 7.4 2022    ANIONGAP 18.0 (H) 2022     Lab Results   Component Value Date    WBC 10.00 2022    HGB 9.1 (L) 2022    HCT 27.6 (L) 2022    MCV 95.8 2022     2022         Review and interpretation of imaging:    Impression: This is a 89-year-old right-handed male with history of phonic kidney failure on hemodialysis Monday, Wednesday, Friday and progressive dementia resented to [facility]  due to fall resulting in trauma to the head on .  Since that time patient has been noted to be altered and not communicating verbally therefore " family brought patient to emergency room for further work-up and evaluation.  Initial CT of the head showed evidence of bilateral cerebral subdural hygromas status to recent CT of the head 2 days ago along with small acute subdural hematoma.  Repeat CT of the head 7/5 stable- unchanged.  Neurosurgery following.  Patient unable to tolerate MRI or canceled.  EEG considered although given patient unable to complete MRI will defer at this time-can consider if patient fails to improve.    Neurologically, patient remains altered with minimal verbal response/not at neurologic baseline.  Family open to goals of care discussion therefore palliative team consult order placed per their request.  Other recommendations below.PT/OT/ST. CCP to assist with discharge planning. Call RRT for any acute neurological changes and/or concerns. We will continue to follow and advise.       Diagnosis:  1.  Altered mental status; known cognitive impairment/dementia-on Aricept and Seroquel prior to arrival  2.  Fall resulting in head trauma  3.  History of recent hip replacement  4.  Known late onset Alzheimer's disease  4.  ESRD; on hemodialysis Monday Wednesday Friday    Plan:  Palliative consult  Seizure suspicion low although given presence of SDH could consider use of Depakote for mood and seizure prophylaxis  Zyprexa 2.5 mg twice daily as needed for increased agitation/restlessness  Continue Aricept and Seroquel as written      Reviewed with attending neurologist  and he agrees with treatment plan above.     STEPHANIE Pedraza

## 2022-07-06 NOTE — PLAN OF CARE
Goal Outcome Evaluation:  Plan of Care Reviewed With: patient        Progress: no change  Outcome Evaluation: pt evaluated for OT. pt admitted s/p fall last Saturday and dx w SDH. pt family member present and states pt has caregivers in the home but he attempted to get up on his own out of the bed and fell. Pt was mostly independent PTA but had some help w ADLs when needed. pt this date was having difficulty w directions and was up earlier w PT. pt required multiple cues to move UEs. pt has decr strength and endurance. pt also declines to do any ADLs this am. pt cont to benefit from OT to incr ADl, strength, balance, and tsf. rec SNU at [place].  OT wore all PPE, washed hands before/after.

## 2022-07-07 PROBLEM — D50.9 IRON DEFICIENCY ANEMIA: Status: ACTIVE | Noted: 2022-01-01

## 2022-07-07 PROBLEM — Z99.2 ANEMIA DUE TO CHRONIC KIDNEY DISEASE, ON CHRONIC DIALYSIS (HCC): Status: ACTIVE | Noted: 2022-01-01

## 2022-07-07 PROBLEM — G31.9 CEREBRAL ATROPHY (HCC): Status: ACTIVE | Noted: 2022-01-01

## 2022-07-07 PROBLEM — S02.85XD CLOSED FRACTURE OF ORBIT WITH ROUTINE HEALING: Status: ACTIVE | Noted: 2022-01-01

## 2022-07-07 PROBLEM — S06.5XAA SDH (SUBDURAL HEMATOMA) (HCC): Status: ACTIVE | Noted: 2022-01-01

## 2022-07-07 PROBLEM — Z51.5 PALLIATIVE CARE BY SPECIALIST: Status: ACTIVE | Noted: 2022-01-01

## 2022-07-07 PROBLEM — N18.6 ANEMIA DUE TO CHRONIC KIDNEY DISEASE, ON CHRONIC DIALYSIS: Status: ACTIVE | Noted: 2022-01-01

## 2022-07-07 PROBLEM — I67.9 CEREBROVASCULAR SMALL VESSEL DISEASE: Status: ACTIVE | Noted: 2022-01-01

## 2022-07-07 PROBLEM — I34.0 MODERATE MITRAL VALVE REGURGITATION: Status: ACTIVE | Noted: 2022-01-01

## 2022-07-07 PROBLEM — G96.08 SUBDURAL HYGROMA: Status: ACTIVE | Noted: 2022-01-01

## 2022-07-07 PROBLEM — I50.42 CHRONIC COMBINED SYSTOLIC AND DIASTOLIC CONGESTIVE HEART FAILURE (HCC): Status: ACTIVE | Noted: 2022-01-01

## 2022-07-07 PROBLEM — I35.0 AORTIC VALVE STENOSIS, MILD: Status: ACTIVE | Noted: 2022-01-01

## 2022-07-07 PROBLEM — D63.1 ANEMIA DUE TO CHRONIC KIDNEY DISEASE, ON CHRONIC DIALYSIS (HCC): Status: ACTIVE | Noted: 2022-01-01

## 2022-07-07 NOTE — PLAN OF CARE
Goal Outcome Evaluation:  Plan of Care Reviewed With: patient        Progress: declining  Outcome Evaluation: Patient appeared comfortable overnight, given IV Dilaudid and sublingual Ativan for pain and restlessness with relief of symptoms.  Non-violent restraints able to be removed safely, private sitter at bedside.  Patient confused and only responsive to pain, repositioned every 4 hours for comfort, will continue comfort measures.

## 2022-07-07 NOTE — SIGNIFICANT NOTE
"   07/07/22 0843   OTHER   Discipline occupational therapist   Rehab Time/Intention   Session Not Performed other (see comments)  (Per chart review, pt transfered to pallative care with \"comfort measures only\". OT to sign off at this time, please consult if needed.)   Recommendation   OT - Next Appointment  --      "

## 2022-07-07 NOTE — PLAN OF CARE
Goal Outcome Evaluation:  Plan of Care Reviewed With: patient, family        Progress: declining  Outcome Evaluation: Responsive to pain. Robinul given for increased secretions. Dilaudid (increased to 1mg) and ativan given prior to turns. Recovery position. Family at bedside. Will continue to monitor and provide palliative care.

## 2022-07-07 NOTE — NURSING NOTE
Non-violent restraints discontinued at this time, patient calm and appropriate, will continue to monitor.

## 2022-07-08 NOTE — PROGRESS NOTES
Case Management Discharge Note      Final Note: The patient  on 22 @ 11:17. BFidelina Justin RN, CCP.    Provided Post Acute Provider List?: N/A  Provided Post Acute Provider Quality & Resource List?: N/A    Selected Continued Care - Admitted Since 2022     Destination    No services have been selected for the patient.              Durable Medical Equipment    No services have been selected for the patient.              Dialysis/Infusion    No services have been selected for the patient.              Home Medical Care    No services have been selected for the patient.              Therapy    No services have been selected for the patient.              Community Resources    No services have been selected for the patient.              Community & DME    No services have been selected for the patient.                       Final Discharge Disposition Code: 20 -

## 2022-07-08 NOTE — PLAN OF CARE
Goal Outcome Evaluation:  Plan of Care Reviewed With: patient        Progress: declining  Outcome Evaluation: Patient only responsive to pain, otherwise appears comfortable and asleep.  Patient with increasing airway congestion, IV robinul improving symptoms.  Patient has private caregiver at bedside overnight.  Oral care given, will continue comfort measures.

## 2022-07-08 NOTE — PROGRESS NOTES
Discharge Planning Assessment  [facility]     Patient Name: Shaun Brewster  MRN: [id number]  Today's Date: 2022    Admit Date: 2022     Discharge Needs Assessment    No documentation.                Discharge Plan     Row Name 22 1139       Plan    Plan Comments The patient transferred to [facility] from [address] [place] [address] on 22. The patient was palliative. [facility] was to evaluate. LIV Justin RN CCP    Final Discharge Disposition Code 20 -     Final Note The patient  on 22 @ 11:17. LIV Justin RN, CCP.              Continued Care and Services - Admitted Since 2022     Home Medical Care     Service Provider Request Status Selected Services Address Phone Fax Patient Preferred     [facility]  Accepted N/A [address] [address] [phone] --              Expected Discharge Date and Time     Expected Discharge Date Expected Discharge Time    2022          Demographic Summary    No documentation.                Functional Status    No documentation.                Psychosocial    No documentation.                Abuse/Neglect    No documentation.                Legal    No documentation.                Substance Abuse    No documentation.                Patient Forms    No documentation.                   Alejandra Justin RN

## 2022-07-08 NOTE — H&P
Palliative Care/Hospice Admit/Consult Note       Referring Provider: Shaun Posadas MD   Reason for Consultation: Palliative/Hospice Care  Date of Admission:  7/4/2022    Patient Care Team:  Andrew Trujillo MD as PCP - Family Medicine  Sumeet, Delmar SERRATO MD as Attending Provider (Hospice and Palliative Medicine)    Chief complaint:      History of present illness:  The patient is a 89 y.o. male who presented to the ED with altered mental status.  He had a recent fall and was evaluated in this ED on 7/2/2022.  CT of the brain showed no acute hemorrhages.  He did have a left inferior orbital blowout fracture without entrapment. The patient has had a gradual decline since then.  Family stated the morning of admission he was lethargic, drooling and tremulous.  Repeat CT brain 7/4/2022:  Bilateral cerebral convexity subdural hygromas are similar in size to the head CT 2 days ago and previous head CT 05/22/2022. Hygromas now contain small curvilinear areas of increased density that could in part represent vascularity though appear new since the previous exam and are suspicious for small areas of hemorrhage into subdural hygromas such as best demonstrated posterolateral to the superior right frontal lobe and in the left temporal region.     The family also reported that his speech was slurred and he was very confused.  He lives alone but has caretakers with him 24 hours/day. Last Xarelto dose was approximately 48 hours ago    The patient has severe Alzheimer's dementia. Additionally, he has end-stage renal disease on maintenance hemodialysis via left upper extremity AV fistula and he undergoes dialysis every Monday, Wednesday and Friday. He also has hypertension, atrial fibrillation, anemia and dyslipidemia.    The patient's last HD was 7/5/2022.    Repeat head CT 7/5/2022 showed no significant change in hygromas. Nothing to add from Neurosurgical standpoint.      Subsequently, palliative care team spoke with  "patient's daughter and son at bedside. All voiced understanding of patient condition, decline, and prognosis. Both agreed goal at this time would be comfort and quality, stating \"he would never want to live this way\". Discussed palliative care unit, symptom management, and hospice. Family aware of palliative care unit, as their mother  there years ago. Both agree to transfer to unit with comfort focus.   I was asked to assume the patient's care.    At the time of my evaluation, the patient's son and one of the patient's caregivers were at bedside.  The patient was lying on his right side.  The patient was not awake and no ROS obtainable.  With medications previously administered, the patient appears comfortable.    Review of Systems  Pertinent items are noted in HPI    Palliative Performance Scale  Palliative Performance Scale Score: 10%  Arnot Symptom Assessment System Revised  Pain Score: 2   ESAS Tiredness Score: 8  ESAS Nausea Score: No nausea  ESAS Depression Score: unable to assess  ESAS Anxiety Score: 6  ESAS Drowsiness Score: 8  ESAS Lack of Appetite Score: Worst lack of appetite  ESAS Wellbeing Score: unable to assess  ESAS Dyspnea Score: No shortness of breath  ESAS Other Problem Score: unable to assess  ESAS Source of Information: healthcare professional caregiver  ESAS Intervention: medicated/see MAR  ESAS Intervention Response: tolerated    History  Past Medical History:   Diagnosis Date   • Anemia    • 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HCC)    • Arthritis    • Atypical chest pain    • Blind right eye    • Chronic kidney disease (CKD), stage V (HCC)    • Difficulty walking    • Dizziness    • Dyslipidemia    • Esophageal reflux    • Esophagitis, reflux    • Femur fracture, right (HCC)    • HL (hearing loss)    • Hypertension    • Macular degeneration    • Malignant neoplasm of prostate (HCC)     gland   • Memory loss    • Nephrolithiasis    • Nonspecific abnormal finding    • Paroxysmal atrial " fibrillation (HCC)    • Postnasal drip    • Premature ventricular contractions    • Renal disease    • Throat pain    • Urge incontinence of urine    • Ventricular tachycardia (HCC)    ,   Past Surgical History:   Procedure Laterality Date   • ARTERIOVENOUS FISTULA Left 2015   • HAND SURGERY Bilateral    • HIP SURGERY     • INGUINAL HERNIA REPAIR     • KNEE SURGERY     • OTHER SURGICAL HISTORY      cardiac cath procedure summary normal, corneal lasik   • REPLACEMENT TOTAL KNEE      left    • SHOULDER SURGERY     • SKIN CANCER EXCISION     • TONSILLECTOMY AND ADENOIDECTOMY     ,   Family History   Problem Relation Age of Onset   • Other Brother         acute bacterial endocarditis    and   Social History     Socioeconomic History   • Marital status:    Tobacco Use   • Smoking status: Former Smoker   • Smokeless tobacco: Never Used   • Tobacco comment: denies caffeine use   Vaping Use   • Vaping Use: Never used   Substance and Sexual Activity   • Alcohol use: No   • Drug use: No   • Sexual activity: Defer     E-cigarette/Vaping   • E-cigarette/Vaping Use Never User      E-cigarette/Vaping Substances   • Nicotine No    • THC No    • CBD No    • Flavoring No      E-cigarette/Vaping Devices   • Disposable No    • Pre-filled or Refillable Cartridge No    • Refillable Tank No    • Pre-filled Pod No         Allergy Amiodarone and Nitrofurantoin    Vital Signs   Temp:  [96.9 °F (36.1 °C)-98 °F (36.7 °C)] 96.9 °F (36.1 °C)  Heart Rate:  [] 150  Resp:  [14-16] 14  BP: (89)/(54) 89/54  Device (Oxygen Therapy): room air SpO2:  [82 %-91 %] 82 %    Physical Exam:  General Appearance:   Not awake and appears in no acute distress lying on his right side, chronically ill-appearing elderly male   Head:    Normocephalic, without obvious abnormality   Eyes:            Lids and lashes normal, conjunctivae and sclerae normal, no icterus   Ears:    Ears appear intact with no abnormalities noted   Throat:   No oral lesions,  oral mucosa somewhat moist   Neck:   No adenopathy, supple, trachea midline, no thyromegaly   Back:     No scoliosis present   Lungs:     Clear to auscultation with occasional rhonchi, respirations demonstrate Cheyne-Yoder respirations with occasional pause     Heart:    Regular rhythm and normal rate       Abdomen:   Occasional bowel sounds, soft and non-tender, non-distended   Genitalia:    Deferred   Extremities:  No edema, AV fistula left upper arm, pale and ashen and some cyanosis noted    Pulses:  Radial pulses palpable and equal bilaterally   Skin:   No bleeding         Neurologic:  Not awake to test      Results Review:   I reviewed the patient's new clinical results.    Impression:      Altered mental status    Bilateral subdural hygromas    SDH (subdural hematoma) ([facility])    Palliative care by specialist    Chronic atrial fibrillation ([facility])    Hypertension    ESRD (end stage renal disease) ([facility]); last HD 7/2/2022    Late onset Alzheimer's disease without behavioral disturbance ([facility])    Chronic combined systolic and diastolic congestive heart failure ([facility])    Recurrent falls    Closed fracture of left inferior orbit with routine healing; 7/2/2022.    Aortic valve stenosis, mild    Moderate mitral valve regurgitation    Pleural effusion, bilateral    Anemia of chronic disease    History of hip fracture, May 2022    Cerebrovascular small vessel disease    Cerebral atrophy ([facility])    Iron deficiency anemia    Anemia due to chronic kidney disease, on chronic dialysis ([facility])      Plan:  I reviewed the patient's present admission and recent ED visit and medical records.  I reviewed with the patient's son at bedside.  I reviewed with the RN.  I examined the patient.  With medications previously administered, patient appears comfortable.  Due to the patient's condition, he has been unable to take his p.o. medications.  They will be discontinued.  The patient has required glycopyrrolate for airway congestion.  The  patient has required 4 doses of 0.5 mg Dilaudid and 4 doses of 1 mg Ativan concentrated solution thus far today.  Medications will be continued and adjusted as needed for symptom management for comfort.  No attempts at resuscitation will be made.  I answered all of the patient's son's questions.  He is aware of the patient's terminal condition.      Delmar Fay MD  Hospice and Palliative Medicine  07/07/22  20:30 EDT

## 2022-07-10 NOTE — DISCHARGE SUMMARY
Discharge As      Date of Admisssion:  2022  Date of Death:  2022  Time of Death:  11:17 AM    Patient Care Team:  Andrew Trujillo MD as PCP - Family Medicine  Sumeet, Delmar SERRATO MD as Attending Provider (Hospice and Palliative Medicine)    Final Diagnosis:     SDH (subdural hematoma) (HCC)    Altered mental status    Bilateral subdural hygromas    Palliative care by specialist    Chronic atrial fibrillation (HCC)    Hypertension    ESRD (end stage renal disease) ([facility]); last HD 2022    Late onset Alzheimer's disease without behavioral disturbance (HCC)    Chronic combined systolic and diastolic congestive heart failure (HCC)    Recurrent falls    Closed fracture of left inferior orbit with routine healing; 2022.    Aortic valve stenosis, mild    Moderate mitral valve regurgitation    Pleural effusion, bilateral    Anemia of chronic disease    History of hip fracture, May 2022    Cerebrovascular small vessel disease    Cerebral atrophy (HCC)    Iron deficiency anemia    Anemia due to chronic kidney disease, on chronic dialysis ([facility])      Hospital Course  Patient was a 89 y.o. male who presented to the ED with altered mental status.  He had a recent fall and was evaluated in this ED on 2022.  CT of the brain showed no acute hemorrhages.  He did have a left inferior orbital blowout fracture without entrapment. The patient has had a gradual decline since then.  Family stated the morning of admission he was lethargic, drooling and tremulous.  Repeat CT brain 2022:  Bilateral cerebral convexity subdural hygromas are similar in size to the head CT 2 days ago and previous head CT 2022. Hygromas now contain small curvilinear areas of increased density that could in part represent vascularity though appear new since the previous exam and are suspicious for small areas of hemorrhage into subdural hygromas such as best demonstrated posterolateral to the superior right frontal  "lobe and in the left temporal region.      The family also reported that his speech was slurred and he was very confused.  He lives alone but has caretakers with him 24 hours/day. Last Xarelto dose was approximately 48 hours ago     The patient has severe Alzheimer's dementia. Additionally, he has end-stage renal disease on maintenance hemodialysis via left upper extremity AV fistula and he undergoes dialysis every Monday, Wednesday and Friday. He also has hypertension, atrial fibrillation, anemia and dyslipidemia.     The patient's last HD was 2022.     Repeat head CT 2022 showed no significant change in hygromas. Nothing to add from Neurosurgical standpoint.       Subsequently, palliative care team spoke with patient's daughter and son at bedside. All voiced understanding of patient condition, decline, and prognosis. Both agreed goal at this time would be comfort and quality, stating \"he would never want to live this way\". Discussed palliative care unit, symptom management, and hospice. Family aware of palliative care unit, as their mother  there years ago. Both agree to transfer to unit with comfort focus.   I was asked to assume the patient's care. I was able to discuss with the patient's son at the time of my initial evaluation. Medications were provided and adjusted as needed for symptom management for comfort. Prior to seeing the patient 2022, I was called that the patient's respirations ceased and no pulse palpable. No heart sounds audible. I pronounced the patient at 1117 hours.        Delmar Fay MD  Hospice and Palliative Medicine  07/10/22  16:46 EDT                     "

## 2024-06-03 NOTE — ED NOTES
PT ambulated w portable SPO2/HR pulse oximeter. SPO2 stayed above 96%, HR reached 119bpm during ambulation and once back in room and placed back on monitor HR reach 148 bpm. PT reports no symptoms of dizziness, fatigues, shortness of breath during ambulation. KAMRAN Alva and STEPHANIE Aguirre notified.    [[employer] ] : provided by [employer]   [Time Spent: ____ minutes] : Total time spent using  services: [unfilled] minutes. The patient's primary language is not English thus required  services. [Interpreters_IDNumber] : [id number]